# Patient Record
Sex: FEMALE | Race: WHITE | Employment: OTHER | URBAN - METROPOLITAN AREA
[De-identification: names, ages, dates, MRNs, and addresses within clinical notes are randomized per-mention and may not be internally consistent; named-entity substitution may affect disease eponyms.]

---

## 2017-01-16 ENCOUNTER — DOCUMENTATION ONLY (OUTPATIENT)
Dept: INTERNAL MEDICINE CLINIC | Age: 67
End: 2017-01-16

## 2017-01-17 ENCOUNTER — OFFICE VISIT (OUTPATIENT)
Dept: INTERNAL MEDICINE CLINIC | Age: 67
End: 2017-01-17

## 2017-01-17 VITALS
HEART RATE: 58 BPM | SYSTOLIC BLOOD PRESSURE: 130 MMHG | WEIGHT: 152.7 LBS | BODY MASS INDEX: 26.07 KG/M2 | OXYGEN SATURATION: 98 % | TEMPERATURE: 97.9 F | HEIGHT: 64 IN | DIASTOLIC BLOOD PRESSURE: 60 MMHG | RESPIRATION RATE: 14 BRPM

## 2017-01-17 DIAGNOSIS — Z23 ENCOUNTER FOR IMMUNIZATION: ICD-10-CM

## 2017-01-17 DIAGNOSIS — Z00.00 MEDICARE ANNUAL WELLNESS VISIT, INITIAL: Primary | ICD-10-CM

## 2017-01-17 NOTE — PROGRESS NOTES
Judson Guan is a 74 y.o. female and presents for annual Medicare Wellness Visit. Problem List: Reviewed with patient and discussed risk factors. 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     Hx breast lumpectomy       right - benign    Hx orthopaedic       surgery for tibial plateau fx - took bone from hip/has hardware knee to ankle    Hx gi       hemorrhoidectomy        SH: Reviewed with patient  Social History   Substance Use Topics    Smoking status: Never Smoker    Smokeless tobacco: Never Used    Alcohol use 4.2 oz/week     7 Glasses of wine per week       FH: Reviewed with patient  Family History   Problem Relation Age of Onset    Alcohol abuse Mother      cirrhosis    Alcohol abuse Father     Other Father      cerebral hemorrhage    Cancer Brother      prostate    Heart Disease Brother 46     MI(age 46), bypass(66)    Diabetes Maternal Aunt     Hypertension Maternal Aunt     Cancer Maternal Uncle      COLON    Heart Disease Maternal Grandmother      MI    Cancer Cousin      BREAST       Medications/Allergies: Reviewed with patient  Current Outpatient Prescriptions on File Prior to Visit   Medication Sig Dispense Refill    SYNTHROID 100 mcg tablet TAKE 1 TABLET DAILY BEFORE BREAKFAST 90 Tab 1    simvastatin (ZOCOR) 40 mg tablet Take 1 Tab by mouth nightly. 90 Tab 1    FLUoxetine (PROZAC) 20 mg capsule TAKE 1 CAPSULE DAILY 90 Cap 1    coenzyme q10 300 mg cap Take 300 mg by mouth daily.  ranitidine (ZANTAC) 300 mg tablet Take 1 Tab by mouth daily. 60 Tab 0    LACTOBACILLUS ACIDOPHILUS (PROBIOTIC PO) Take 1 Cap by mouth daily.  BOSWELLIA SURENDRA EXTRACT (BOSWELLIA SURENDRA XT, BULK,) Take  by mouth daily. Anti-inflammatory supplement. Takes one cap po daily.  MULTIVITAMIN PO Take  by mouth daily. Takes one po daily.  CETIRIZINE HCL (ZYRTEC PO) Take 10 mg by mouth as needed. No current facility-administered medications on file prior to visit. Allergies   Allergen Reactions    Adhesive Hives    E-Mycin [Erythromycin] Other (comments)     GI upset    Sudafed [Pseudoephedrine Hcl] Palpitations       Objective:  Visit Vitals    /60 (BP 1 Location: Left arm, BP Patient Position: Sitting)    Pulse (!) 58    Temp 97.9 °F (36.6 °C) (Oral)    Resp 14    Ht 5' 4\" (1.626 m)    Wt 152 lb 11.2 oz (69.3 kg)    SpO2 98%    BMI 26.21 kg/m2    Body mass index is 26.21 kg/(m^2). Assessment of cognitive impairment: Alert and oriented x 3    Depression Screen:   PHQ 2 / 9, over the last two weeks 1/17/2017   Little interest or pleasure in doing things Not at all   Feeling down, depressed or hopeless Not at all   Total Score PHQ 2 0       Fall Risk Assessment:    Fall Risk Assessment, last 12 mths 1/17/2017   Able to walk? Yes   Fall in past 12 months?  No       Functional Ability:   Does the patient exhibit a steady gait? yes   How long did it take the patient to get up and walk from a sitting position? 3 seconds   Is the patient self reliant?  (ie can do own laundry, meals, household chores)  yes     Does the patient handle his/her own medications? yes     Does the patient handle his/her own money? yes     Is the patients home safe (ie good lighting, handrails on stairs and bath, etc.)? yes     Did you notice or did patient express any hearing difficulties? no     Did you notice or did patient express any vision difficulties?   no     Were distance and reading eye charts used? no       Advance Care Planning:   Patient was offered the opportunity to discuss advance care planning:  yes     Does patient have an Advance Directive:  yes   If no, did you provide information on Caring Connections?  no       Plan:      Orders Placed This Encounter    pneumococcal 13 marija conj dip (PREVNAR-13) 0.5 mL syrg injection       Health Maintenance   Topic Date Due    GLAUCOMA SCREENING Q2Y  01/01/2018    MEDICARE YEARLY EXAM  01/18/2018    BREAST CANCER SCRN MAMMOGRAM  06/14/2018    COLONOSCOPY  11/09/2020    DTaP/Tdap/Td series (2 - Td) 10/16/2026    Hepatitis C Screening  Completed    OSTEOPOROSIS SCREENING (DEXA)  Completed    ZOSTER VACCINE AGE 60>  Completed    Pneumococcal 65+ Low/Medium Risk  Completed    INFLUENZA AGE 9 TO ADULT  Completed       *Patient verbalized understanding and agreement with the plan. A copy of the After Visit Summary with personalized health plan was given to the patient today. Patient up to date on immunizations except Prevnar. Prevnar ordered today. Patient has eye exam scheduled for April 2017 with Dr. Ramin Pack. Patient receives mammograms and DEXA through Dr. Fifi Mota office(GYN). Last mammogram was June 2016. Dexa was 2015. Patient had colonoscopy in 12/2016 and is to follow-up in 3 years. Patient complains of lack of sexual intercourse in her marriage.  She said \" is not interested\". She has tried to get him to attend counseling, but he refuses. Suggested patient to express her needs and concerns, and consider counseling alone. Patients does not exercise, but reports she has a well-balanced diet with portion control. Patient will bring advance medical directive to next visit.

## 2017-01-17 NOTE — MR AVS SNAPSHOT
Visit Information Date & Time Provider Department Dept. Phone Encounter #  
 1/17/2017  2:30 PM MANASA Verdugo [facility] [phone] [id number] Your Appointments 3/10/2017 10:40 AM  
ROUTINE CARE with MD Julieta Arenas [facility] ([facility]) Appt Note: 6m fu  
 [address] , [address]  
[address], [address] [address]  
  
    
 6/9/2017  1:20 PM  
ESTABLISHED PATIENT with Senthil Devlin MD  
[facility] ([facility]) Appt Note: one year follow up  
 [address]  
[address] [address] Upcoming Health Maintenance Date Due  
 GLAUCOMA SCREENING Q2Y 1/1/2018 MEDICARE YEARLY EXAM 1/18/2018 BREAST CANCER SCRN MAMMOGRAM 6/14/2018 COLONOSCOPY 11/9/2020 DTaP/Tdap/Td series (2 - Td) 10/16/2026 Allergies as of 1/17/2017  Review Complete On: 1/17/2017 By: Servando Carcamo LPN Severity Noted Reaction Type Reactions Adhesive Medium 03/09/2016    Hives E-mycin [Erythromycin]  12/07/2011   Side Effect Other (comments) GI upset Sudafed [Pseudoephedrine Hcl]  12/07/2011   Side Effect Palpitations Current Immunizations  Reviewed on 9/9/2016 Name Date Influenza High Dose Vaccine PF 10/31/2015 Influenza Vaccine 10/11/2016, 10/1/2014, 9/18/2013 11:12 AM  
 Pneumococcal Polysaccharide (PPSV-23) 3/18/2014 Pneumococcal Vaccine (Unspecified Type) 10/1/2014 Tdap 10/16/2016 Zoster 7/9/2011 Not reviewed this visit You Were Diagnosed With   
  
 Codes Comments Medicare annual wellness visit, initial    -  Primary ICD-10-CM: Z00.00 ICD-9-CM: V70.0 Encounter for immunization     ICD-10-CM: K63 ICD-9-CM: V03.89 Vitals BP Pulse Temp Resp Height(growth percentile) Weight(growth percentile) 130/60 (BP 1 Location: Left arm, BP Patient Position: Sitting) (!) 58 97.9 °F (36.6 °C) (Oral) 14 5' 4\" (1.626 m) 152 lb 11.2 oz (69.3 kg) SpO2 BMI OB Status Smoking Status 98% 26.21 kg/m2 Postmenopausal Never Smoker Vitals History BMI and BSA Data Body Mass Index Body Surface Area  
 26.21 kg/m 2 1.77 m 2 Preferred Pharmacy Pharmacy Name Phone [facility] [address] [place] [address], [address] [place] [address] [phone] Your Updated Medication List  
  
   
This list is accurate as of: 17  3:14 PM.  Always use your most recent med list.  
  
  
  
  
 BOSWELLIA SURENDRA XT (BULK) Take  by mouth daily. Anti-inflammatory supplement. Takes one cap po daily. coenzyme q10 300 mg Cap Take 300 mg by mouth daily. FLUoxetine 20 mg capsule Commonly known as:  PROzac TAKE 1 CAPSULE DAILY MULTIVITAMIN PO Take  by mouth daily. Takes one po daily. pneumococcal 13 marija conj dip 0.5 mL Syrg injection Commonly known as:  PREVNAR-13  
0.5 mL by IntraMUSCular route once for 1 dose. PROBIOTIC PO Take 1 Cap by mouth daily. raNITIdine 300 mg tablet Commonly known as:  ZANTAC Take 1 Tab by mouth daily. simvastatin 40 mg tablet Commonly known as:  ZOCOR Take 1 Tab by mouth nightly. SYNTHROID 100 mcg tablet Generic drug:  levothyroxine TAKE 1 TABLET DAILY BEFORE BREAKFAST  
  
 ZYRTEC PO Take 10 mg by mouth as needed. Prescriptions Printed Refills  
 pneumococcal 13 marija conj dip (PREVNAR-13) 0.5 mL syrg injection 0 Si.5 mL by IntraMUSCular route once for 1 dose. Class: Print Route: IntraMUSCular Patient Instructions Schedule of Personalized Health Plan (Provide Copy to Patient) The best way to stay healthy is to live a healthy lifestyle.  A healthy lifestyle includes regular exercise, eating a well-balanced diet, keeping a healthy weight and not smoking. Regular physical exams and screening tests are another important way to take care of yourself. Preventive exams provided by health care providers can find health problems early when treatment works best and can keep you from getting certain diseases or illnesses. Preventive services include exams, lab tests, screenings, shots, monitoring and information to help you take care of your own health. All people over 65 should have a pneumonia shot. Pneumonia shots are usually only needed once in a lifetime unless your doctor decides differently. (pneumovax done 2014; prevnar ordered today) All people over 65 should have a yearly flu shot.(done 10/2016) People over 65 are at medium to high risk for Hepatitis B. Three shots are needed for complete protection. In addition to your physical exam, some screening tests are recommended: 
 
Bone mass measurement (dexa scan) is recommended every two years(done 2015- will call GYN office to schedule) Diabetes Mellitus screening is recommended every year. Glaucoma is an eye disease caused by high pressure in the eye. An eye exam is recommended every year. Cardiovascular screening tests that check your cholesterol and other blood fat (lipid) levels are recommended every five years. Colorectal Cancer screening tests help to find pre-cancerous polyps (growths in the colon) so they can be removed before they turn into cancer. Tests ordered for screening depend on your personal and family history risk factors. (done 12/2016-repeat 3 years) Screening for Breast Cancer is recommended yearly with a mammogram.(due 6/2017) Screening for Cervical Cancer is recommended every two years (annually for certain risk factors, such as previous history of STD or abnormal PAP in past 7 years), with a Pelvic Exam with PAP Here is a list of your current Health Maintenance items with a due date: 
Health Maintenance Topic Date Due  
  GLAUCOMA SCREENING Q2Y  01/01/2018  MEDICARE YEARLY EXAM  01/18/2018  BREAST CANCER SCRN MAMMOGRAM  06/14/2018  COLONOSCOPY  11/09/2020  
 DTaP/Tdap/Td series (2 - Td) 10/16/2026  Hepatitis C Screening  Completed  OSTEOPOROSIS SCREENING (DEXA)  Completed  ZOSTER VACCINE AGE 60>  Completed  Pneumococcal 65+ Low/Medium Risk  Completed  INFLUENZA AGE 9 TO ADULT  Completed Introducing [facility] & HEALTH SERVICES! Dear Sujata Cameron: Thank you for requesting a GameFly account. Our records indicate that you already have an active GameFly account. You can access your account anytime at [email or web address] Did you know that you can access your hospital and ER discharge instructions at any time in GameFly? You can also review all of your test results from your hospital stay or ER visit. Additional Information If you have questions, please visit the Frequently Asked Questions section of the GameFly website at [email or web address] ([email or web address] Remember, GameFly is NOT to be used for urgent needs. For medical emergencies, dial 911. Now available from your iPhone and Android! Please provide this summary of care documentation to your next provider. Your primary care clinician is listed as Chastity Milton. If you have any questions after today's visit, please call [phone].

## 2017-01-17 NOTE — PATIENT INSTRUCTIONS
Schedule of Personalized Health Plan  (Provide Copy to Patient)  The best way to stay healthy is to live a healthy lifestyle. A healthy lifestyle includes regular exercise, eating a well-balanced diet, keeping a healthy weight and not smoking. Regular physical exams and screening tests are another important way to take care of yourself. Preventive exams provided by health care providers can find health problems early when treatment works best and can keep you from getting certain diseases or illnesses. Preventive services include exams, lab tests, screenings, shots, monitoring and information to help you take care of your own health. All people over 65 should have a pneumonia shot. Pneumonia shots are usually only needed once in a lifetime unless your doctor decides differently. (pneumovax done 2014; prevnar ordered today)    All people over 65 should have a yearly flu shot.(done 10/2016)    People over 65 are at medium to high risk for Hepatitis B. Three shots are needed for complete protection. In addition to your physical exam, some screening tests are recommended:    Bone mass measurement (dexa scan) is recommended every two years(done 2015- will call GYN office to schedule)  Diabetes Mellitus screening is recommended every year. Glaucoma is an eye disease caused by high pressure in the eye. An eye exam is recommended every year. Cardiovascular screening tests that check your cholesterol and other blood fat (lipid) levels are recommended every five years. Colorectal Cancer screening tests help to find pre-cancerous polyps (growths in the colon) so they can be removed before they turn into cancer. Tests ordered for screening depend on your personal and family history risk factors. (done 12/2016-repeat 3 years)    Screening for Breast Cancer is recommended yearly with a mammogram.(due 6/2017)    Screening for Cervical Cancer is recommended every two years (annually for certain risk factors, such as previous history of STD or abnormal PAP in past 7 years), with a Pelvic Exam with PAP    Here is a list of your current Health Maintenance items with a due date:  Health Maintenance   Topic Date Due    GLAUCOMA SCREENING Q2Y  01/01/2018    MEDICARE YEARLY EXAM  01/18/2018    BREAST CANCER SCRN MAMMOGRAM  06/14/2018    COLONOSCOPY  11/09/2020    DTaP/Tdap/Td series (2 - Td) 10/16/2026    Hepatitis C Screening  Completed    OSTEOPOROSIS SCREENING (DEXA)  Completed    ZOSTER VACCINE AGE 60>  Completed    Pneumococcal 65+ Low/Medium Risk  Completed    INFLUENZA AGE 9 TO ADULT  Completed

## 2017-01-17 NOTE — PROGRESS NOTES
Chief Complaint   Patient presents with   [facility] Wellness Visit     Reviewed record in preparation for visit and have obtained necessary documentation. Identified pt with two pt identifiers(name and ). There are no preventive care reminders to display for this patient. Chief Complaint   Patient presents with   [facility] Annual Wellness Visit        Wt Readings from Last 3 Encounters:   17 152 lb 11.2 oz (69.3 kg)   10/17/16 150 lb (68 kg)   16 150 lb (68 kg)     Temp Readings from Last 3 Encounters:   17 97.9 °F (36.6 °C) (Oral)   10/17/16 97.6 °F (36.4 °C) (Oral)   16 98 °F (36.7 °C) (Oral)     BP Readings from Last 3 Encounters:   17 130/60   10/17/16 122/62   16 120/70     Pulse Readings from Last 3 Encounters:   17 (!) 58   10/17/16 70   16 63           Learning Assessment:  :     Learning Assessment 2015 2014 2014 1/10/2013   PRIMARY LEARNER Patient Patient Patient Patient   HIGHEST LEVEL OF EDUCATION - PRIMARY LEARNER  GRADUATED HIGH SCHOOL OR GED GRADUATED HIGH SCHOOL OR GED GRADUATED HIGH SCHOOL OR GED -   BARRIERS PRIMARY LEARNER NONE NONE NONE -   CO-LEARNER CAREGIVER No No No -   PRIMARY LANGUAGE ENGLISH ENGLISH ENGLISH ENGLISH    NEED No - - -   LEARNER PREFERENCE PRIMARY LISTENING LISTENING DEMONSTRATION DEMONSTRATION     DEMONSTRATION DEMONSTRATION - -   LEARNING SPECIAL TOPICS no - - -   ANSWERED BY patient Gonzella Hodgkin patient patient   RELATIONSHIP SELF SELF SELF SELF       Depression Screening:  :     PHQ 2 / 9, over the last two weeks 3/18/2014   Little interest or pleasure in doing things Not at all   Feeling down, depressed or hopeless Not at all   Total Score PHQ 2 0       Fall Risk Assessment:  :     Fall Risk Assessment, last 12 mths 2016   Able to walk? Yes   Fall in past 12 months? No       Abuse Screening:  :     Abuse Screening Questionnaire 3/16/2015 3/18/2014   Do you ever feel afraid of your partner? N N   Are you in a relationship with someone who physically or mentally threatens you? N N   Is it safe for you to go home? Y Y       Coordination of Care Questionnaire:  :     1) Have you been to an emergency room, urgent care clinic since your last visit? no   Hospitalized since your last visit? no             2) Have you seen or consulted any other health care providers outside of [address] [place] [address] since your last visit? no  (Include any pap smears or colon screenings in this section.)    3) Do you have an Advance Directive on file? no    4) Are you interested in receiving information on Advance Directives? NO      Patient is accompanied by self I have received verbal consent from Rosa Ortega to discuss any/all medical information while they are present in the room. Reviewed record  In preparation for visit and have obtained necessary documentation.

## 2017-02-23 RX ORDER — FLUOXETINE HYDROCHLORIDE 20 MG/1
CAPSULE ORAL
Qty: 90 CAP | Refills: 1 | Status: SHIPPED | OUTPATIENT
Start: 2017-02-23 | End: 2017-03-08 | Stop reason: SDUPTHER

## 2017-03-08 NOTE — TELEPHONE ENCOUNTER
Pt states that her recent refill on Prozac was sent to her local pharmacy instead of the mail order pharmacy. Pt request new script be sent to mail order. Ms. Lucretia Bardales notes that she has enough medication to last until she receives medication from mail order. Will call to cancel script at local pharmacy once mail order script is approved. Thanks!     Requested Prescriptions     Pending Prescriptions Disp Refills    FLUoxetine (PROZAC) 20 mg capsule 90 Cap 1     Sig: TAKE 1 CAPSULE DAILY

## 2017-03-09 RX ORDER — FLUOXETINE HYDROCHLORIDE 20 MG/1
CAPSULE ORAL
Qty: 90 CAP | Refills: 1 | Status: SHIPPED | OUTPATIENT
Start: 2017-03-09 | End: 2017-09-06 | Stop reason: SDUPTHER

## 2017-03-10 ENCOUNTER — OFFICE VISIT (OUTPATIENT)
Dept: INTERNAL MEDICINE CLINIC | Age: 67
End: 2017-03-10

## 2017-03-10 VITALS
SYSTOLIC BLOOD PRESSURE: 132 MMHG | WEIGHT: 152 LBS | TEMPERATURE: 96.4 F | RESPIRATION RATE: 16 BRPM | OXYGEN SATURATION: 98 % | BODY MASS INDEX: 25.95 KG/M2 | HEIGHT: 64 IN | DIASTOLIC BLOOD PRESSURE: 70 MMHG | HEART RATE: 61 BPM

## 2017-03-10 DIAGNOSIS — F32.A DEPRESSION, UNSPECIFIED DEPRESSION TYPE: ICD-10-CM

## 2017-03-10 DIAGNOSIS — E78.2 MIXED HYPERLIPIDEMIA: ICD-10-CM

## 2017-03-10 DIAGNOSIS — I10 BENIGN HYPERTENSION: Primary | ICD-10-CM

## 2017-03-10 DIAGNOSIS — E03.9 HYPOTHYROIDISM, ADULT: ICD-10-CM

## 2017-03-10 DIAGNOSIS — Z79.899 ENCOUNTER FOR LONG-TERM (CURRENT) DRUG USE: ICD-10-CM

## 2017-03-10 NOTE — MR AVS SNAPSHOT
Visit Information Date & Time Provider Department Dept. Phone Encounter #  
 3/10/2017 10:40 AM Jordin Woods MD [facility] [phone] [id number] Follow-up Instructions Return in about 6 months (around 9/10/2017) for hypertension, hypothyroid. Your Appointments 6/9/2017  1:20 PM  
ESTABLISHED PATIENT with Beny Valdivia MD  
[facility] ([facility]) Appt Note: one year follow up  
 [address] [facility] [address]  
[address] [place] [address] Upcoming Health Maintenance Date Due  
 GLAUCOMA SCREENING Q2Y 1/1/2018 MEDICARE YEARLY EXAM 1/18/2018 BREAST CANCER SCRN MAMMOGRAM 6/14/2018 COLONOSCOPY 11/9/2020 DTaP/Tdap/Td series (2 - Td) 10/16/2026 Allergies as of 3/10/2017  Review Complete On: 3/10/2017 By: Jordin Woods MD  
  
 Severity Noted Reaction Type Reactions Adhesive Medium 03/09/2016    Hives Cephalexin  03/10/2017    Diarrhea  
 E-mycin [Erythromycin]  12/07/2011   Side Effect Other (comments) GI upset Sudafed [Pseudoephedrine Hcl]  12/07/2011   Side Effect Palpitations Current Immunizations  Reviewed on 3/10/2017 Name Date Influenza High Dose Vaccine PF 10/31/2015 Influenza Vaccine 10/11/2016, 10/1/2014, 9/18/2013 11:12 AM  
 Pneumococcal Conjugate (PCV-13) 2/14/2017 Pneumococcal Polysaccharide (PPSV-23) 3/18/2014 Pneumococcal Vaccine (Unspecified Type) 10/1/2014 Tdap 10/16/2016 Zoster 7/9/2011 Reviewed by Jordin Woods MD on 3/10/2017 at 10:56 AM  
You Were Diagnosed With   
  
 Codes Comments Benign hypertension    -  Primary ICD-10-CM: I10 
ICD-9-CM: 401.1 Mixed hyperlipidemia     ICD-10-CM: E78.2 ICD-9-CM: 272.2 Hypothyroidism, adult     ICD-10-CM: E03.9 ICD-9-CM: 244.9 Depression, unspecified depression type     ICD-10-CM: F32.9 ICD-9-CM: 495 Encounter for long-term (current) drug use     ICD-10-CM: Z79.899 ICD-9-CM: V58.69 Vitals BP Pulse Temp Resp Height(growth percentile) Weight(growth percentile) 132/70 (BP 1 Location: Left arm, BP Patient Position: Sitting) 61 96.4 °F (35.8 °C) (Oral) 16 5' 4\" (1.626 m) 152 lb (68.9 kg) SpO2 BMI OB Status Smoking Status 98% 26.09 kg/m2 Postmenopausal Never Smoker Vitals History BMI and BSA Data Body Mass Index Body Surface Area 26.09 kg/m 2 1.76 m 2 Preferred Pharmacy Pharmacy Name Phone [address], [facility] [phone] Your Updated Medication List  
  
   
This list is accurate as of: 3/10/17 11:06 AM.  Always use your most recent med list.  
  
  
  
  
 BOSWELLIA SURENDRA XT (BULK) Take  by mouth daily. Anti-inflammatory supplement. Takes one cap po daily. coenzyme q10 300 mg Cap Take 300 mg by mouth daily. FLUoxetine 20 mg capsule Commonly known as:  PROzac TAKE 1 CAPSULE DAILY MULTIVITAMIN PO Take  by mouth daily. Takes one po daily. PROBIOTIC PO Take 1 Cap by mouth daily. raNITIdine 300 mg tablet Commonly known as:  ZANTAC Take 1 Tab by mouth daily. simvastatin 40 mg tablet Commonly known as:  ZOCOR Take 1 Tab by mouth nightly. SYNTHROID 100 mcg tablet Generic drug:  levothyroxine TAKE 1 TABLET DAILY BEFORE BREAKFAST  
  
 ZYRTEC PO Take 10 mg by mouth as needed. We Performed the Following LIPID PANEL [35594 CPT(R)] METABOLIC PANEL, COMPREHENSIVE [65555 CPT(R)] T4, FREE Z2612341 CPT(R)] TSH 3RD GENERATION [08103 CPT(R)] Follow-up Instructions Return in about 6 months (around 9/10/2017) for hypertension, hypothyroid. Introducing [facility] & HEALTH SERVICES! Dear Silvana Roque: Thank you for requesting a Fast Societyt account.   Our records indicate that you already have an active ProRadis account. You can access your account anytime at [email or web address] Did you know that you can access your hospital and ER discharge instructions at any time in ProRadis? You can also review all of your test results from your hospital stay or ER visit. Additional Information If you have questions, please visit the Frequently Asked Questions section of the ProRadis website at [email or web address] Remember, ProRadis is NOT to be used for urgent needs. For medical emergencies, dial 911. Now available from your iPhone and Android! Please provide this summary of care documentation to your next provider. Your primary care clinician is listed as Chastity Milton. If you have any questions after today's visit, please call [phone].

## 2017-03-10 NOTE — PROGRESS NOTES
Chief Complaint   Patient presents with    Cholesterol Problem     6 month f/u    Thyroid Problem    Depression

## 2017-03-10 NOTE — PROGRESS NOTES
Subjective: Castro Norton is a 74 y.o. female who presents today for follow up of her hypertension, hyperlipidemia, hypothyroid and depression. She is without any new concerns today. She denies any chest pain, shortness of breath, syncope, headaches, nausea/vomiting, or any bowel changes. 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 90 Tab 1    coenzyme q10 300 mg cap Take 300 mg by mouth daily.  ranitidine (ZANTAC) 300 mg tablet Take 1 Tab by mouth daily. 60 Tab 0    LACTOBACILLUS ACIDOPHILUS (PROBIOTIC PO) Take 1 Cap by mouth daily.  CETIRIZINE HCL (ZYRTEC PO) Take 10 mg by mouth as needed.  BOSWELLIA SURENDRA EXTRACT (BOSWELLIA SURENDRA XT, BULK,) Take  by mouth daily. Anti-inflammatory supplement. Takes one cap po daily.  MULTIVITAMIN PO Take  by mouth daily. Takes one po daily. Review of Systems    Pertinent items are noted in HPI.      Objective:     Visit Vitals    /70 (BP 1 Location: Left arm, BP Patient Position: Sitting)    Pulse 61    Temp 96.4 °F (35.8 °C) (Oral)    Resp 16    Ht 5' 4\" (1.626 m)    Wt 152 lb (68.9 kg)  SpO2 98%    BMI 26.09 kg/m2     General appearance: alert, cooperative, no distress, appears stated age  Head: Normocephalic, without obvious abnormality, atraumatic  Neck: supple, symmetrical, trachea midline, no adenopathy, thyroid: not enlarged, symmetric, no tenderness/mass/nodules, no carotid bruit and no JVD  Lungs: clear to auscultation bilaterally  Heart: regular rate and rhythm, S1, S2 normal, no murmur, click, rub or gallop  Extremities: extremities normal, atraumatic, no cyanosis or edema  Pulses: 2+ and symmetric    Assessment/Plan:     1. Benign hypertension  -I evaluated and recommended to continue current doses of medications.     - METABOLIC PANEL, COMPREHENSIVE    2. Mixed hyperlipidemia  -compliant with use of simvastatin    - METABOLIC PANEL, COMPREHENSIVE  - LIPID PANEL    3. Hypothyroidism, adult  -clinically euthyroid    - TSH 3RD GENERATION  - T4, FREE    4. Depression, unspecified depression type  -I evaluated and recommended to continue current doses of medications.     - METABOLIC PANEL, COMPREHENSIVE    5. Encounter for long-term (current) drug use    - METABOLIC PANEL, COMPREHENSIVE  - LIPID PANEL     Orders Placed This Encounter    METABOLIC PANEL, COMPREHENSIVE    TSH 3RD GENERATION    T4, FREE    LIPID PANEL      Follow-up Disposition:     Follow up in 6 months     Return if symptoms worsen or fail to improve. Advised patient to call back or return to office if symptoms worsen/change/persist.     Discussed expected course/resolution/complications of diagnosis in detail with patient. Medication risks/benefits/costs/interactions/alternatives discussed with patient. Patient was given an after visit summary which includes diagnoses, current medications, & vitals. Patient expressed understanding with the diagnosis and plan.

## 2017-03-11 LAB
ALBUMIN SERPL-MCNC: 4.6 G/DL (ref 3.6–4.8)
ALBUMIN/GLOB SERPL: 2.2 {RATIO} (ref 1.1–2.5)
ALP SERPL-CCNC: 94 IU/L (ref 39–117)
ALT SERPL-CCNC: 17 IU/L (ref 0–32)
AST SERPL-CCNC: 20 IU/L (ref 0–40)
BILIRUB SERPL-MCNC: 0.5 MG/DL (ref 0–1.2)
BUN SERPL-MCNC: 18 MG/DL (ref 8–27)
BUN/CREAT SERPL: 31 (ref 11–26)
CALCIUM SERPL-MCNC: 9.3 MG/DL (ref 8.7–10.3)
CHLORIDE SERPL-SCNC: 101 MMOL/L (ref 96–106)
CHOLEST SERPL-MCNC: 164 MG/DL (ref 100–199)
CO2 SERPL-SCNC: 25 MMOL/L (ref 18–29)
CREAT SERPL-MCNC: 0.59 MG/DL (ref 0.57–1)
GLOBULIN SER CALC-MCNC: 2.1 G/DL (ref 1.5–4.5)
GLUCOSE SERPL-MCNC: 88 MG/DL (ref 65–99)
HDLC SERPL-MCNC: 73 MG/DL
INTERPRETATION, 910389: NORMAL
LDLC SERPL CALC-MCNC: 70 MG/DL (ref 0–99)
POTASSIUM SERPL-SCNC: 4.3 MMOL/L (ref 3.5–5.2)
PROT SERPL-MCNC: 6.7 G/DL (ref 6–8.5)
SODIUM SERPL-SCNC: 141 MMOL/L (ref 134–144)
T4 FREE SERPL-MCNC: 1.27 NG/DL (ref 0.82–1.77)
TRIGL SERPL-MCNC: 104 MG/DL (ref 0–149)
TSH SERPL DL<=0.005 MIU/L-ACNC: 1.84 UIU/ML (ref 0.45–4.5)
VLDLC SERPL CALC-MCNC: 21 MG/DL (ref 5–40)

## 2017-06-09 ENCOUNTER — OFFICE VISIT (OUTPATIENT)
Dept: CARDIOLOGY CLINIC | Age: 67
End: 2017-06-09

## 2017-06-09 VITALS
WEIGHT: 153 LBS | RESPIRATION RATE: 16 BRPM | SYSTOLIC BLOOD PRESSURE: 144 MMHG | DIASTOLIC BLOOD PRESSURE: 82 MMHG | BODY MASS INDEX: 26.12 KG/M2 | HEART RATE: 53 BPM | HEIGHT: 64 IN | OXYGEN SATURATION: 98 %

## 2017-06-09 DIAGNOSIS — E78.2 MIXED HYPERLIPIDEMIA: Primary | ICD-10-CM

## 2017-06-09 DIAGNOSIS — I10 ESSENTIAL HYPERTENSION: ICD-10-CM

## 2017-06-09 DIAGNOSIS — R93.1 AGATSTON CAC SCORE 100-199: ICD-10-CM

## 2017-06-09 NOTE — PROGRESS NOTES
HISTORY OF PRESENT ILLNESS  Sujata Noel is a 74 y.o. female     SUMMARY:   Problem List  Date Reviewed: 6/9/2017          Codes Class Noted    H/O bone density study (Chronic) ICD-10-CM: Z92.89  ICD-9-CM: V15.89  9/9/2016    Overview Signed 9/9/2016 11:19 AM by Cherelle Maloney MD     [facility] CAC score 100-199 ICD-10-CM: R93.1  ICD-9-CM: 793.2  6/10/2016    Overview Signed 6/10/2016  8:24 AM by Dian Ortiz MD     5/16 score 188, 80th percentile             Nuclear cataract ICD-10-CM: H25.10  ICD-9-CM: 366.16  2/1/2016        Benign neoplasm of choroid ICD-10-CM: D31.30  ICD-9-CM: 224.6  2/1/2016        Hyperlipidemia ICD-10-CM: E78.5  ICD-9-CM: 272.4  1/10/2013        Colon cancer screening (Chronic) ICD-10-CM: Z12.11  ICD-9-CM: V76.51  7/10/2012    Overview Addendum 1/17/2017  4:26 PM by MANASA [address] Dr., 11/18/2015., poor prep, follow up in 1 year. Polyps removed. Repeat done 12/2016, polyps removed; follow-up in 3 years.              History of screening mammography (Chronic) ICD-10-CM: Q48.50  ICD-9-CM: V15.89  7/10/2012    Overview Addendum 6/23/2016  7:06 PM by MD Dr. Antonio Combs, 6/14/16             Pap smear for cervical cancer screening (Chronic) ICD-10-CM: Z12.4  ICD-9-CM: V76.2  7/10/2012    Overview Addendum 3/10/2017 11:02 AM by Cherelle Maloney MD     6/16, [facility]             Hypothyroid ICD-10-CM: E03.9  ICD-9-CM: 244.9  12/7/2011        Depression ICD-10-CM: F32.9  ICD-9-CM: 283  12/7/2011        HTN (hypertension) ICD-10-CM: I10  ICD-9-CM: 401.9  12/7/2011        Fibrocystic breast ICD-10-CM: N60.19  ICD-9-CM: 610.1  12/7/2011        S/p tibial fracture ICD-10-CM: Z87.81  ICD-9-CM: V54.16  12/7/2011    Overview Signed 12/7/2011  2:17 PM by Cherelle Maloney MD     Right with venkatesh placement, 1995             S/P hemorrhoidectomy ICD-10-CM: A26.867, Z87.19  ICD-9-CM: V45.89  12/7/2011    Overview Signed 12/7/2011  2:18 PM by Cherelle Maloney MD Dr. Adelita Colunga             Environmental allergies ICD-10-CM: Z91.09  ICD-9-CM: V15.09  12/7/2011        S/P laparoscopic cholecystectomy ICD-10-CM: Z90.49  ICD-9-CM: V45.89  12/7/2011        Hiatal hernia ICD-10-CM: K44.9  ICD-9-CM: 553.3  12/7/2011    Overview Signed 12/7/2011  2:18 PM by Aston Thomson MD     egd 11/9/10, Dr. Rosio Anthony Prescriptions on File Prior to Visit   Medication Sig    SYNTHROID 100 mcg tablet TAKE 1 TABLET DAILY BEFORE BREAKFAST    raNITIdine (ZANTAC) 300 mg tablet Take 1 Tab by mouth daily.  simvastatin (ZOCOR) 40 mg tablet Take 1 Tab by mouth nightly.  FLUoxetine (PROZAC) 20 mg capsule TAKE 1 CAPSULE DAILY    coenzyme q10 300 mg cap Take 300 mg by mouth daily.  LACTOBACILLUS ACIDOPHILUS (PROBIOTIC PO) Take 1 Cap by mouth daily.  CETIRIZINE HCL (ZYRTEC PO) Take 10 mg by mouth as needed.  BOSWELLIA SURENDRA EXTRACT (BOSWELLIA SURENDRA XT, BULK,) Take  by mouth daily. Anti-inflammatory supplement. Takes one cap po daily.  MULTIVITAMIN PO Take  by mouth daily. Takes one po daily. No current facility-administered medications on file prior to visit. CARDIOLOGY STUDIES TO DATE:  12/11 normal stress echo at [facility] (notes reviewed and summarized under media tab)    5/16 score 188, 80th percentile         Chief Complaint   Patient presents with    Abnormal Cardiac Test     HPI :  Ms. Thea uDbose is doing well. She is active but not exercising, weight is stable, and she is having no problems with her medications. Lipid profile a couple of months ago looked great with her HDL actually being higher than her LDL.      CARDIAC ROS:   negative for chest pain, dyspnea, palpitations, syncope, orthopnea, paroxysmal nocturnal dyspnea, exertional chest pressure/discomfort, claudication, lower extremity edema    Family History   Problem Relation Age of Onset    Alcohol abuse Mother      cirrhosis    Alcohol abuse Father    Snehal Mosquera Other Father cerebral hemorrhage    Cancer Brother      prostate    Heart Disease Brother 46     MI(age 46), bypass(66)    Diabetes Maternal Aunt     Hypertension Maternal Aunt     Cancer Maternal Uncle      COLON    Heart Disease Maternal Grandmother      MI    Cancer Cousin      BREAST       Past Medical History:   Diagnosis Date    Arthritis     lower back    Cancer ([address]     BCCAs removed - 6 on legs    Depression     Environmental allergies 12/7/2011    Fibrocystic breast 12/7/2011    GERD (gastroesophageal reflux disease)     Hiatal hernia 12/7/2011    HIGH BLOOD PRESSURE     not on meds    HTN (hypertension) 12/7/2011    Hypothyroid 12/7/2011    Ill-defined condition     prediabetic    Nausea & vomiting     S/P hemorrhoidectomy 12/7/2011    S/P laparoscopic cholecystectomy 12/7/2011    S/p tibial fracture 12/7/2011    Sleep apnea     CPAP not used/was mild and pt states not a problem now with wt loss    Thyroid disorder        GENERAL ROS:  A comprehensive review of systems was negative except for that written in the HPI.     Visit Vitals    /82 (BP 1 Location: Left arm, BP Patient Position: Sitting)    Pulse (!) 53    Resp 16    Ht 5' 4\" (1.626 m)    Wt 153 lb (69.4 kg)    SpO2 98%    BMI 26.26 kg/m2       Wt Readings from Last 3 Encounters:   06/09/17 153 lb (69.4 kg)   03/10/17 152 lb (68.9 kg)   01/17/17 152 lb 11.2 oz (69.3 kg)            BP Readings from Last 3 Encounters:   06/09/17 144/82   03/10/17 132/70   01/17/17 130/60       PHYSICAL EXAM  General appearance: alert, cooperative, no distress, appears stated age  Neck: supple, symmetrical, trachea midline, no adenopathy, no carotid bruit and no JVD  Lungs: clear to auscultation bilaterally  Heart: regular rate and rhythm, S1, S2 normal, no murmur, click, rub or gallop  Extremities: extremities normal, atraumatic, no cyanosis or edema    Lab Results   Component Value Date/Time    Cholesterol, total 164 03/10/2017 11:08 AM Cholesterol, total 167 09/09/2016 11:29 AM    Cholesterol, total 191 09/10/2015 01:14 PM    Cholesterol, total 216 03/16/2015 11:13 AM    Cholesterol, total 240 09/16/2014 09:26 AM    HDL Cholesterol 73 03/10/2017 11:08 AM    HDL Cholesterol 71 09/09/2016 11:29 AM    HDL Cholesterol 65 09/10/2015 01:14 PM    HDL Cholesterol 73 03/16/2015 11:13 AM    HDL Cholesterol 69 09/16/2014 09:26 AM    LDL, calculated 70 03/10/2017 11:08 AM    LDL, calculated 68 09/09/2016 11:29 AM    LDL, calculated 95 09/10/2015 01:14 PM    LDL, calculated 115 03/16/2015 11:13 AM    LDL, calculated 118 09/16/2014 09:26 AM    Triglyceride 104 03/10/2017 11:08 AM    Triglyceride 139 09/09/2016 11:29 AM    Triglyceride 154 09/10/2015 01:14 PM    Triglyceride 141 03/16/2015 11:13 AM    Triglyceride 263 09/16/2014 09:26 AM    CHOL/HDL Ratio 2.4 07/15/2010 11:10 AM     ASSESSMENT  Ms. Mihir Jiménez is stable at this point and well-compensated on a good medical regimen. She has not had a stress test since 2011, and given her risk factors we are going to set her up for a stress echo in the near future. current treatment plan is effective, no change in therapy  lab results and schedule of future lab studies reviewed with patient  reviewed diet, exercise and weight control    Encounter Diagnoses   Name Primary?  Mixed hyperlipidemia Yes    Essential hypertension     Agatston CAC score 100-199      No orders of the defined types were placed in this encounter. Follow-up Disposition:  Return in about 1 year (around 6/9/2018).     Michael Hernandez MD  6/9/2017

## 2017-06-09 NOTE — MR AVS SNAPSHOT
Visit Information Date & Time Provider Department Dept. Phone Encounter #  
 6/9/2017  1:20 PM Astrid Pham MD [facility] [phone] [id number] Follow-up Instructions Return in about 1 year (around 6/9/2018). Your Appointments 9/11/2017 10:40 AM  
ROUTINE CARE with David Cardoso MD  
[address] [facility] ([facility]) Appt Note: 6m fu for hypertension, hypothyroid [address], [address] [place]  
[address], [facility] [address] [phone] Upcoming Health Maintenance Date Due INFLUENZA AGE 9 TO ADULT 8/1/2017 GLAUCOMA SCREENING Q2Y 1/1/2018 MEDICARE YEARLY EXAM 1/18/2018 BREAST CANCER SCRN MAMMOGRAM 6/14/2018 COLONOSCOPY 11/9/2020 DTaP/Tdap/Td series (2 - Td) 10/16/2026 Allergies as of 6/9/2017  Review Complete On: 6/9/2017 By: Astrid Pham MD  
  
 Severity Noted Reaction Type Reactions Adhesive Medium 03/09/2016    Hives Cephalexin  03/10/2017    Diarrhea  
 E-mycin [Erythromycin]  12/07/2011   Side Effect Other (comments) GI upset Sudafed [Pseudoephedrine Hcl]  12/07/2011   Side Effect Palpitations Current Immunizations  Reviewed on 3/10/2017 Name Date Influenza High Dose Vaccine PF 10/31/2015 Influenza Vaccine 10/11/2016, 10/1/2014, 9/18/2013 11:12 AM  
 Pneumococcal Conjugate (PCV-13) 2/14/2017 Pneumococcal Polysaccharide (PPSV-23) 3/18/2014 Pneumococcal Vaccine (Unspecified Type) 10/1/2014 Tdap 10/16/2016 Zoster 7/9/2011 Not reviewed this visit You Were Diagnosed With   
  
 Codes Comments Mixed hyperlipidemia    -  Primary ICD-10-CM: O57.0 ICD-9-CM: 272.2 Essential hypertension     ICD-10-CM: I10 
ICD-9-CM: 401.9 Agatston CAC score 100-199     ICD-10-CM: R93.1 ICD-9-CM: 793.2 Vitals BP Pulse Resp Height(growth percentile) Weight(growth percentile) SpO2 144/82 (BP 1 Location: Left arm, BP Patient Position: Sitting) (!) 53 16 5' 4\" (1.626 m) 153 lb (69.4 kg) 98% BMI OB Status Smoking Status 26.26 kg/m2 Postmenopausal Never Smoker Vitals History BMI and BSA Data Body Mass Index Body Surface Area  
 26.26 kg/m 2 1.77 m 2 Preferred Pharmacy Pharmacy Name Phone [facility] [place] [facility]", [address] [place] [address] [phone] Your Updated Medication List  
  
   
This list is accurate as of: 6/9/17  2:02 PM.  Always use your most recent med list.  
  
  
  
  
 BOSWELLIA SURENDRA XT (BULK) Take  by mouth daily. Anti-inflammatory supplement. Takes one cap po daily. coenzyme q10 300 mg Cap Take 300 mg by mouth daily. FLUoxetine 20 mg capsule Commonly known as:  PROzac TAKE 1 CAPSULE DAILY MULTIVITAMIN PO Take  by mouth daily. Takes one po daily. PROBIOTIC PO Take 1 Cap by mouth daily. raNITIdine 300 mg tablet Commonly known as:  ZANTAC Take 1 Tab by mouth daily. simvastatin 40 mg tablet Commonly known as:  ZOCOR Take 1 Tab by mouth nightly. SYNTHROID 100 mcg tablet Generic drug:  levothyroxine TAKE 1 TABLET DAILY BEFORE BREAKFAST  
  
 ZYRTEC PO Take 10 mg by mouth as needed. Follow-up Instructions Return in about 1 year (around 6/9/2018). [facility] & HEALTH SERVICES! Dear Julio Chatman: Thank you for requesting a [employer] account. Our records indicate that you already have an active [employer] account. You can access your account anytime at [email or web address] Did you know that you can access your hospital and ER discharge instructions at any time in [employer]? You can also review all of your test results from your hospital stay or ER visit. Additional Information If you have questions, please visit the Frequently Asked Questions section of the [employer] website at [email or web address] Remember, [employer] is NOT to be used for urgent needs. For medical emergencies, dial 911. Now available from your iPhone and Android! Please provide this summary of care documentation to your next provider. Your primary care clinician is listed as Chastity Milton. If you have any questions after today's visit, please call [phone].

## 2017-06-27 ENCOUNTER — CLINICAL SUPPORT (OUTPATIENT)
Dept: CARDIOLOGY CLINIC | Age: 67
End: 2017-06-27

## 2017-06-27 DIAGNOSIS — R93.1 AGATSTON CAC SCORE 100-199: Primary | ICD-10-CM

## 2017-09-06 RX ORDER — FLUOXETINE HYDROCHLORIDE 20 MG/1
CAPSULE ORAL
Qty: 90 CAP | Refills: 1 | Status: SHIPPED | OUTPATIENT
Start: 2017-09-06 | End: 2018-03-05 | Stop reason: SDUPTHER

## 2017-09-11 ENCOUNTER — OFFICE VISIT (OUTPATIENT)
Dept: INTERNAL MEDICINE CLINIC | Age: 67
End: 2017-09-11

## 2017-09-11 ENCOUNTER — HOSPITAL ENCOUNTER (OUTPATIENT)
Dept: LAB | Age: 67
Discharge: HOME | End: 2017-09-11
Payer: MEDICARE

## 2017-09-11 VITALS
BODY MASS INDEX: 26.12 KG/M2 | SYSTOLIC BLOOD PRESSURE: 116 MMHG | WEIGHT: 153 LBS | TEMPERATURE: 96.9 F | OXYGEN SATURATION: 98 % | HEIGHT: 64 IN | HEART RATE: 48 BPM | RESPIRATION RATE: 14 BRPM | DIASTOLIC BLOOD PRESSURE: 80 MMHG

## 2017-09-11 DIAGNOSIS — E03.9 HYPOTHYROIDISM, ADULT: ICD-10-CM

## 2017-09-11 DIAGNOSIS — E78.2 MIXED HYPERLIPIDEMIA: ICD-10-CM

## 2017-09-11 DIAGNOSIS — Z79.899 ENCOUNTER FOR LONG-TERM (CURRENT) USE OF MEDICATIONS: ICD-10-CM

## 2017-09-11 DIAGNOSIS — I10 BENIGN HYPERTENSION: Primary | ICD-10-CM

## 2017-09-11 DIAGNOSIS — J30.2 SEASONAL ALLERGIC RHINITIS, UNSPECIFIED ALLERGIC RHINITIS TRIGGER: ICD-10-CM

## 2017-09-11 PROCEDURE — 84443 ASSAY THYROID STIM HORMONE: CPT

## 2017-09-11 PROCEDURE — 36415 COLL VENOUS BLD VENIPUNCTURE: CPT

## 2017-09-11 PROCEDURE — 84439 ASSAY OF FREE THYROXINE: CPT

## 2017-09-11 PROCEDURE — 80053 COMPREHEN METABOLIC PANEL: CPT

## 2017-09-11 PROCEDURE — 80061 LIPID PANEL: CPT

## 2017-09-11 NOTE — PROGRESS NOTES
Chief Complaint   Patient presents with    Hypertension     6 month follow up    Hypothyroidism     6 month follow up     1. Have you been to the ER, urgent care clinic since your last visit? Hospitalized since your last visit? No    2. Have you seen or consulted any other health care providers outside of the [facility] since your last visit? Include any pap smears or colon screening.  No

## 2017-09-11 NOTE — MR AVS SNAPSHOT
Visit Information Date & Time Provider Department Dept. Phone Encounter #  
 9/11/2017 10:40 AM Indu Martinez MD [place] [id number] [facility] [phone] [id number] Follow-up Instructions Return in about 6 months (around 3/11/2018) for hypertension, hyperlipidemia, hypothyroid. Your Appointments 6/8/2018  1:20 PM  
ESTABLISHED PATIENT with Michelle Branch MD  
[facility] ([facility]) Appt Note: 1 yr f/u  
 [address] [place] [address]  
[address] [facility]  
  
    
 6/27/2018  1:00 PM  
ESTABLISHED PATIENT with Michelle Branch MD  
[facility] ([facility] Appt Note: Annual  
 [address] [place]  
[phone] Upcoming Health Maintenance Date Due INFLUENZA AGE 9 TO ADULT 11/3/2017* GLAUCOMA SCREENING Q2Y 1/1/2018 MEDICARE YEARLY EXAM 1/18/2018 BREAST CANCER SCRN MAMMOGRAM 6/14/2018 COLONOSCOPY 11/9/2020 DTaP/Tdap/Td series (2 - Td) 10/16/2026 *Topic was postponed. The date shown is not the original due date. Allergies as of 9/11/2017  Review Complete On: 9/11/2017 By: Indu Martinez MD  
  
 Severity Noted Reaction Type Reactions Adhesive Medium 03/09/2016    Hives Cephalexin  03/10/2017    Diarrhea  
 E-mycin [Erythromycin]  12/07/2011   Side Effect Other (comments) GI upset Sudafed [Pseudoephedrine Hcl]  12/07/2011   Side Effect Palpitations Current Immunizations  Reviewed on 3/10/2017 Name Date Influenza High Dose Vaccine PF 10/31/2015 Influenza Vaccine 10/11/2016, 10/1/2014, 9/18/2013 11:12 AM  
 Pneumococcal Conjugate (PCV-13) 2/14/2017 Pneumococcal Polysaccharide (PPSV-23) 3/18/2014 Pneumococcal Vaccine (Unspecified Type) 10/1/2014 Tdap 10/16/2016 Zoster 7/9/2011 Not reviewed this visit You Were Diagnosed With   
  
 Codes Comments Benign hypertension    -  Primary ICD-10-CM: I10 
ICD-9-CM: 401.1 Hypothyroidism, adult     ICD-10-CM: E03.9 ICD-9-CM: 244.9 Mixed hyperlipidemia     ICD-10-CM: E78.2 ICD-9-CM: 272.2 Encounter for long-term (current) use of medications     ICD-10-CM: Z79.899 ICD-9-CM: V58.69 Seasonal allergic rhinitis, unspecified allergic rhinitis trigger     ICD-10-CM: J30.2 ICD-9-CM: 477.9 Vitals BP Pulse Temp Resp Height(growth percentile) Weight(growth percentile) 116/80 (BP 1 Location: Left arm, BP Patient Position: Sitting) (!) 48 96.9 °F (36.1 °C) (Oral) 14 5' 4\" (1.626 m) 153 lb (69.4 kg) SpO2 BMI OB Status Smoking Status 98% 26.26 kg/m2 Postmenopausal Never Smoker Vitals History BMI and BSA Data Body Mass Index Body Surface Area  
 26.26 kg/m 2 1.77 m 2 Preferred Pharmacy Pharmacy Name Phone [address], [facility] [phone] Your Updated Medication List  
  
   
This list is accurate as of: 9/11/17 11:15 AM.  Always use your most recent med list.  
  
  
  
  
 BOSWELLIA SURENDRA XT (BULK) Take  by mouth daily. Anti-inflammatory supplement. Takes one cap po daily. coenzyme q10 300 mg Cap Take 300 mg by mouth daily. FLUoxetine 20 mg capsule Commonly known as:  PROzac TAKE 1 CAPSULE DAILY MULTIVITAMIN PO Take  by mouth daily. Takes one po daily. PROBIOTIC PO Take 1 Cap by mouth daily. raNITIdine 300 mg tablet Commonly known as:  ZANTAC Take 1 Tab by mouth daily. simvastatin 40 mg tablet Commonly known as:  ZOCOR Take 1 Tab by mouth nightly. SYNTHROID 100 mcg tablet Generic drug:  levothyroxine TAKE 1 TABLET DAILY BEFORE BREAKFAST  
  
 ZYRTEC PO Take 10 mg by mouth as needed. We Performed the Following LIPID PANEL [07479 CPT(R)] METABOLIC PANEL, COMPREHENSIVE [69119 CPT(R)] T4, FREE [id number] CPT(R)] TSH 3RD GENERATION [99012 CPT(R)] Follow-up Instructions Return in about 6 months (around 3/11/2018) for hypertension, hyperlipidemia, hypothyroid. Introducing [facility] & HEALTH SERVICES! Dear Emerson San: Thank you for requesting a [employer] account. Our records indicate that you already have an active [employer] account. You can access your account anytime at [email or web address] Did you know that you can access your hospital and ER discharge instructions at any time in [employer]? You can also review all of your test results from your hospital stay or ER visit. Additional Information If you have questions, please visit the Frequently Asked Questions section of the [employer] website at [email or web address] Remember, [employer] is NOT to be used for urgent needs. For medical emergencies, dial 911. Now available from your iPhone and Android! Please provide this summary of care documentation to your next provider. Your primary care clinician is listed as Chastity Milton. If you have any questions after today's visit, please call [phone].

## 2017-09-11 NOTE — PROGRESS NOTES
Subjective: Mirlande Carvajal is a 74 y.o. female who presents today for follow up of her hypothyroid, hypertension and hyperlipidemia. Fullness in her left ear. Improved with use of afrin periodically. She is taking her zyrtec daily. She had follow up with her cardiologist, Dr. Cecilio Mercado, for hx of an abnormal stress test and elevated calcium score. She had a follow up stress done in 6/2017 which was normal.  She will follow with Dr. Cecilio Mercado yearly. 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 (Patient taking differently: Take 150 mg by mouth daily.) 90 Tab 1    simvastatin (ZOCOR) 40 mg tablet Take 1 Tab by mouth nightly. 90 Tab 1    coenzyme q10 300 mg cap Take 300 mg by mouth daily.  LACTOBACILLUS ACIDOPHILUS (PROBIOTIC PO) Take 1 Cap by mouth daily.  CETIRIZINE HCL (ZYRTEC PO) Take 10 mg by mouth as needed.  BOSWELLIA SURENDRA EXTRACT (BOSWELLIA SURENDRA XT, BULK,) Take  by mouth daily. Anti-inflammatory supplement. Takes one cap po daily.  MULTIVITAMIN PO Take  by mouth daily. Takes one po daily.           Review of Systems    Pertinent items are noted in HPI. Objective:     Visit Vitals    /80 (BP 1 Location: Left arm, BP Patient Position: Sitting)    Pulse (!) 48    Temp 96.9 °F (36.1 °C) (Oral)    Resp 14    Ht 5' 4\" (1.626 m)    Wt 153 lb (69.4 kg)    SpO2 98%    BMI 26.26 kg/m2     General appearance: alert, cooperative, no distress, appears stated age  Head: Normocephalic, without obvious abnormality, atraumatic  Ears: normal TM's and external ear canals AU  Neck: supple, symmetrical, trachea midline, no adenopathy, no carotid bruit and no JVD  Lungs: clear to auscultation bilaterally  Heart: regular rate and rhythm, S1, S2 normal, no murmur, click, rub or gallop  Extremities: extremities normal, atraumatic, no cyanosis or edema  Pulses: 2+ and symmetric    Assessment/Plan:     1. Hypothyroidism, adult  -clinically euthyroid  -compliant with use of her levothyroxine.     - METABOLIC PANEL, COMPREHENSIVE  - TSH 3RD GENERATION  - T4, FREE    2. Mixed hyperlipidemia  -check fasting lipid panel today    - METABOLIC PANEL, COMPREHENSIVE  - LIPID PANEL    3. Benign hypertension  I evaluated and recommended to continue current doses of medications.   - METABOLIC PANEL, COMPREHENSIVE    4. Encounter for long-term (current) use of medications      5. Seasonal allergic rhinitis, unspecified allergic rhinitis trigger  -patient taking her zyrtec 10mg daily. Recommended she add a steroid nasal spray daily for the next couple of months to cover her symptoms. Orders Placed This Encounter    METABOLIC PANEL, COMPREHENSIVE    LIPID PANEL    TSH 3RD GENERATION    T4, FREE         Follow-up Disposition:     Follow up in 6 months     Return if symptoms worsen or fail to improve. Advised patient to call back or return to office if symptoms worsen/change/persist.     Discussed expected course/resolution/complications of diagnosis in detail with patient. Medication risks/benefits/costs/interactions/alternatives discussed with patient.    Patient was given an after visit summary which includes diagnoses, current medications, & vitals. Patient expressed understanding with the diagnosis and plan.

## 2017-09-12 LAB
ALBUMIN SERPL-MCNC: 4.7 G/DL (ref 3.6–4.8)
ALBUMIN/GLOB SERPL: 2.2 {RATIO} (ref 1.2–2.2)
ALP SERPL-CCNC: 97 IU/L (ref 39–117)
ALT SERPL-CCNC: 20 IU/L (ref 0–32)
AST SERPL-CCNC: 21 IU/L (ref 0–40)
BILIRUB SERPL-MCNC: 0.5 MG/DL (ref 0–1.2)
BUN SERPL-MCNC: 15 MG/DL (ref 8–27)
BUN/CREAT SERPL: 21 (ref 12–28)
CALCIUM SERPL-MCNC: 9.5 MG/DL (ref 8.7–10.3)
CHLORIDE SERPL-SCNC: 102 MMOL/L (ref 96–106)
CHOLEST SERPL-MCNC: 184 MG/DL (ref 100–199)
CO2 SERPL-SCNC: 25 MMOL/L (ref 18–29)
CREAT SERPL-MCNC: 0.72 MG/DL (ref 0.57–1)
GLOBULIN SER CALC-MCNC: 2.1 G/DL (ref 1.5–4.5)
GLUCOSE SERPL-MCNC: 94 MG/DL (ref 65–99)
HDLC SERPL-MCNC: 72 MG/DL
INTERPRETATION, 910389: NORMAL
LDLC SERPL CALC-MCNC: 87 MG/DL (ref 0–99)
POTASSIUM SERPL-SCNC: 4.8 MMOL/L (ref 3.5–5.2)
PROT SERPL-MCNC: 6.8 G/DL (ref 6–8.5)
SODIUM SERPL-SCNC: 143 MMOL/L (ref 134–144)
T4 FREE SERPL-MCNC: 1.54 NG/DL (ref 0.82–1.77)
TRIGL SERPL-MCNC: 123 MG/DL (ref 0–149)
TSH SERPL DL<=0.005 MIU/L-ACNC: 2 UIU/ML (ref 0.45–4.5)
VLDLC SERPL CALC-MCNC: 25 MG/DL (ref 5–40)

## 2017-09-12 RX ORDER — RANITIDINE 300 MG/1
TABLET ORAL
Qty: 90 TAB | Refills: 1 | Status: SHIPPED | OUTPATIENT
Start: 2017-09-12 | End: 2018-03-11 | Stop reason: SDUPTHER

## 2017-09-12 RX ORDER — SIMVASTATIN 40 MG/1
TABLET, FILM COATED ORAL
Qty: 90 TAB | Refills: 1 | Status: SHIPPED | OUTPATIENT
Start: 2017-09-12 | End: 2018-03-11 | Stop reason: SDUPTHER

## 2017-09-25 ENCOUNTER — PATIENT MESSAGE (OUTPATIENT)
Dept: INTERNAL MEDICINE CLINIC | Age: 67
End: 2017-09-25

## 2017-09-26 NOTE — TELEPHONE ENCOUNTER
From: Sunitha Mera  To: Farheen Stout MD  Sent: 9/25/2017 2:32 PM EDT  Subject: Prescription Question    Hi Dr. Nathanael Linares. I received my Synthroid refill just before we left for 2 weeks vacation. They sent me the generic and I'm starting to feel lousy. Could you please call them and have them mail me the Synthroid. It should be there when we get home. If you need to call me my cell is [phone].   Ana Serrato

## 2017-09-26 NOTE — TELEPHONE ENCOUNTER
Spoke to [place] iron with [address] regarding the pt's concerns. He advised that per the pt's request, he has limited access to her record and would need to transfer me to another representative who is trained in her account. Spoke with Jyoti Ahn, she states that brand name Synthroid was dispensed to the pt on 08/21/17 and she was also charged a $20 co-pay for brand name medication. Jyoti Ahn request that the pt contact [facility] to further clarify any information she is uncertain of.

## 2017-11-20 RX ORDER — LEVOTHYROXINE SODIUM 100 UG/1
TABLET ORAL
Qty: 90 TAB | Refills: 1 | Status: SHIPPED | OUTPATIENT
Start: 2017-11-20 | End: 2018-05-19 | Stop reason: SDUPTHER

## 2017-12-07 ENCOUNTER — OFFICE VISIT (OUTPATIENT)
Dept: INTERNAL MEDICINE CLINIC | Age: 67
End: 2017-12-07

## 2017-12-07 VITALS
SYSTOLIC BLOOD PRESSURE: 144 MMHG | RESPIRATION RATE: 14 BRPM | BODY MASS INDEX: 26.6 KG/M2 | WEIGHT: 155.8 LBS | DIASTOLIC BLOOD PRESSURE: 72 MMHG | HEART RATE: 55 BPM | TEMPERATURE: 98.1 F | OXYGEN SATURATION: 98 % | HEIGHT: 64 IN

## 2017-12-07 DIAGNOSIS — M77.8 TENDINITIS OF RADIAL STYLOID: Primary | ICD-10-CM

## 2017-12-07 RX ORDER — METHYLPREDNISOLONE 4 MG/1
TABLET ORAL
Qty: 1 DOSE PACK | Refills: 0 | Status: SHIPPED | OUTPATIENT
Start: 2017-12-07 | End: 2018-03-13 | Stop reason: ALTCHOICE

## 2017-12-07 NOTE — PROGRESS NOTES
Chief Complaint   Patient presents with    Wrist Pain     Pt c/o L wrist pain that started 3 weeks ago after cutting bushes in her yard. Ice & aleve for the first week; aspircreme 2nd & 3rd week.

## 2017-12-07 NOTE — MR AVS SNAPSHOT
Visit Information Date & Time Provider Department Dept. Phone Encounter #  
 12/7/2017  3:00 PM [id number] Your Appointments 3/13/2018 10:40 AM  
ROUTINE CARE with MD Juli[facility] ([address]) Appt Note: 6m hypertension, hyperlipidemia, hypothyroidfu  
 [address], [address] [facility] [address] [facility] [address]  
[address], [address] [facility] [address]  
  
    
 6/8/2018  1:20 PM  
ESTABLISHED PATIENT with Nuha Crain MD  
[facility] ([address]) Appt Note: 1 yr f/u  
 [address] [place]  
[address] [facility]  
  
    
 6/27/2018  1:00 PM  
ESTABLISHED PATIENT with Nuha Crain MD  
[facility] ([address]) Appt Note: Annual  
 [address] [place]  
[phone] Upcoming Health Maintenance Date Due  
 GLAUCOMA SCREENING Q2Y 1/1/2018 MEDICARE YEARLY EXAM 1/18/2018 BREAST CANCER SCRN MAMMOGRAM 6/14/2018 COLONOSCOPY 11/9/2020 DTaP/Tdap/Td series (2 - Td) 10/16/2026 Allergies as of 12/7/2017  Review Complete On: 12/7/2017 By: Benito Kim NP Severity Noted Reaction Type Reactions Adhesive Medium 03/09/2016    Hives Cephalexin  03/10/2017    Diarrhea  
 E-mycin [Erythromycin]  12/07/2011   Side Effect Other (comments) GI upset Sudafed [Pseudoephedrine Hcl]  12/07/2011   Side Effect Palpitations Current Immunizations  Reviewed on 3/10/2017 Name Date Influenza High Dose Vaccine PF 10/11/2017, 10/31/2015 Influenza Vaccine 10/11/2016, 10/1/2014, 9/18/2013 11:12 AM  
 Pneumococcal Conjugate (PCV-13) 2/14/2017 Pneumococcal Polysaccharide (PPSV-23) 3/18/2014 Pneumococcal Vaccine (Unspecified Type) 10/1/2014 Tdap 10/16/2016 Zoster 7/9/2011 Not reviewed this visit You Were Diagnosed With   
  
 Codes Comments Tendinitis of radial styloid    -  Primary ICD-10-CM: M77.8 ICD-9-CM: 727.04 Vitals BP Pulse Temp Resp Height(growth percentile) Weight(growth percentile) 144/72 (BP 1 Location: Left arm, BP Patient Position: Sitting) (!) 55 98.1 °F (36.7 °C) (Oral) 14 5' 4\" (1.626 m) 155 lb 12.8 oz (70.7 kg) SpO2 BMI OB Status Smoking Status 98% 26.74 kg/m2 Postmenopausal Never Smoker Vitals History BMI and BSA Data Body Mass Index Body Surface Area  
 26.74 kg/m 2 1.79 m 2 Preferred Pharmacy Pharmacy Name Phone [facility] [address], [address] [place] [address] [phone] Your Updated Medication List  
  
   
This list is accurate as of: 12/7/17  3:33 PM.  Always use your most recent med list.  
  
  
  
  
 BOSWELLIA SURENDRA XT (BULK) Take  by mouth daily. Anti-inflammatory supplement. Takes one cap po daily. coenzyme q10 300 mg Cap Take 300 mg by mouth daily. FLUoxetine 20 mg capsule Commonly known as:  PROzac TAKE 1 CAPSULE DAILY  
  
 methylPREDNISolone 4 mg tablet Commonly known as:  MEDROL (ADRIAN) As directed MULTIVITAMIN PO Take  by mouth daily. Takes one po daily. PROBIOTIC PO Take 1 Cap by mouth daily. raNITIdine 300 mg tablet Commonly known as:  ZANTAC TAKE 1 TABLET DAILY  
  
 simvastatin 40 mg tablet Commonly known as:  ZOCOR  
TAKE 1 TABLET NIGHTLY  
  
 SYNTHROID 100 mcg tablet Generic drug:  levothyroxine TAKE 1 TABLET DAILY BEFORE BREAKFAST  
  
 ZYRTEC PO Take 10 mg by mouth as needed. Prescriptions Sent to Pharmacy Refills  
 methylPREDNISolone (MEDROL, ADRIAN,) 4 mg tablet 0 Sig: As directed  Class: Normal  
 Pharmacy: [facility] [address], [address] AT [facility] [address] [place] [address] #: [phone] Patient Instructions Tenosynovitis of the Wrist: Care Instructions Your Care Instructions Tenosynovitis (say \"ten-oh-sin-uh-VY-tus\") means the lining of a tendon is inflamed. This problem usually affects tendons in your thumb and wrist. A tendon is a cord that joins muscle to bone. Tenosynovitis can be caused by an injury. Or it may be caused by repeating a movement over and over, such as when you knit, lift things, or play video games. In rare cases, an infected wound causes it. In most cases, you can recover fully. But if the problem is caused by doing something over and over and you don't stop or change doing that, it may come back. Follow-up care is a key part of your treatment and safety. Be sure to make and go to all appointments, and call your doctor if you are having problems. It's also a good idea to know your test results and keep a list of the medicines you take. How can you care for yourself at home? · Prop up the sore wrist on a pillow when you ice it or anytime you sit or lie down during the next 3 days. Try to keep it above the level of your heart. This will help reduce swelling. · Put ice or cold packs on your wrist for 10 to 20 minutes at a time. Try to do this every 1 to 2 hours for the next 3 days (when you are awake) or until the swelling goes down. Put a thin cloth between the ice pack and your skin. · If your swelling is gone after 2 or 3 days, put a heating pad set on low or a warm cloth on your wrist for 15 to 20 minutes. This can reduce pain. · If your doctor gave you an elastic bandage, keep it on for the next 24 to 36 hours or for as long as your doctor told you. The bandage should be snug. But it should not be tight enough to cause numbness, tingling, or swelling. · If your doctor gave you a splint or brace, wear it as directed. It will protect your wrist until it is better. · Take pain medicines exactly as directed. ¨ If the doctor gave you a prescription medicine for pain, take it as prescribed. ¨ If you are not taking a prescription pain medicine, ask your doctor if you can take an over-the-counter medicine. · If your doctor prescribes antibiotics, take them as directed. Do not stop taking them just because you feel better. You need to take the full course of antibiotics. · Try not to use your injured wrist and hand. · After you are better, do exercises to make the muscles around your tendon stronger. This can prevent the problem from coming back. Follow instructions from your doctor. When should you call for help? Call your doctor now or seek immediate medical care if: 
? · Your hand or fingers are cool or pale or change colors. ? · You have tingling, weakness, or numbness in your hand and fingers. ? · Your pain gets worse. ? · The tendon may be infected. Signs of infection include: 
¨ Increased pain and tenderness around the wrist or thumb. ¨ Swelling or redness of the wrist or thumb. ¨ A fever. ? Watch closely for changes in your health, and be sure to contact your doctor if: 
? · You do not get better as expected. Where can you learn more? Go to http://radha-nhi.info/. Enter U262 in the search box to learn more about \"Tenosynovitis of the Wrist: Care Instructions. \" Current as of: March 21, 2017 Content Version: 11.4 © 7956-7469 [employer]. Care instructions adapted under license by [employer] (which disclaims liability or warranty for this information). If you have questions about a medical condition or this instruction, always ask your healthcare professional. [address] any warranty or liability for your use of this information. Introducing [facility]! Dear [facility]: Thank you for requesting a [employer] account.   Our records indicate that you already have an active [employer] account. You can access your account anytime at [email or web address] Did you know that you can access your hospital and ER discharge instructions at any time in [employer]? You can also review all of your test results from your hospital stay or ER visit. Additional Information If you have questions, please visit the Frequently Asked Questions section of the [employer] website at [email or web address] Remember, [employer] is NOT to be used for urgent needs. For medical emergencies, dial 911. Now available from your iPhone and Android! Please provide this summary of care documentation to your next provider. Your primary care clinician is listed as Chastity Milton. If you have any questions after today's visit, please call [phone].

## 2017-12-07 NOTE — PROGRESS NOTES
70 yo female reports onset of L wrist pain after trimming bushes in her yard about 3 weeks ago. She has used ice and took one Aleve BID for about 10 days, then used topical Aspercreme w/o permanent relief. PE: WNWD WF   BP - 144/72   L wrist - no redness or swelling; pain over radial surface with active twisting of wrist or active thumb ROM    Imp: L radial tendinitis    Plan: Medrol Dospak   Ice  ___________________  Expected course of current diagnosed problem(s) as well as expected progression and possible complications, and desired follow up with provider are discussed with patient. Patient is encouraged to be back in touch with any questions or concerns. Patient expresses understanding of plan of care. Patient is given AVS which includes diagnoses, current medications, vitals.

## 2017-12-07 NOTE — PATIENT INSTRUCTIONS
Tenosynovitis of the Wrist: Care Instructions  Your Care Instructions  Tenosynovitis (say \"ten-oh-sin-uh-VY-tus\") means the lining of a tendon is inflamed. This problem usually affects tendons in your thumb and wrist. A tendon is a cord that joins muscle to bone. Tenosynovitis can be caused by an injury. Or it may be caused by repeating a movement over and over, such as when you knit, lift things, or play video games. In rare cases, an infected wound causes it. In most cases, you can recover fully. But if the problem is caused by doing something over and over and you don't stop or change doing that, it may come back. Follow-up care is a key part of your treatment and safety. Be sure to make and go to all appointments, and call your doctor if you are having problems. It's also a good idea to know your test results and keep a list of the medicines you take. How can you care for yourself at home? · Prop up the sore wrist on a pillow when you ice it or anytime you sit or lie down during the next 3 days. Try to keep it above the level of your heart. This will help reduce swelling. · Put ice or cold packs on your wrist for 10 to 20 minutes at a time. Try to do this every 1 to 2 hours for the next 3 days (when you are awake) or until the swelling goes down. Put a thin cloth between the ice pack and your skin. · If your swelling is gone after 2 or 3 days, put a heating pad set on low or a warm cloth on your wrist for 15 to 20 minutes. This can reduce pain. · If your doctor gave you an elastic bandage, keep it on for the next 24 to 36 hours or for as long as your doctor told you. The bandage should be snug. But it should not be tight enough to cause numbness, tingling, or swelling. · If your doctor gave you a splint or brace, wear it as directed. It will protect your wrist until it is better. · Take pain medicines exactly as directed.   ¨ If the doctor gave you a prescription medicine for pain, take it as prescribed. ¨ If you are not taking a prescription pain medicine, ask your doctor if you can take an over-the-counter medicine. · If your doctor prescribes antibiotics, take them as directed. Do not stop taking them just because you feel better. You need to take the full course of antibiotics. · Try not to use your injured wrist and hand. · After you are better, do exercises to make the muscles around your tendon stronger. This can prevent the problem from coming back. Follow instructions from your doctor. When should you call for help? Call your doctor now or seek immediate medical care if:  ? · Your hand or fingers are cool or pale or change colors. ? · You have tingling, weakness, or numbness in your hand and fingers. ? · Your pain gets worse. ? · The tendon may be infected. Signs of infection include:  ¨ Increased pain and tenderness around the wrist or thumb. ¨ Swelling or redness of the wrist or thumb. ¨ A fever. ? Watch closely for changes in your health, and be sure to contact your doctor if:  ? · You do not get better as expected. Where can you learn more? Go to http://radha-nhi.info/. Enter L052 in the search box to learn more about \"Tenosynovitis of the Wrist: Care Instructions. \"  Current as of: March 21, 2017  Content Version: 11.4  © 3831-4194 [employer]wise[employer]. Care instructions adapted under license by [employer] (which disclaims liability or warranty for this information). If you have questions about a medical condition or this instruction, always ask your healthcare professional. [place] [address] any warranty or liability for your use of this information.

## 2018-03-05 RX ORDER — FLUOXETINE HYDROCHLORIDE 20 MG/1
CAPSULE ORAL
Qty: 90 CAP | Refills: 1 | Status: SHIPPED | OUTPATIENT
Start: 2018-03-05 | End: 2018-09-01 | Stop reason: SDUPTHER

## 2018-03-12 RX ORDER — RANITIDINE 300 MG/1
TABLET ORAL
Qty: 90 TAB | Refills: 1 | Status: SHIPPED | OUTPATIENT
Start: 2018-03-12 | End: 2018-09-07 | Stop reason: SDUPTHER

## 2018-03-12 RX ORDER — SIMVASTATIN 40 MG/1
TABLET, FILM COATED ORAL
Qty: 90 TAB | Refills: 1 | Status: SHIPPED | OUTPATIENT
Start: 2018-03-12 | End: 2018-09-07 | Stop reason: SDUPTHER

## 2018-03-13 ENCOUNTER — HOSPITAL ENCOUNTER (OUTPATIENT)
Dept: LAB | Age: 68
Discharge: HOME | End: 2018-03-13
Payer: MEDICARE

## 2018-03-13 ENCOUNTER — OFFICE VISIT (OUTPATIENT)
Dept: INTERNAL MEDICINE CLINIC | Age: 68
End: 2018-03-13

## 2018-03-13 VITALS
HEART RATE: 55 BPM | TEMPERATURE: 97.6 F | DIASTOLIC BLOOD PRESSURE: 72 MMHG | BODY MASS INDEX: 26.46 KG/M2 | HEIGHT: 64 IN | SYSTOLIC BLOOD PRESSURE: 118 MMHG | RESPIRATION RATE: 14 BRPM | WEIGHT: 155 LBS | OXYGEN SATURATION: 97 %

## 2018-03-13 DIAGNOSIS — E03.9 HYPOTHYROIDISM, ADULT: ICD-10-CM

## 2018-03-13 DIAGNOSIS — R03.0 ELEVATED BLOOD PRESSURE READING: ICD-10-CM

## 2018-03-13 DIAGNOSIS — E78.2 MIXED HYPERLIPIDEMIA: ICD-10-CM

## 2018-03-13 DIAGNOSIS — Z13.31 SCREENING FOR DEPRESSION: ICD-10-CM

## 2018-03-13 DIAGNOSIS — Z13.39 SCREENING FOR ALCOHOLISM: ICD-10-CM

## 2018-03-13 DIAGNOSIS — F32.A DEPRESSION, UNSPECIFIED DEPRESSION TYPE: ICD-10-CM

## 2018-03-13 DIAGNOSIS — Z79.899 ENCOUNTER FOR LONG-TERM (CURRENT) USE OF MEDICATIONS: ICD-10-CM

## 2018-03-13 DIAGNOSIS — Z00.00 MEDICARE ANNUAL WELLNESS VISIT, SUBSEQUENT: Primary | ICD-10-CM

## 2018-03-13 PROBLEM — Z71.89 ADVANCE DIRECTIVE DISCUSSED WITH PATIENT: Status: ACTIVE | Noted: 2018-03-13

## 2018-03-13 PROCEDURE — 84439 ASSAY OF FREE THYROXINE: CPT

## 2018-03-13 PROCEDURE — 36415 COLL VENOUS BLD VENIPUNCTURE: CPT

## 2018-03-13 PROCEDURE — 80053 COMPREHEN METABOLIC PANEL: CPT

## 2018-03-13 PROCEDURE — 84443 ASSAY THYROID STIM HORMONE: CPT

## 2018-03-13 PROCEDURE — 80061 LIPID PANEL: CPT

## 2018-03-13 RX ORDER — LACTOBACILLUS ACIDOPHILUS
1 POWDER (GRAM) MISCELLANEOUS
COMMUNITY
End: 2018-03-13 | Stop reason: ALTCHOICE

## 2018-03-13 RX ORDER — BOSWELLIA SERRATA EXTRACT 100 %
POWDER (GRAM) MISCELLANEOUS
COMMUNITY
End: 2018-03-13

## 2018-03-13 RX ORDER — FLUOROURACIL 50 MG/G
CREAM TOPICAL
Refills: 2 | COMMUNITY
Start: 2018-01-09 | End: 2018-03-13 | Stop reason: ALTCHOICE

## 2018-03-13 RX ORDER — RANITIDINE 300 MG/1
TABLET ORAL
COMMUNITY
Start: 2017-12-11 | End: 2018-03-13

## 2018-03-13 RX ORDER — LEVOTHYROXINE SODIUM 100 UG/1
TABLET ORAL
COMMUNITY
Start: 2017-11-20 | End: 2018-03-13 | Stop reason: SDUPTHER

## 2018-03-13 RX ORDER — ALUMINUM CHLORIDE 20 %
SOLUTION, NON-ORAL TOPICAL
Refills: 5 | COMMUNITY
Start: 2018-02-21 | End: 2018-03-13 | Stop reason: SDUPTHER

## 2018-03-13 RX ORDER — SIMVASTATIN 40 MG/1
TABLET, FILM COATED ORAL
COMMUNITY
Start: 2017-09-12 | End: 2018-03-13 | Stop reason: SDUPTHER

## 2018-03-13 RX ORDER — FLUOXETINE HYDROCHLORIDE 20 MG/1
CAPSULE ORAL
COMMUNITY
Start: 2017-09-06 | End: 2018-03-13 | Stop reason: SDUPTHER

## 2018-03-13 NOTE — PROGRESS NOTES
Subjective: Purnima Che is a 69 y.o. female who presents today for follow up of her depression, hyperlipidemia and hypothyroid. She is also here for her subsequent Medicare Wellness Exam.     She has noted that her blood pressures in the last few months has been going up - at home systolic in the hcanda of 726-593 and her diastolic is in the 57-48 range. No changes with medications. Does not watch salt in diet. No regular exercise. She denies any chest pain, shortness of breath, syncope, headaches, nausea/vomiting, or any bowel changes. 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  LACTOBACILLUS ACIDOPHILUS (PROBIOTIC PO) Take 1 Cap by mouth daily.  CETIRIZINE HCL (ZYRTEC PO) Take 10 mg by mouth as needed.  BOSWELLIA SURENDRA EXTRACT (BOSWELLIA SURENDRA XT, BULK,) Take  by mouth daily. Anti-inflammatory supplement. Takes one cap po daily.       MULTIVITAMIN PO Take  by mouth daily. Takes one po daily. Review of Systems    Pertinent items are noted in HPI. Objective:     Visit Vitals    /72 (BP 1 Location: Left arm, BP Patient Position: Sitting)    Pulse (!) 55    Temp 97.6 °F (36.4 °C) (Oral)    Resp 14    Ht 5' 4\" (1.626 m)    Wt 155 lb (70.3 kg)    SpO2 97%    BMI 26.61 kg/m2     General appearance: alert, cooperative, no distress, appears stated age  Head: Normocephalic, without obvious abnormality, atraumatic  Neck: supple, symmetrical, trachea midline, no adenopathy, thyroid: not enlarged, symmetric, no tenderness/mass/nodules, no carotid bruit and no JVD  Lungs: clear to auscultation bilaterally  Heart: regular rate and rhythm, S1, S2 normal, no murmur, click, rub or gallop  Extremities: extremities normal, atraumatic, no cyanosis or edema  Pulses: 2+ and symmetric    Assessment/Plan:     1. Medicare annual wellness visit, subsequent  -done today    - Annual  Alcohol Screen 15 min ()  - Depression Screen Annual    2. Screening for alcoholism    - Annual  Alcohol Screen 15 min ()    3. Screening for depression    - Depression Screen Annual    4. Hypothyroidism, adult  -clinically euthyroid    5. Mixed hyperlipidemia  -encouraged regular exercise and low fat diet. - METABOLIC PANEL, COMPREHENSIVE  - LIPID PANEL    6. Depression, unspecified depression type  -I evaluated and recommended to continue current doses of medications.     - TSH 3RD GENERATION  - T4, FREE    7. Encounter for long-term (current) use of medications    - METABOLIC PANEL, COMPREHENSIVE  - TSH 3RD GENERATION  - T4, FREE  - LIPID PANEL     8.  Elevated blood pressure  -in high normal range  -encouraged patient to watch the salt in her diet.   -encouraged regular exercise    Orders Placed This Encounter    Depression Screen Annual    METABOLIC PANEL, COMPREHENSIVE    TSH 3RD GENERATION    T4, FREE    LIPID PANEL    Annual  Alcohol Screen 15 min ()    DISCONTD: MULTIVITAMIN (MULTIPLE VITAMIN PO)     Sig: Take  by mouth.  DISCONTD: BOSWELLIA SURENDRA EXTRACT (BOSWELLIA SURENDRA XT, BULK,) 100 % powd     Sig: Take  by mouth.  DISCONTD: coenzyme q10 300 mg cap     Sig: Take 300 mg by mouth.  DISCONTD: FLUoxetine (PROZAC) 20 mg capsule     Sig: TAKE 1 CAPSULE DAILY       Follow-up Disposition:     Follow up in 6 months     Return if symptoms worsen or fail to improve. Advised patient to call back or return to office if symptoms worsen/change/persist.     Discussed expected course/resolution/complications of diagnosis in detail with patient. Medication risks/benefits/costs/interactions/alternatives discussed with patient. Patient was given an after visit summary which includes diagnoses, current medications, & vitals. Patient expressed understanding with the diagnosis and plan. This is the Subsequent Medicare Annual Wellness Exam, performed 12 months or more after the Initial AWV or the last Subsequent AWV    I have reviewed the patient's medical history in detail and updated the computerized patient record. History     Past Medical History:   Diagnosis Date    Arthritis     lower back    Cancer ([address]     BCCAs removed - 6 on legs    Depression     Environmental allergies 12/7/2011    Fibrocystic breast 12/7/2011    GERD (gastroesophageal reflux disease)     Hiatal hernia 12/7/2011    HIGH BLOOD PRESSURE     not on meds    HTN (hypertension) 12/7/2011    Hypothyroid 12/7/2011    Ill-defined condition     prediabetic    Nausea & vomiting     S/P hemorrhoidectomy 12/7/2011    S/P laparoscopic cholecystectomy 12/7/2011    S/p tibial fracture 12/7/2011    Sleep apnea     CPAP not used/was mild and pt states not a problem now with wt loss    Tendonitis of wrist, left 12/2017    cortisone shot    Thyroid disorder       Past Surgical History:   Procedure Laterality Date    ENDOSCOPY, COLON, DIAGNOSTIC  11/2010    ([facility])-f/u 5 yrs.     HX BREAST LUMPECTOMY      right - benign    HX CHOLECYSTECTOMY      HX GI      hemorrhoidectomy    HX ORTHOPAEDIC      surgery for tibial plateau fx - took bone from hip/has hardware knee to ankle     Current Outpatient Prescriptions   Medication Sig Dispense Refill    raNITIdine (ZANTAC) 300 mg tablet TAKE 1 TABLET DAILY (Patient taking differently: TAKE 1 TABLET  DAILY PRN) 90 Tab 1    simvastatin (ZOCOR) 40 mg tablet TAKE 1 TABLET NIGHTLY 90 Tab 1    FLUoxetine (PROZAC) 20 mg capsule TAKE 1 CAPSULE DAILY 90 Cap 1    SYNTHROID 100 mcg tablet TAKE 1 TABLET DAILY BEFORE BREAKFAST 90 Tab 1    coenzyme q10 300 mg cap Take 300 mg by mouth daily.  LACTOBACILLUS ACIDOPHILUS (PROBIOTIC PO) Take 1 Cap by mouth daily.  CETIRIZINE HCL (ZYRTEC PO) Take 10 mg by mouth as needed.  BOSWELLIA SURENDRA EXTRACT (BOSWELLIA SURENDRA XT, BULK,) Take  by mouth daily. Anti-inflammatory supplement. Takes one cap po daily.  MULTIVITAMIN PO Take  by mouth daily. Takes one po daily.        Allergies   Allergen Reactions    Adhesive Hives    Cephalexin Diarrhea    E-Mycin [Erythromycin] Other (comments)     GI upset    Sudafed [Pseudoephedrine Hcl] Palpitations     Family History   Problem Relation Age of Onset    Alcohol abuse Mother      cirrhosis    Alcohol abuse Father     Other Father      cerebral hemorrhage    Cancer Brother      prostate    Heart Disease Brother 46     MI(age 46), bypass(66)    Diabetes Maternal Aunt     Hypertension Maternal Aunt     Cancer Maternal Uncle      COLON    Heart Disease Maternal Grandmother      MI    Cancer Cousin      BREAST     Social History   Substance Use Topics    Smoking status: Never Smoker    Smokeless tobacco: Never Used    Alcohol use 4.2 oz/week     7 Glasses of wine per week     Patient Active Problem List   Diagnosis Code    Hypothyroid E03.9    Depression F32.9    HTN (hypertension) I10    Fibrocystic breast N60.19    S/p tibial fracture Z87.81  S/P hemorrhoidectomy Z98.890, Z87.19    Environmental allergies Z91.09    S/P laparoscopic cholecystectomy Z90.49    Hiatal hernia K44.9    Colon cancer screening Z12.11    History of screening mammography Z92.89    Pap smear for cervical cancer screening Z12.4    Hyperlipidemia E78.5    Nuclear cataract H25.10    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 Functional Ability and Level of Safety:   Hearing Loss  Hearing is good. Activities of Daily Living  The home contains: no safety equipment. Patient does total self care    Fall Risk  Fall Risk Assessment, last 12 mths 9/11/2017   Able to walk? Yes   Fall in past 12 months? No       Abuse Screen  Patient is not abused    Cognitive Screening   Evaluation of Cognitive Function:  Has your family/caregiver stated any concerns about your memory: no  Normal    Patient Care Team   Patient Care Team:  Leelee Davis MD as PCP - General (Internal Medicine)  Sharron Grove MD as Physician (Orthopedic Surgery)  Nate Ignacio MD as Physician (Pain Management)  Harpreet Stone MD as Physician (Ophthalmology)  Mary Parmar MD as Physician (Gynecology)  Christen García MD (Cardiology)    Assessment/Plan   Education and counseling provided:  Are appropriate based on today's review and evaluation    Diagnoses and all orders for this visit:    1. Medicare annual wellness visit, subsequent  -     Annual  Alcohol Screen 15 min ()  -     Depression Screen Annual    2. Screening for alcoholism  -     Annual  Alcohol Screen 15 min ()    3.  Screening for depression  -     Depression Screen Annual    Eye exam- 4/2017 - Dr. Reid Bansal.

## 2018-03-13 NOTE — PROGRESS NOTES
Patient's identity verified with two patient identifiers (name and date of birth). 1. Have you been to the ER, urgent care clinic since your last visit? Hospitalized since your last visit? No  2. Have you seen or consulted any other health care providers outside of the [address] [place] [address] since your last visit? Include any pap smears or colon screening. Yes, ortho w/ Dr. Tiny Bains for cortisone shot in left wrist.    Chief Complaint   Patient presents with    Hypertension     6 month follow up, complains of elevation recently. Has these written down with her today, ranging 283-786M systolic, 36-37V diastolic. Has felt stressed.  Cholesterol Problem     6 month follow up    Thyroid Problem     6 month follow up     Fasting. Health Maintenance Due   Topic Date Due    GLAUCOMA SCREENING Q2Y   Scheduled for 4/2018. 01/01/2018    MEDICARE YEARLY EXAM   Due. 01/18/2018    BREAST CANCER SCRN MAMMOGRAM   Due 6/2018 per patient. 06/14/2018     Reviewed record in preparation for visit and have obtained necessary documentation.     Wt Readings from Last 3 Encounters:   03/13/18 155 lb (70.3 kg)   12/07/17 155 lb 12.8 oz (70.7 kg)   09/11/17 153 lb (69.4 kg)     Temp Readings from Last 3 Encounters:   03/13/18 97.6 °F (36.4 °C) (Oral)   12/07/17 98.1 °F (36.7 °C) (Oral)   09/11/17 96.9 °F (36.1 °C) (Oral)     BP Readings from Last 3 Encounters:   03/13/18 118/72   12/07/17 144/72   09/11/17 116/80     Pulse Readings from Last 3 Encounters:   03/13/18 (!) 55   12/07/17 (!) 55   09/11/17 (!) 48       Learning Assessment:  :     Learning Assessment 3/13/2018 2/4/2015 1/28/2014 1/20/2014 1/10/2013   PRIMARY LEARNER Patient Patient Patient Patient Patient   HIGHEST LEVEL OF EDUCATION - PRIMARY LEARNER  GRADUATED HIGH SCHOOL OR GED GRADUATED HIGH SCHOOL OR GED GRADUATED HIGH SCHOOL OR GED GRADUATED HIGH SCHOOL OR GED -   BARRIERS PRIMARY LEARNER NONE NONE NONE NONE -   CO-LEARNER CAREGIVER No No No No -   PRIMARY LANGUAGE ENGLISH ENGLISH ENGLISH ENGLISH ENGLISH    NEED - No - - -   LEARNER PREFERENCE PRIMARY DEMONSTRATION LISTENING LISTENING DEMONSTRATION DEMONSTRATION     - DEMONSTRATION DEMONSTRATION - -   LEARNING SPECIAL TOPICS - no - - -   ANSWERED BY patient patient Lexus Georges patient patient   RELATIONSHIP SELF SELF SELF SELF SELF

## 2018-03-13 NOTE — PATIENT INSTRUCTIONS

## 2018-03-13 NOTE — MR AVS SNAPSHOT
[address] [facility] 
[phone] Patient: Vangie Eng MRN: [id number] Visit Information Date & Time Provider Department Dept. Phone Encounter #  
 3/13/2018 10:40 AM Ella Gallardo MD [place] [id number] [facility] [phone] [id number] Follow-up Instructions Return in about 6 months (around 2018) for hyperlipidemia, hypothyroid, depression. Your Appointments 2018  1:20 PM  
ESTABLISHED PATIENT with Amy Ramsey MD  
[facility] ([address]) Appt Note: 1 yr f/u  
 [address] [place] [address]  
[address] [facility]  
  
    
 2018  1:00 PM  
ESTABLISHED PATIENT with Amy Ramsey MD  
[facility] ([facility]) Appt Note: Annual  
 [address] [place] [address]  
[phone] Upcoming Health Maintenance Date Due  
 BREAST CANCER SCRN MAMMOGRAM 2018 GLAUCOMA SCREENING Q2Y 2018* MEDICARE YEARLY EXAM 3/14/2019 COLONOSCOPY 2020 DTaP/Tdap/Td series (2 - Td) 10/16/2026 *Topic was postponed. The date shown is not the original due date. Allergies as of 3/13/2018  Review Complete On: 3/13/2018 By: Ella Gallardo MD  
  
 Severity Noted Reaction Type Reactions Adhesive Medium 2016    Hives Cephalexin  03/10/2017    Diarrhea  
 E-mycin [Erythromycin]  2011   Side Effect Other (comments) GI upset Sudafed [Pseudoephedrine Hcl]  2011   Side Effect Palpitations Current Immunizations  Reviewed on 3/10/2017 Name Date Influenza High Dose Vaccine PF 10/11/2017, 10/31/2015 Influenza Vaccine 10/11/2016, 10/1/2014, 2013 11:12 AM  
 Pneumococcal Conjugate (PCV-13) 2017 Pneumococcal Polysaccharide (PPSV-23) 3/18/2014 Pneumococcal Vaccine (Unspecified Type) 10/1/2014 Tdap 10/16/2016 Zoster 7/9/2011 Not reviewed this visit You Were Diagnosed With   
  
 Codes Comments Medicare annual wellness visit, subsequent    -  Primary ICD-10-CM: Z00.00 ICD-9-CM: V70.0 Screening for alcoholism     ICD-10-CM: Z13.89 ICD-9-CM: V79.1 Screening for depression     ICD-10-CM: Z13.89 ICD-9-CM: V79.0 Hypothyroidism, adult     ICD-10-CM: E03.9 ICD-9-CM: 244.9 Mixed hyperlipidemia     ICD-10-CM: E78.2 ICD-9-CM: 272.2 Depression, unspecified depression type     ICD-10-CM: F32.9 ICD-9-CM: 943 Encounter for long-term (current) use of medications     ICD-10-CM: Z79.899 ICD-9-CM: V58.69 Vitals BP Pulse Temp Resp Height(growth percentile) Weight(growth percentile) 118/72 (BP 1 Location: Left arm, BP Patient Position: Sitting) (!) 55 97.6 °F (36.4 °C) (Oral) 14 5' 4\" (1.626 m) 155 lb (70.3 kg) SpO2 BMI OB Status Smoking Status 97% 26.61 kg/m2 Postmenopausal Never Smoker Vitals History BMI and BSA Data Body Mass Index Body Surface Area  
 26.61 kg/m 2 1.78 m 2 Preferred Pharmacy Pharmacy Name Phone [address], [facility] [phone] Your Updated Medication List  
  
   
This list is accurate as of 3/13/18 11:23 AM.  Always use your most recent med list.  
  
  
  
  
 BOSWELLIA SURENDRA XT (BULK) Take  by mouth daily. Anti-inflammatory supplement. Takes one cap po daily. coenzyme q10 300 mg Cap Take 300 mg by mouth daily. FLUoxetine 20 mg capsule Commonly known as:  PROzac TAKE 1 CAPSULE DAILY MULTIVITAMIN PO Take  by mouth daily. Takes one po daily. PROBIOTIC PO Take 1 Cap by mouth daily. raNITIdine 300 mg tablet Commonly known as:  ZANTAC TAKE 1 TABLET DAILY  
  
 simvastatin 40 mg tablet Commonly known as:  ZOCOR  
TAKE 1 TABLET NIGHTLY SYNTHROID 100 mcg tablet Generic drug:  levothyroxine TAKE 1 TABLET DAILY BEFORE BREAKFAST  
  
 ZYRTEC PO Take 10 mg by mouth as needed. We Performed the Following [address] [[id number] HCPCS] LIPID PANEL [19527 CPT(R)] METABOLIC PANEL, COMPREHENSIVE [24755 CPT(R)] MD ANNUAL ALCOHOL SCREEN 15 MIN [id number] HCPCS] T4, FREE [id number] CPT(R)] TSH 3RD GENERATION [46426 CPT(R)] Follow-up Instructions Return in about 6 months (around 9/13/2018) for hyperlipidemia, hypothyroid, depression. Patient Instructions Medicare Wellness Visit, Female The best way to live healthy is to have a healthy lifestyle by eating a well-balanced diet, exercising regularly, limiting alcohol and stopping smoking. Regular physical exams and screening tests are another way to keep healthy. Preventive exams provided by your health care provider can find health problems before they become diseases or illnesses. Preventive services including immunizations, screening tests, monitoring and exams can help you take care of your own health. All people over age 72 should have a pneumovax  and and a prevnar shot to prevent pneumonia. These are once in a lifetime unless you and your provider decide differently. All people over 65 should have a yearly flu shot and a tetanus vaccine every 10 years. A bone mass density to screen for osteoporosis or thinning of the bones should be done every 2 years after 65. Screening for diabetes mellitus with a blood sugar test should be done every year. Glaucoma is a disease of the eye due to increased ocular pressure that can lead to blindness and it should be done every year by an eye professional. 
 
Cardiovascular screening tests that check for elevated lipids (fatty part of blood) which can lead to heart disease and strokes should be done every 5 years. Colorectal screening that evaluates for blood or polyps in your colon should be done yearly as a stool test or every five years as a flexible sigmoidoscope or every 10 years as a colonoscopy up to age 76. Breast cancer screening with a mammogram is recommended biennially  for women age 54-69. Screening for cervical cancer with a pap smear and pelvic exam is recommended for women after age 72 years every 2 years up to age 79 or when the provider and patient decide to stop. If there is a history of cervical abnormalities or other increased risk for cancer then the test is recommended yearly. Hepatitis C screening is also recommended for anyone born between [birth year] through [address]. A shingles vaccine is also recommended once in a lifetime after age 61. Your Medicare Wellness Exam is recommended annually. Here is a list of your current Health Maintenance items with a due date: 
Health Maintenance Due Topic Date Due  
 Breast Cancer Screening  06/14/2018 Introducing [facility]! Dear Mirtha Ayers: Thank you for requesting a "1,2,3 [employer]" account. Our records indicate that you already have an active "1,2,3 Listo" account. You can access your account anytime at [email or web address] Did you know that you can access your hospital and ER discharge instructions at any time in "1,2,3 Listo"? You can also review all of your test results from your hospital stay or ER visit. Additional Information If you have questions, please visit the Frequently Asked Questions section of the "1,2,3 [employer]o" website at [email or web address] Remember, "1,2,3 Listo" is NOT to be used for urgent needs. For medical emergencies, dial 911. Now available from your iPhone and Android! Please provide this summary of care documentation to your next provider. Your primary care clinician is listed as Chastity Milton. If you have any questions after today's visit, please call [phone].

## 2018-03-14 LAB
ALBUMIN SERPL-MCNC: 4.5 G/DL (ref 3.6–4.8)
ALBUMIN/GLOB SERPL: 2.3 {RATIO} (ref 1.2–2.2)
ALP SERPL-CCNC: 95 IU/L (ref 39–117)
ALT SERPL-CCNC: 20 IU/L (ref 0–32)
AST SERPL-CCNC: 23 IU/L (ref 0–40)
BILIRUB SERPL-MCNC: 0.4 MG/DL (ref 0–1.2)
BUN SERPL-MCNC: 16 MG/DL (ref 8–27)
BUN/CREAT SERPL: 26 (ref 12–28)
CALCIUM SERPL-MCNC: 9.3 MG/DL (ref 8.7–10.3)
CHLORIDE SERPL-SCNC: 100 MMOL/L (ref 96–106)
CHOLEST SERPL-MCNC: 194 MG/DL (ref 100–199)
CO2 SERPL-SCNC: 25 MMOL/L (ref 18–29)
CREAT SERPL-MCNC: 0.62 MG/DL (ref 0.57–1)
GFR SERPLBLD CREATININE-BSD FMLA CKD-EPI: 106 ML/MIN/1.73
GFR SERPLBLD CREATININE-BSD FMLA CKD-EPI: 92 ML/MIN/1.73
GLOBULIN SER CALC-MCNC: 2 G/DL (ref 1.5–4.5)
GLUCOSE SERPL-MCNC: 88 MG/DL (ref 65–99)
HDLC SERPL-MCNC: 66 MG/DL
INTERPRETATION, 910389: NORMAL
LDLC SERPL CALC-MCNC: 96 MG/DL (ref 0–99)
POTASSIUM SERPL-SCNC: 4.3 MMOL/L (ref 3.5–5.2)
PROT SERPL-MCNC: 6.5 G/DL (ref 6–8.5)
SODIUM SERPL-SCNC: 142 MMOL/L (ref 134–144)
T4 FREE SERPL-MCNC: 1.48 NG/DL (ref 0.82–1.77)
TRIGL SERPL-MCNC: 158 MG/DL (ref 0–149)
TSH SERPL DL<=0.005 MIU/L-ACNC: 1.87 UIU/ML (ref 0.45–4.5)
VLDLC SERPL CALC-MCNC: 32 MG/DL (ref 5–40)

## 2018-05-14 ENCOUNTER — OFFICE VISIT (OUTPATIENT)
Dept: INTERNAL MEDICINE CLINIC | Age: 68
End: 2018-05-14

## 2018-05-14 VITALS
BODY MASS INDEX: 26.21 KG/M2 | TEMPERATURE: 97.9 F | HEIGHT: 64 IN | WEIGHT: 153.5 LBS | DIASTOLIC BLOOD PRESSURE: 80 MMHG | OXYGEN SATURATION: 95 % | RESPIRATION RATE: 14 BRPM | HEART RATE: 53 BPM | SYSTOLIC BLOOD PRESSURE: 130 MMHG

## 2018-05-14 DIAGNOSIS — J30.2 SEASONAL ALLERGIC RHINITIS, UNSPECIFIED TRIGGER: ICD-10-CM

## 2018-05-14 DIAGNOSIS — J40 BRONCHITIS: Primary | ICD-10-CM

## 2018-05-14 RX ORDER — AMOXICILLIN 500 MG/1
CAPSULE ORAL
COMMUNITY
Start: 2018-05-05 | End: 2018-06-27 | Stop reason: ALTCHOICE

## 2018-05-14 RX ORDER — METHYLPREDNISOLONE 4 MG/1
TABLET ORAL
Qty: 1 DOSE PACK | Refills: 0 | Status: SHIPPED | OUTPATIENT
Start: 2018-05-14 | End: 2018-06-27 | Stop reason: ALTCHOICE

## 2018-05-14 RX ORDER — FLUTICASONE PROPIONATE 50 MCG
2 SPRAY, SUSPENSION (ML) NASAL DAILY
Qty: 1 BOTTLE | Refills: 0 | Status: SHIPPED | OUTPATIENT
Start: 2018-05-14 | End: 2018-06-11 | Stop reason: SDUPTHER

## 2018-05-14 NOTE — PROGRESS NOTES
Chief Complaint   Patient presents with    Cough     Pt c/o coughing x2 weeks. Pt was seen at [facility] for persistent cough 5/5/18. 1. Have you been to the ER, urgent care clinic since your last visit? Hospitalized since your last visit? No    2. Have you seen or consulted any other health care providers outside of the [address] [place] [address] since your last visit? Include any pap smears or colon screening.  No

## 2018-05-14 NOTE — PROGRESS NOTES
HISTORY OF PRESENT ILLNESS  Elaina Mohs is a 69 y.o. female. Patient reports cough for about 3 weeks with cloudy sputum and post-nasal drip. She recently took a trip to [facility] and soon after her return developed URI symptoms after her . She went to [facility] 9 days ago with a fever of 100 and was diagnosed with a sinus infection. CXR was negative for pneumonia. She was given 10 days of amoxicillin and tessalon with little improvement in her cough. She finishes the antibiotic tomorrow. Cough is consistent throughout the day, sometimes worse at night. Her chest wall is sore from coughing and sometimes she has difficulty catching her breath after a coughing spell. Patient is taking zyrtec for her allergies. Visit Vitals    /80 (BP 1 Location: Left arm, BP Patient Position: Sitting)    Pulse (!) 53    Temp 97.9 °F (36.6 °C) (Oral)    Resp 14    Ht 5' 4\" (1.626 m)    Wt 153 lb 8 oz (69.6 kg)    SpO2 95%    BMI 26.35 kg/m2       Cough   The history is provided by the patient. This is a new problem. Episode onset: over 3 weeks ago. The problem has been gradually improving. Treatments tried: amoxicillin, tessalon. The treatment provided mild relief. Review of Systems   HENT: Positive for congestion. Respiratory: Positive for cough and sputum production. Physical Exam   Constitutional: She is oriented to person, place, and time. She appears well-developed and well-nourished. HENT:   Head: Normocephalic. Right Ear: Hearing, external ear and ear canal normal.   Left Ear: Hearing, tympanic membrane, external ear and ear canal normal.   Nose: Mucosal edema and rhinorrhea present. Mouth/Throat: Uvula is midline, oropharynx is clear and moist and mucous membranes are normal.   Clear fluid noted behind both TMs, Right TM with bubbles of fluid, no erythema   Neck: Normal range of motion. Neck supple. Cardiovascular: Normal rate, regular rhythm and normal heart sounds. Pulmonary/Chest: Effort normal and breath sounds normal.   Neurological: She is alert and oriented to person, place, and time. Skin: Skin is warm and dry. Psychiatric: She has a normal mood and affect. ASSESSMENT and PLAN    ICD-10-CM ICD-9-CM    1. Bronchitis J40 490    2.  Seasonal allergic rhinitis, unspecified trigger J30.2 477.9      Orders Placed This Encounter    amoxicillin (AMOXIL) 500 mg capsule    fluticasone (FLONASE) 50 mcg/actuation nasal spray    methylPREDNISolone (MEDROL DOSEPACK) 4 mg tablet   add flonase to allergy regimen  Consider switching to Allegra  Follow-up if no improvement over the next week

## 2018-05-21 RX ORDER — LEVOTHYROXINE SODIUM 100 UG/1
TABLET ORAL
Qty: 90 TAB | Refills: 1 | Status: SHIPPED | OUTPATIENT
Start: 2018-05-21 | End: 2018-11-15 | Stop reason: SDUPTHER

## 2018-06-11 RX ORDER — FLUTICASONE PROPIONATE 50 MCG
SPRAY, SUSPENSION (ML) NASAL
Qty: 1 BOTTLE | Refills: 0 | Status: SHIPPED | OUTPATIENT
Start: 2018-06-11 | End: 2018-06-27 | Stop reason: ALTCHOICE

## 2018-06-27 ENCOUNTER — OFFICE VISIT (OUTPATIENT)
Dept: CARDIOLOGY CLINIC | Age: 68
End: 2018-06-27

## 2018-06-27 VITALS
OXYGEN SATURATION: 96 % | WEIGHT: 156 LBS | BODY MASS INDEX: 26.63 KG/M2 | DIASTOLIC BLOOD PRESSURE: 68 MMHG | HEIGHT: 64 IN | RESPIRATION RATE: 18 BRPM | HEART RATE: 60 BPM | SYSTOLIC BLOOD PRESSURE: 118 MMHG

## 2018-06-27 DIAGNOSIS — R93.1 AGATSTON CAC SCORE 100-199: Primary | ICD-10-CM

## 2018-06-27 DIAGNOSIS — I10 ESSENTIAL HYPERTENSION: ICD-10-CM

## 2018-06-27 DIAGNOSIS — Z82.49 FAMILY HISTORY OF PREMATURE CAD: ICD-10-CM

## 2018-06-27 DIAGNOSIS — E78.2 MIXED HYPERLIPIDEMIA: ICD-10-CM

## 2018-06-27 NOTE — PROGRESS NOTES
HISTORY OF PRESENT ILLNESS  Alissa Jacobs is a 69 y.o. female     SUMMARY:   Problem List  Date Reviewed: 6/27/2018          Codes Class Noted    Advance directive discussed with patient ICD-10-CM: Z71.89  ICD-9-CM: V65.49  3/13/2018    Overview Signed 3/13/2018 11:13 AM by Jade Reyez MD     3/13/2018 - -patient has an established AD. Will bring copy in for chart. 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 Polyps removed. Repeat done 12/2016, polyps removed; follow-up in 3 years.              History of screening mammography (Chronic) ICD-10-CM: Z92.89  ICD-9-CM: V15.89  7/10/2012    Overview Addendum 3/13/2018 11:09 AM by Jade Reyez MD     [facility], 6/2017             Pap smear for cervical cancer screening (Chronic) ICD-10-CM: Z12.4  ICD-9-CM: V76.2  7/10/2012    Overview Addendum 3/10/2017 11:02 AM by Jade Reyez MD     6/16, [facility]             Hypothyroid ICD-10-CM: E03.9  ICD-9-CM: 244.9  12/7/2011        Depression ICD-10-CM: F32.9  ICD-9-CM: 562  12/7/2011        HTN (hypertension) ICD-10-CM: I10  ICD-9-CM: 401.9  12/7/2011        Fibrocystic breast ICD-10-CM: N60.19  ICD-9-CM: 610.1  12/7/2011        S/p tibial fracture ICD-10-CM: Z87.81  ICD-9-CM: V54.16  12/7/2011    Overview Signed 12/7/2011  2:17 PM by Ana Woodard MD     Right with venkatesh placement, 1995             S/P hemorrhoidectomy ICD-10-CM: I33.554, Z87.19  ICD-9-CM: V45.89  12/7/2011    Overview Signed 12/7/2011  2:18 PM by MD Dr. Rea Russell             Environmental allergies ICD-10-CM: Z91.09  ICD-9-CM: V15.09  12/7/2011        S/P laparoscopic cholecystectomy ICD-10-CM: Z90.49  ICD-9-CM: V45.89  12/7/2011        Hiatal hernia ICD-10-CM: K44.9  ICD-9-CM: 553.3  12/7/2011    Overview Signed 12/7/2011  2:18 PM by Ana Woodard MD     egd 11/9/10, Dr. Naseem Mcmillan Prescriptions on File Prior to Visit   Medication Sig    SYNTHROID 100 mcg tablet TAKE 1 TABLET DAILY BEFORE BREAKFAST    raNITIdine (ZANTAC) 300 mg tablet TAKE 1 TABLET DAILY (Patient taking differently: TAKE 1 TABLET  DAILY PRN)    simvastatin (ZOCOR) 40 mg tablet TAKE 1 TABLET NIGHTLY    FLUoxetine (PROZAC) 20 mg capsule TAKE 1 CAPSULE DAILY    coenzyme q10 300 mg cap Take 300 mg by mouth daily.  LACTOBACILLUS ACIDOPHILUS (PROBIOTIC PO) Take 1 Cap by mouth daily.  BOSWELLIA SURENDRA EXTRACT (BOSWELLIA SURENDRA XT, BULK,) Take  by mouth daily. Anti-inflammatory supplement. Takes one cap po daily.  MULTIVITAMIN PO Take  by mouth daily. Takes one po daily.  fluticasone (FLONASE) 50 mcg/actuation nasal spray INSTILL 2 SPRAYS IN BOTH NOSTRILS DAILY (Patient taking differently: No sig reported)    amoxicillin (AMOXIL) 500 mg capsule     methylPREDNISolone (MEDROL DOSEPACK) 4 mg tablet Follow dose pack instructions    CETIRIZINE HCL (ZYRTEC PO) Take 10 mg by mouth as needed. No current facility-administered medications on file prior to visit.         CARDIOLOGY STUDIES TO DATE:  12/11 normal stress echo at [facility] (notes reviewed and summarized under media tab)    5/16 score 188, 80th percentile  6/17 normal stress echo        Chief Complaint Patient presents with    Abnormal Cardiac Test     HPI :  Ms. Orellana is doing okay. Unfortunately, she is not getting any regular exercise and she has gained a few pounds since we last met. A while back, she was out on a real, real hot day trimming bushes in her backyard and she got short of breath after a bit and felt like her heart was pounding. No chest pain or anything. She got back in the house and once she cooled down everything settled. She has avoided the hot weather since and has had no further episodes. Recent lipid profile looked good, although her LDL is creeping up.          CARDIAC ROS:   negative for chest pain, syncope, orthopnea, paroxysmal nocturnal dyspnea, exertional chest pressure/discomfort, claudication, lower extremity edema    Family History   Problem Relation Age of Onset    Alcohol abuse Mother      cirrhosis    Alcohol abuse Father     Other Father      cerebral hemorrhage    Cancer Brother      prostate    Heart Disease Brother 46     MI(age 46), bypass(66)    Diabetes Maternal Aunt     Hypertension Maternal Aunt     Cancer Maternal Uncle      COLON    Heart Disease Maternal Grandmother      MI    Cancer Cousin      BREAST       Past Medical History:   Diagnosis Date    Arthritis     lower back    Cancer ([address]     BCCAs removed - 6 on legs    Depression     Environmental allergies 12/7/2011    Fibrocystic breast 12/7/2011    GERD (gastroesophageal reflux disease)     Hiatal hernia 12/7/2011    HIGH BLOOD PRESSURE     not on meds    HTN (hypertension) 12/7/2011    Hypothyroid 12/7/2011    Ill-defined condition     prediabetic    Nausea & vomiting     S/P hemorrhoidectomy 12/7/2011    S/P laparoscopic cholecystectomy 12/7/2011    S/p tibial fracture 12/7/2011    Sleep apnea     CPAP not used/was mild and pt states not a problem now with wt loss    Tendonitis of wrist, left 12/2017    cortisone shot    Thyroid disorder        GENERAL ROS:  A comprehensive review of systems was negative except for that written in the HPI. Visit Vitals    /68 (BP 1 Location: Right arm, BP Patient Position: Sitting)    Pulse 60    Resp 18    Ht 5' 4\" (1.626 m)    Wt 156 lb (70.8 kg)    SpO2 96%    BMI 26.78 kg/m2       Wt Readings from Last 3 Encounters:   06/27/18 156 lb (70.8 kg)   05/14/18 153 lb 8 oz (69.6 kg)   03/13/18 155 lb (70.3 kg)            BP Readings from Last 3 Encounters:   06/27/18 118/68   05/14/18 130/80   03/13/18 118/72       PHYSICAL EXAM  General appearance: alert, cooperative, no distress, appears stated age  Neck: supple, symmetrical, trachea midline, no adenopathy, no carotid bruit and no JVD  Lungs: clear to auscultation bilaterally  Heart: regular rate and rhythm, S1, S2 normal, no murmur, click, rub or gallop  Extremities: extremities normal, atraumatic, no cyanosis or edema    Lab Results   Component Value Date/Time    Cholesterol, total 194 03/13/2018 11:31 AM    Cholesterol, total 184 09/11/2017 12:00 AM    Cholesterol, total 164 03/10/2017 11:08 AM    Cholesterol, total 167 09/09/2016 11:29 AM    Cholesterol, total 191 09/10/2015 01:14 PM    HDL Cholesterol 66 03/13/2018 11:31 AM    HDL Cholesterol 72 09/11/2017 12:00 AM    HDL Cholesterol 73 03/10/2017 11:08 AM    HDL Cholesterol 71 09/09/2016 11:29 AM    HDL Cholesterol 65 09/10/2015 01:14 PM    LDL, calculated 96 03/13/2018 11:31 AM    LDL, calculated 87 09/11/2017 12:00 AM    LDL, calculated 70 03/10/2017 11:08 AM    LDL, calculated 68 09/09/2016 11:29 AM    LDL, calculated 95 09/10/2015 01:14 PM    Triglyceride 158 (H) 03/13/2018 11:31 AM    Triglyceride 123 09/11/2017 12:00 AM    Triglyceride 104 03/10/2017 11:08 AM    Triglyceride 139 09/09/2016 11:29 AM    Triglyceride 154 (H) 09/10/2015 01:14 PM    CHOL/HDL Ratio 2.4 07/15/2010 11:10 AM     ASSESSMENT  I do not think the episode she had a few weeks ago was a cardiac event because I would expect her to have ongoing symptoms.   I really think she just needs to start a walking program and get her weight back down, though if she has any further problems she will let us know and at the bare minimum we will do another stress test.           current treatment plan is effective, no change in therapy  lab results and schedule of future lab studies reviewed with patient  reviewed diet, exercise and weight control    Encounter Diagnoses   Name Primary?  Agatston CAC score 100-199 Yes    Mixed hyperlipidemia     Essential hypertension     Family history of premature CAD      No orders of the defined types were placed in this encounter. Follow-up Disposition:  Return in about 1 year (around 6/27/2019).     Cameron Perez MD  6/27/2018

## 2018-06-27 NOTE — PROGRESS NOTES
Pt stated that she was SOB on Sunday while working in the garden per the patient it was very hot that day and she is not sure if it was the heat or her heart.

## 2018-06-27 NOTE — MR AVS SNAPSHOT
[address] [facility] [address] 
[phone] Patient: Gia Arzate MRN: [id number] :1950 Visit Information Date & Time Provider Department Dept. Phone Encounter #  
 2018  1:00 PM Graciela Mckenzie MD [facility] [phone] [id number] Follow-up Instructions Return in about 1 year (around 2019). Your Appointments 2018 10:40 AM  
ROUTINE CARE with Farideh Bautista MD  
[address] [facility] ([facility]) Appt Note: 6 mths for hyperlipidemia, hypothyroid, depression. [address], [address] [place] [address]  
[address] [place] [address]  
  
    
 2019  1:00 PM  
ESTABLISHED PATIENT with Graciela Mckenzie MD  
[facility] ([facility]) Appt Note: 1 yr f/u  
 [address]  
[address] [facility] [address] Upcoming Health Maintenance Date Due  
 GLAUCOMA SCREENING Q2Y 2018 BREAST CANCER SCRN MAMMOGRAM 2018 Influenza Age 5 to Adult 2018 MEDICARE YEARLY EXAM 3/14/2019 COLONOSCOPY 2020 DTaP/Tdap/Td series (2 - Td) 10/16/2026 Allergies as of 2018  Review Complete On: 2018 By: Graciela Mckenzie MD  
  
 Severity Noted Reaction Type Reactions Adhesive Medium 2016    Hives Cephalexin  03/10/2017    Diarrhea  
 E-mycin [Erythromycin]  2011   Side Effect Other (comments) GI upset Sudafed [Pseudoephedrine Hcl]  2011   Side Effect Palpitations Current Immunizations  Reviewed on 3/10/2017 Name Date Influenza High Dose Vaccine PF 10/11/2017, 10/31/2015 Influenza Vaccine 10/11/2016, 10/1/2014, 2013 11:12 AM  
 Pneumococcal Conjugate (PCV-13) 2017 Pneumococcal Polysaccharide (PPSV-23) 3/18/2014 Pneumococcal Vaccine (Unspecified Type) 10/1/2014 Tdap 10/16/2016 Zoster 7/9/2011 Not reviewed this visit You Were Diagnosed With   
  
 Codes Comments Agatston CAC score 100-199    -  Primary ICD-10-CM: R93.1 ICD-9-CM: 793.2 Mixed hyperlipidemia     ICD-10-CM: E78.2 ICD-9-CM: 272.2 Essential hypertension     ICD-10-CM: I10 
ICD-9-CM: 401.9 Family history of premature CAD     ICD-10-CM: Z82.49 
ICD-9-CM: V17.3 Vitals BP Pulse Resp Height(growth percentile) Weight(growth percentile) SpO2  
 118/68 (BP 1 Location: Right arm, BP Patient Position: Sitting) 60 18 5' 4\" (1.626 m) 156 lb (70.8 kg) 96% BMI OB Status Smoking Status 26.78 kg/m2 Postmenopausal Never Smoker Vitals History BMI and BSA Data Body Mass Index Body Surface Area  
 26.78 kg/m 2 1.79 m 2 Preferred Pharmacy Pharmacy Name Phone [facility] [address] [place] [address] [phone] Your Updated Medication List  
  
   
This list is accurate as of 6/27/18  1:48 PM.  Always use your most recent med list.  
  
  
  
  
 BOSWELLIA SURENDRA XT (BULK) Take  by mouth daily. Anti-inflammatory supplement. Takes one cap po daily. coenzyme q10 300 mg Cap Take 300 mg by mouth daily. FLUoxetine 20 mg capsule Commonly known as:  PROzac TAKE 1 CAPSULE DAILY MULTIVITAMIN PO Take  by mouth daily. Takes one po daily. PROBIOTIC PO Take 1 Cap by mouth daily. raNITIdine 300 mg tablet Commonly known as:  ZANTAC TAKE 1 TABLET DAILY  
  
 simvastatin 40 mg tablet Commonly known as:  ZOCOR  
TAKE 1 TABLET NIGHTLY  
  
 SYNTHROID 100 mcg tablet Generic drug:  levothyroxine TAKE 1 TABLET DAILY BEFORE BREAKFAST Follow-up Instructions Return in about 1 year (around 6/27/2019). Introducing [facility] HE[facility]! Dear Emerson San: Thank you for requesting a [employer] account. Our records indicate that you already have an active [employer] account. You can access your account anytime at [email or web address] Did you know that you can access your hospital and ER discharge instructions at any time in Un[employer]? You can also review all of your test results from your hospital stay or ER visit. Additional Information If you have questions, please visit the Frequently Asked Questions section of the [employer] website at [email or web address] Remember, Un[employer] is NOT to be used for urgent needs. For medical emergencies, dial 911. Now available from your iPhone and Android! Please provide this summary of care documentation to your next provider. Your primary care clinician is listed as Chastity Milton. If you have any questions after today's visit, please call [phone].

## 2018-07-06 ENCOUNTER — OFFICE VISIT (OUTPATIENT)
Dept: INTERNAL MEDICINE CLINIC | Age: 68
End: 2018-07-06

## 2018-07-06 VITALS
DIASTOLIC BLOOD PRESSURE: 60 MMHG | BODY MASS INDEX: 26.67 KG/M2 | TEMPERATURE: 97.5 F | SYSTOLIC BLOOD PRESSURE: 134 MMHG | HEIGHT: 64 IN | OXYGEN SATURATION: 96 % | WEIGHT: 156.2 LBS | HEART RATE: 57 BPM | RESPIRATION RATE: 15 BRPM

## 2018-07-06 DIAGNOSIS — H69.82 DYSFUNCTION OF LEFT EUSTACHIAN TUBE: Primary | ICD-10-CM

## 2018-07-06 RX ORDER — OMEPRAZOLE 20 MG/1
CAPSULE, DELAYED RELEASE ORAL
COMMUNITY
Start: 2012-05-03 | End: 2018-07-06

## 2018-07-06 RX ORDER — ASPIRIN 81 MG/1
81 TABLET ORAL EVERY OTHER DAY
COMMUNITY
Start: 2012-05-03 | End: 2019-03-05

## 2018-07-06 RX ORDER — FLUTICASONE PROPIONATE 50 MCG
SPRAY, SUSPENSION (ML) NASAL
COMMUNITY
Start: 2018-06-11 | End: 2018-08-17

## 2018-07-06 RX ORDER — METHYLPREDNISOLONE 4 MG/1
TABLET ORAL
COMMUNITY
Start: 2018-05-14 | End: 2018-07-06

## 2018-07-06 NOTE — PROGRESS NOTES
Subjective: Hao De Los Santos is a 69 y.o. female who presents today for left ear pain    April had bad sinus infection  Has been using flonase daily since  No other daily allergy meds    Left ear has been hurting since for the past 2 days or so  No muffled hearing  Huts all the time, no worsening with touching  Has some drainage left throat, little sore throat as well  No coughing or dyspnea  No headaches or dizziness  No facial pain  No nausea or vomiting    Patient Active Problem List    Diagnosis Date Noted    Advance directive discussed with patient 03/13/2018    H/O bone density study 09/09/2016    Agatston CAC score 100-199 06/10/2016    Nuclear cataract 02/01/2016    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    Hyperlipidemia 01/10/2013    Colon cancer screening 07/10/2012    History of screening mammography 07/10/2012    Pap smear for cervical cancer screening 07/10/2012    Hypothyroid 12/07/2011    Depression 12/07/2011    HTN (hypertension) 12/07/2011    Fibrocystic breast 12/07/2011    S/p tibial fracture 12/07/2011    S/P hemorrhoidectomy 12/07/2011    Environmental allergies 12/07/2011    S/P laparoscopic cholecystectomy 12/07/2011    Hiatal hernia 12/07/2011     Current Outpatient Prescriptions   Medication Sig Dispense Refill    aspirin delayed-release 81 mg tablet Take  by mouth.  fluticasone (FLONASE) 50 mcg/actuation nasal spray       SYNTHROID 100 mcg tablet TAKE 1 TABLET DAILY BEFORE BREAKFAST 90 Tab 1    raNITIdine (ZANTAC) 300 mg tablet TAKE 1 TABLET DAILY (Patient taking differently: TAKE 1 TABLET  DAILY PRN) 90 Tab 1    simvastatin (ZOCOR) 40 mg tablet TAKE 1 TABLET NIGHTLY 90 Tab 1    FLUoxetine (PROZAC) 20 mg capsule TAKE 1 CAPSULE DAILY 90 Cap 1    coenzyme q10 300 mg cap Take 300 mg by mouth daily.  LACTOBACILLUS ACIDOPHILUS (PROBIOTIC PO) Take 1 Cap by mouth daily.  BOSWELLIA SURENDRA EXTRACT (BOSWELLIA SURENDRA XT, BULK,) Take  by mouth daily. Anti-inflammatory supplement. Takes one cap po daily.  MULTIVITAMIN PO Take  by mouth daily. Takes one po daily. Review of Systems    Pertinent items are noted in HPI. Objective:     Visit Vitals    /60 (BP 1 Location: Left arm, BP Patient Position: Sitting)    Pulse (!) 57    Temp 97.5 °F (36.4 °C) (Oral)    Resp 15    Ht 5' 4\" (1.626 m)    Wt 156 lb 3.2 oz (70.9 kg)    SpO2 96%    BMI 26.81 kg/m2     General appearance: alert, cooperative, no distress, appears stated age  Head: Normocephalic, without obvious abnormality, atraumatic  Eyes: conjunctivae/corneas clear  Ears: normal TM's and external ear canals AU  Throat: mild pharyngeal irritation and PND  Neck: supple, symmetrical, trachea midline, no adenopathy  Lungs: normal effort, no cough  Heart: regular rate  Extremities: extremities normal, steady gait  Skin: Skin color, texture, turgor normal  Lymph nodes: Cervical nodes normal.  Neurologic: Grossly normal    Assessment/Plan:     1. Dysfunction of left eustachian tube  - allergy management  - trial nasacort or rhinocort  - start daily zyrtec  - saline nasal spray      Advised her to call back or return to office if symptoms worsen/change/persist.  Discussed expected course/resolution/complications of diagnosis in detail with patient. Medication risks/benefits/costs/interactions/alternatives discussed with patient. She was given an after visit summary which includes diagnoses, current medications, & vitals. She expressed understanding with the diagnosis and plan.     BEBE Salas

## 2018-07-06 NOTE — PATIENT INSTRUCTIONS
Switch to Rhinocort or Nasacort    Saline nasal rises    Start daily zyrtec or claritin         Eustachian Tube Problems: Care Instructions  Your Care Instructions    The eustachian (say \"you-STAY-shee-un\") tubes run between the inside of the ears and the throat. They keep air pressure stable in the ears. If your eustachian tubes become blocked, the air pressure in your ears changes. The fluids from a cold can clog eustachian tubes, causing pain in the ears. A quick change in air pressure can cause eustachian tubes to close up. This might happen when an airplane changes altitude or when a  goes up or down underwater. Eustachian tube problems often clear up on their own or after antibiotic treatment. If your tubes continue to be blocked, you may need surgery. Follow-up care is a key part of your treatment and safety. Be sure to make and go to all appointments, and call your doctor if you are having problems. It's also a good idea to know your test results and keep a list of the medicines you take. How can you care for yourself at home? · To ease ear pain, apply a warm washcloth or a heating pad set on low. There may be some drainage from the ear when the heat melts earwax. Put a cloth between the heat source and your skin. Do not use a heating pad with children. · If your doctor prescribed antibiotics, take them as directed. Do not stop taking them just because you feel better. You need to take the full course of antibiotics. · Your doctor may recommend over-the-counter medicine. Be safe with medicines. Oral or nasal decongestants may relieve ear pain. Avoid decongestants that are combined with antihistamines, which tend to cause more blockage. But if allergies seem to be the problem, your doctor may recommend a combination. Be careful with cough and cold medicines. Don't give them to children younger than 6, because they don't work for children that age and can even be harmful.  For children 6 and older, always follow all the instructions carefully. Make sure you know how much medicine to give and how long to use it. And use the dosing device if one is included. When should you call for help? Call your doctor now or seek immediate medical care if:  ? · You develop sudden, complete hearing loss. ? · You have severe pain or feel dizzy. ? · You have new or increasing pus or blood draining from your ear. ? · You have redness, swelling, or pain around or behind the ear. ? Watch closely for changes in your health, and be sure to contact your doctor if:  ? · You do not get better after 2 weeks. ? · You have any new symptoms, such as itching or a feeling of fullness in the ear. Where can you learn more? Go to http://radha-nhi.info/. Enter Y822 in the search box to learn more about \"Eustachian Tube Problems: Care Instructions. \"  Current as of: May 12, 2017  Content Version: 11.4  © 7322-2019 [employer]. Care instructions adapted under license by [employer] (which disclaims liability or warranty for this information). If you have questions about a medical condition or this instruction, always ask your healthcare professional. [address] any warranty or liability for your use of this information.

## 2018-07-06 NOTE — MR AVS SNAPSHOT
[address] [facility], [address] 
[phone] Patient: Judson Guan MRN: [id number] :1950 Visit Information Date & Time Provider Department Dept. Phone Encounter #  
 2018 10:40 AM MANASA [facility] [phone] [id number] Follow-up Instructions Return if symptoms worsen or fail to improve. Your Appointments 2018 10:40 AM  
ROUTINE CARE with MD Julieta Grullon [facility] ([address]) Appt Note: 6 mths for hyperlipidemia, hypothyroid, depression. [address], [address] [place] [address]  
[address], [address] [place] [address]  
  
    
 2019  1:00 PM  
ESTABLISHED PATIENT with Juliet Zapien MD  
[facility] ([address]) Appt Note: 1 yr f/u  
 [address] [place]  
[address] [facility] [phone] Upcoming Health Maintenance Date Due  
 GLAUCOMA SCREENING Q2Y 2018 BREAST CANCER SCRN MAMMOGRAM 2018 Influenza Age 5 to Adult 2018 MEDICARE YEARLY EXAM 3/14/2019 COLONOSCOPY 2020 DTaP/Tdap/Td series (2 - Td) 10/16/2026 Allergies as of 2018  Review Complete On: 2018 By: Zeina Mckeon Severity Noted Reaction Type Reactions Adhesive Medium 2016    Hives Cephalexin  03/10/2017    Diarrhea  
 E-mycin [Erythromycin]  2011   Side Effect Other (comments) GI upset Sudafed [Pseudoephedrine Hcl]  2011   Side Effect Palpitations Current Immunizations  Reviewed on 3/10/2017 Name Date Influenza High Dose Vaccine PF 10/11/2017, 10/31/2015 Influenza Vaccine 10/11/2016, 10/1/2014, 2013 11:12 AM  
 Pneumococcal Conjugate (PCV-13) 2017 Pneumococcal Polysaccharide (PPSV-23) 3/18/2014 Pneumococcal Vaccine (Unspecified Type) 10/1/2014 Tdap 10/16/2016 Zoster 7/9/2011 Not reviewed this visit Vitals BP Pulse Temp Resp Height(growth percentile) Weight(growth percentile) 134/60 (BP 1 Location: Left arm, BP Patient Position: Sitting) (!) 57 97.5 °F (36.4 °C) (Oral) 15 5' 4\" (1.626 m) 156 lb 3.2 oz (70.9 kg) SpO2 BMI OB Status Smoking Status 96% 26.81 kg/m2 Postmenopausal Never Smoker Vitals History BMI and BSA Data Body Mass Index Body Surface Area  
 26.81 kg/m 2 1.79 m 2 Preferred Pharmacy Pharmacy Name Phone [facility] [address] [place] [address], [address] [place] [address] [phone] Your Updated Medication List  
  
   
This list is accurate as of 7/6/18 11:23 AM.  Always use your most recent med list.  
  
  
  
  
 aspirin delayed-release 81 mg tablet Take  by mouth. BOSWELLIA SURENDRA XT (BULK) Take  by mouth daily. Anti-inflammatory supplement. Takes one cap po daily. coenzyme q10 300 mg Cap Take 300 mg by mouth daily. FLUoxetine 20 mg capsule Commonly known as:  PROzac TAKE 1 CAPSULE DAILY  
  
 fluticasone 50 mcg/actuation nasal spray Commonly known as:  Vanessa Fryer MULTIVITAMIN PO Take  by mouth daily. Takes one po daily. PROBIOTIC PO Take 1 Cap by mouth daily. raNITIdine 300 mg tablet Commonly known as:  ZANTAC TAKE 1 TABLET DAILY  
  
 simvastatin 40 mg tablet Commonly known as:  ZOCOR  
TAKE 1 TABLET NIGHTLY  
  
 SYNTHROID 100 mcg tablet Generic drug:  levothyroxine TAKE 1 TABLET DAILY BEFORE BREAKFAST Follow-up Instructions Return if symptoms worsen or fail to improve. Patient Instructions Switch to Rhinocort or Nasacort Saline nasal rises Start daily zyrtec or claritin Eustachian Tube Problems: Care Instructions Your Care Instructions The eustachian (say \"you-STAY-shee-un\") tubes run between the inside of the ears and the throat. They keep air pressure stable in the ears. If your eustachian tubes become blocked, the air pressure in your ears changes. The fluids from a cold can clog eustachian tubes, causing pain in the ears. A quick change in air pressure can cause eustachian tubes to close up. This might happen when an airplane changes altitude or when a  goes up or down underwater. Eustachian tube problems often clear up on their own or after antibiotic treatment. If your tubes continue to be blocked, you may need surgery. Follow-up care is a key part of your treatment and safety. Be sure to make and go to all appointments, and call your doctor if you are having problems. It's also a good idea to know your test results and keep a list of the medicines you take. How can you care for yourself at home? · To ease ear pain, apply a warm washcloth or a heating pad set on low. There may be some drainage from the ear when the heat melts earwax. Put a cloth between the heat source and your skin. Do not use a heating pad with children. · If your doctor prescribed antibiotics, take them as directed. Do not stop taking them just because you feel better. You need to take the full course of antibiotics. · Your doctor may recommend over-the-counter medicine. Be safe with medicines. Oral or nasal decongestants may relieve ear pain. Avoid decongestants that are combined with antihistamines, which tend to cause more blockage. But if allergies seem to be the problem, your doctor may recommend a combination. Be careful with cough and cold medicines. Don't give them to children younger than 6, because they don't work for children that age and can even be harmful. For children 6 and older, always follow all the instructions carefully. Make sure you know how much medicine to give and how long to use it. And use the dosing device if one is included. When should you call for help? Call your doctor now or seek immediate medical care if: 
? · You develop sudden, complete hearing loss. ? · You have severe pain or feel dizzy. ? · You have new or increasing pus or blood draining from your ear. ? · You have redness, swelling, or pain around or behind the ear. ? Watch closely for changes in your health, and be sure to contact your doctor if: 
? · You do not get better after 2 weeks. ? · You have any new symptoms, such as itching or a feeling of fullness in the ear. Where can you learn more? Go to http://radha-nhi.info/. Enter Y822 in the search box to learn more about \"Eustachian Tube Problems: Care Instructions. \" Current as of: May 12, 2017 Content Version: 11.4 © 5340-4431 MediaLink. Care instructions adapted under license by [employer] (which disclaims liability or warranty for this information). If you have questions about a medical condition or this instruction, always ask your healthcare professional. [address] any warranty or liability for your use of this information. Introducing [facility] & HEALTH SERVICES! Dear Andria Montilla: Thank you for requesting a PriceMe account. Our records indicate that you already have an active PriceMe account. You can access your account anytime at [email or web address] Did you know that you can access your hospital and ER discharge instructions at any time in PriceMe? You can also review all of your test results from your hospital stay or ER visit. Additional Information If you have questions, please visit the Frequently Asked Questions section of the PriceMe website at [email or web address] Remember, PriceMe is NOT to be used for urgent needs. For medical emergencies, dial 911. Now available from your iPhone and Android! Please provide this summary of care documentation to your next provider. Your primary care clinician is listed as Chastity Milton. If you have any questions after today's visit, please call [phone].

## 2018-07-06 NOTE — PROGRESS NOTES
Niurka Guzman is a 69 y.o. female  Chief Complaint   Patient presents with    Ear Pain     L ear pain x2 days, states had sinus infection in April 2018, states had fluid in ears at last appt in April    Cold Symptoms     states feeling drainage at back of throat and under L ear, denies fever     Visit Vitals    /60 (BP 1 Location: Left arm, BP Patient Position: Sitting)    Pulse (!) 57    Temp 97.5 °F (36.4 °C) (Oral)    Resp 15    Ht 5' 4\" (1.626 m)    Wt 156 lb 3.2 oz (70.9 kg)    SpO2 96%    BMI 26.81 kg/m2     1. Have you been to the ER, urgent care clinic since your last visit? Hospitalized since your last visit? No    2. Have you seen or consulted any other health care providers outside of the [address] [place] [address] since your last visit? Include any pap smears or colon screening.  [facility], Dr. Marcella Doan, [facility], Colonoscopy  Health Maintenance Due   Topic Date Due    GLAUCOMA SCREENING Q2Y  01/01/2018    BREAST CANCER SCRN MAMMOGRAM   Appt scheduled for August 2018 06/14/2018

## 2018-08-15 ENCOUNTER — OFFICE VISIT (OUTPATIENT)
Dept: INTERNAL MEDICINE CLINIC | Age: 68
End: 2018-08-15

## 2018-08-15 VITALS
HEIGHT: 64 IN | TEMPERATURE: 97.6 F | DIASTOLIC BLOOD PRESSURE: 70 MMHG | SYSTOLIC BLOOD PRESSURE: 140 MMHG | HEART RATE: 52 BPM | OXYGEN SATURATION: 98 % | BODY MASS INDEX: 26.29 KG/M2 | WEIGHT: 154 LBS | RESPIRATION RATE: 16 BRPM

## 2018-08-15 DIAGNOSIS — B02.9 HERPES ZOSTER WITHOUT COMPLICATION: Primary | ICD-10-CM

## 2018-08-15 RX ORDER — VALACYCLOVIR HYDROCHLORIDE 1 G/1
1000 TABLET, FILM COATED ORAL 3 TIMES DAILY
Qty: 21 TAB | Refills: 0 | Status: SHIPPED | OUTPATIENT
Start: 2018-08-15 | End: 2018-08-17

## 2018-08-15 NOTE — MR AVS SNAPSHOT
[address] [facility], [address] 
[phone] Patient: Rhett Kaba MRN: [id number] :1950 Visit Information Date & Time Provider Department Dept. Phone Encounter #  
 8/15/2018  9:45 AM MD Julieta Whitfield [facility] [phone] [id number] Your Appointments 2018 10:40 AM  
ROUTINE CARE with MD Julieta Walls [facility] ([address]) Appt Note: 6 mths for hyperlipidemia, hypothyroid, depression. [address], [address] [facility] [address]  
[address], [address] [place] [address]  
  
    
 2019  1:00 PM  
ESTABLISHED PATIENT with Sharif Strong MD  
[facility] ([address]) Appt Note: 1 yr f/u  
 [address]  
[address] [facility] [phone] Upcoming Health Maintenance Date Due  
 GLAUCOMA SCREENING Q2Y 2018 BREAST CANCER SCRN MAMMOGRAM 2018 Influenza Age 5 to Adult 2018 MEDICARE YEARLY EXAM 3/14/2019 COLONOSCOPY 2020 DTaP/Tdap/Td series (2 - Td) 10/16/2026 Allergies as of 8/15/2018  Review Complete On: 8/15/2018 By: Sam Acosta MD  
  
 Severity Noted Reaction Type Reactions Adhesive Medium 2016    Hives Cephalexin  03/10/2017    Diarrhea  
 E-mycin [Erythromycin]  2011   Side Effect Other (comments) GI upset Sudafed [Pseudoephedrine Hcl]  2011   Side Effect Palpitations Current Immunizations  Reviewed on 3/10/2017 Name Date Influenza High Dose Vaccine PF 10/11/2017, 10/31/2015 Influenza Vaccine 10/11/2016, 10/1/2014, 2013 11:12 AM  
 Pneumococcal Conjugate (PCV-13) 2017 Pneumococcal Polysaccharide (PPSV-23) 3/18/2014 Pneumococcal Vaccine (Unspecified Type) 10/1/2014 Tdap 10/16/2016 Zoster 7/9/2011 Not reviewed this visit You Were Diagnosed With   
  
 Codes Comments Herpes zoster without complication    -  Primary ICD-10-CM: B02.9 ICD-9-CM: 063. 9 Vitals BP Pulse Temp Resp Height(growth percentile) Weight(growth percentile) 140/70 (BP 1 Location: Left arm, BP Patient Position: Sitting) (!) 52 97.6 °F (36.4 °C) (Oral) 16 5' 4\" (1.626 m) 154 lb (69.9 kg) SpO2 BMI OB Status Smoking Status 98% 26.43 kg/m2 Postmenopausal Never Smoker Vitals History BMI and BSA Data Body Mass Index Body Surface Area  
 26.43 kg/m 2 1.78 m 2 Preferred Pharmacy Pharmacy Name Phone [facility] [address] [place] [address], [address] [place] [address] [phone] Your Updated Medication List  
  
   
This list is accurate as of 8/15/18 10:02 AM.  Always use your most recent med list.  
  
  
  
  
 aspirin delayed-release 81 mg tablet Take  by mouth. BOSWELLIA SURENDRA XT (BULK) Take  by mouth daily. Anti-inflammatory supplement. Takes one cap po daily. coenzyme q10 300 mg Cap Take 300 mg by mouth daily. FLUoxetine 20 mg capsule Commonly known as:  PROzac TAKE 1 CAPSULE DAILY  
  
 fluticasone 50 mcg/actuation nasal spray Commonly known as:  Zaid Cyphers MULTIVITAMIN PO Take  by mouth daily. Takes one po daily. PROBIOTIC PO Take 1 Cap by mouth daily. raNITIdine 300 mg tablet Commonly known as:  ZANTAC TAKE 1 TABLET DAILY  
  
 simvastatin 40 mg tablet Commonly known as:  ZOCOR  
TAKE 1 TABLET NIGHTLY  
  
 SYNTHROID 100 mcg tablet Generic drug:  levothyroxine TAKE 1 TABLET DAILY BEFORE BREAKFAST  
  
 valACYclovir 1 gram tablet Commonly known as:  VALTREX Take 1 Tab by mouth three (3) times daily. Indications: herpes zoster Prescriptions Sent to Pharmacy  Refills  
 valACYclovir (VALTREX) 1 gram tablet 0  
 Sig: Take 1 Tab by mouth three (3) times daily. Indications: herpes zoster Class: Normal  
 Pharmacy: [facility] [address], [address] [facility] [address] [facility] #: [phone] Route: Oral  
  
Patient Instructions Alternate Tylenol ES with Motrin every four hours for pain. Take Valtrex 1 gm every 8 hours for one week. Introducing [facility] & HEALTH SERVICES! Dear Simone Watson: Thank you for requesting a Layer3 TV account. Our records indicate that you already have an active Layer3 TV account. You can access your account anytime at [email or web address] Did you know that you can access your hospital and ER discharge instructions at any time in Layer3 TV? You can also review all of your test results from your hospital stay or ER visit. Additional Information If you have questions, please visit the Frequently Asked Questions section of the Layer3 TV website at [email or web address] Remember, Layer3 TV is NOT to be used for urgent needs. For medical emergencies, dial 911. Now available from your iPhone and Android! Please provide this summary of care documentation to your next provider. Your primary care clinician is listed as Chastity Milton. If you have any questions after today's visit, please call [phone].

## 2018-08-15 NOTE — PROGRESS NOTES
Subjective:     Chief Complaint   Patient presents with    Rash     left side. dacosta pain. itchy     She  is a 69y.o. year old female who presents for evaluation. Had shingles in 2011 and then got the shingles vaccine. Felt sharp pain in left flank region several days ago and now has persistent pain and ? Rash starting. Historical Data    Past Medical History:   Diagnosis Date    Arthritis     lower back    Cancer ([address]     BCCAs removed - 6 on legs    Depression     Environmental allergies 12/7/2011    Fibrocystic breast 12/7/2011    GERD (gastroesophageal reflux disease)     Hiatal hernia 12/7/2011    HIGH BLOOD PRESSURE     not on meds    HTN (hypertension) 12/7/2011    Hypothyroid 12/7/2011    Ill-defined condition     prediabetic    Nausea & vomiting     S/P hemorrhoidectomy 12/7/2011    S/P laparoscopic cholecystectomy 12/7/2011    S/p tibial fracture 12/7/2011    Sleep apnea     CPAP not used/was mild and pt states not a problem now with wt loss    Tendonitis of wrist, left 12/2017    cortisone shot    Thyroid disorder         Past Surgical History:   Procedure Laterality Date    ENDOSCOPY, COLON, DIAGNOSTIC  11/2010    ([facility])-f/u 5 yrs.  HX BREAST LUMPECTOMY      right - benign    HX CHOLECYSTECTOMY      HX GI      hemorrhoidectomy    HX ORTHOPAEDIC      surgery for tibial plateau fx - took bone from hip/has hardware knee to ankle        Outpatient Encounter Prescriptions as of 8/15/2018   Medication Sig Dispense Refill    aspirin delayed-release 81 mg tablet Take  by mouth.  SYNTHROID 100 mcg tablet TAKE 1 TABLET DAILY BEFORE BREAKFAST 90 Tab 1    simvastatin (ZOCOR) 40 mg tablet TAKE 1 TABLET NIGHTLY 90 Tab 1    FLUoxetine (PROZAC) 20 mg capsule TAKE 1 CAPSULE DAILY 90 Cap 1    coenzyme q10 300 mg cap Take 300 mg by mouth daily.  LACTOBACILLUS ACIDOPHILUS (PROBIOTIC PO) Take 1 Cap by mouth daily.       BOSWELLIA SURENDRA EXTRACT (BOSWELLIA SURENDRA XT, BULK,) Take  by mouth daily. Anti-inflammatory supplement. Takes one cap po daily.  MULTIVITAMIN PO Take  by mouth daily. Takes one po daily.  fluticasone (FLONASE) 50 mcg/actuation nasal spray       raNITIdine (ZANTAC) 300 mg tablet TAKE 1 TABLET DAILY (Patient taking differently: TAKE 1 TABLET  DAILY PRN) 90 Tab 1     No facility-administered encounter medications on file as of 8/15/2018. Allergies   Allergen Reactions    Adhesive Hives    Cephalexin Diarrhea    E-Mycin [Erythromycin] Other (comments)     GI upset    Sudafed [Pseudoephedrine Hcl] Palpitations        Social History     Social History    Marital status:      Spouse name: N/A    Number of children: N/A    Years of education: N/A     Occupational History    Not on file. Social History Main Topics    Smoking status: Never Smoker    Smokeless tobacco: Never Used    Alcohol use 4.2 oz/week     7 Glasses of wine per week    Drug use: No    Sexual activity: No      Comment: partner has decided     Other Topics Concern    Not on file     Social History Narrative        Review of Systems  Pertinent items are noted in HPI. Objective:     Vitals:    08/15/18 0949   BP: 140/70   Pulse: (!) 52   Resp: 16   Temp: 97.6 °F (36.4 °C)   TempSrc: Oral   SpO2: 98%   Weight: 154 lb (69.9 kg)   Height: 5' 4\" (1.626 m)     Pleasant WF. No obvious rash noted in area of pain but could still be in prodrome phase. ASSESSMENT / PLAN:   1. Herpes zoster without complication  · Based on description of pain and previous experience with zoster will go ahead and treat presumptively. · Candidate for new Shingrix vaccine (Discuss with Dr Deborah Deleon)  - valACYclovir (VALTREX) 1 gram tablet; Take 1 Tab by mouth three (3) times daily. Indications: herpes zoster  Dispense: 21 Tab; Refill: 0    Patient Instructions   Alternate Tylenol ES with Motrin every four hours for pain. Take Valtrex 1 gm every 8 hours for one week.            Follow-up Disposition: Not on File   Advised her to call back or return to office if symptoms worsen/change/persist.  Discussed expected course/resolution/complications of diagnosis in detail with patient. Medication risks/benefits/costs/interactions/alternatives discussed with patient. She was given an after visit summary which includes diagnoses, current medications, & vitals. She expressed understanding with the diagnosis and plan.

## 2018-08-15 NOTE — PATIENT INSTRUCTIONS
Alternate Tylenol ES with Motrin every four hours for pain. Take Valtrex 1 gm every 8 hours for one week.

## 2018-08-15 NOTE — PROGRESS NOTES
Chief Complaint   Patient presents with    Rash     left side. dacosta pain. itchy     Reviewed record in preparation for visit and have obtained necessary documentation. Identified pt with two pt identifiers(name and ). Health Maintenance Due   Topic    GLAUCOMA SCREENING Q2Y     BREAST CANCER SCRN MAMMOGRAM     Influenza Age 5 to Adult          Chief Complaint   Patient presents with    Rash     left side. dacosta pain.  itchy        Wt Readings from Last 3 Encounters:   08/15/18 154 lb (69.9 kg)   18 156 lb 3.2 oz (70.9 kg)   18 156 lb (70.8 kg)     Temp Readings from Last 3 Encounters:   08/15/18 97.6 °F (36.4 °C) (Oral)   18 97.5 °F (36.4 °C) (Oral)   18 97.9 °F (36.6 °C) (Oral)     BP Readings from Last 3 Encounters:   08/15/18 140/70   18 134/60   18 118/68     Pulse Readings from Last 3 Encounters:   08/15/18 (!) 52   18 (!) 57   18 60           Learning Assessment:  :     Learning Assessment 3/13/2018 2015 2014 2014 1/10/2013   PRIMARY LEARNER Patient Patient Patient Patient Patient   HIGHEST LEVEL OF EDUCATION - PRIMARY LEARNER  GRADUATED HIGH SCHOOL OR GED GRADUATED HIGH SCHOOL OR GED GRADUATED HIGH SCHOOL OR GED GRADUATED HIGH SCHOOL OR GED -   BARRIERS PRIMARY LEARNER NONE NONE NONE NONE -   CO-LEARNER CAREGIVER No No No No -   PRIMARY LANGUAGE ENGLISH ENGLISH ENGLISH ENGLISH ENGLISH    NEED - No - - -   LEARNER PREFERENCE PRIMARY DEMONSTRATION LISTENING LISTENING DEMONSTRATION DEMONSTRATION     - DEMONSTRATION DEMONSTRATION - -   LEARNING SPECIAL TOPICS - no - - -   ANSWERED BY patient patient Harleen Castellon patient patient   RELATIONSHIP SELF SELF SELF SELF SELF       Depression Screening:  :     PHQ over the last two weeks 2018   PHQ Not Done -   Little interest or pleasure in doing things Not at all   Feeling down, depressed, irritable, or hopeless Nearly every day   Total Score PHQ 2 3       Fall Risk Assessment:  :     Fall Risk Assessment, last 12 mths 8/15/2018   Able to walk? Yes   Fall in past 12 months? No       Abuse Screening:  :     Abuse Screening Questionnaire 7/6/2018 9/11/2017 3/16/2015 3/18/2014   Do you ever feel afraid of your partner? N N N N   Are you in a relationship with someone who physically or mentally threatens you? N N N N   Is it safe for you to go home? Y Y Y Y       Coordination of Care Questionnaire:  :     1) Have you been to an emergency room, urgent care clinic since your last visit? no   Hospitalized since your last visit? no             2) Have you seen or consulted any other health care providers outside of [facility] since your last visit? no  (Include any pap smears or colon screenings in this section.)    3) Do you have an Advance Directive on file? no    4) Are you interested in receiving information on Advance Directives? NO      Patient is accompanied by self I have received verbal consent from Justin Livingston to discuss any/all medical information while they are present in the room. Reviewed record  In preparation for visit and have obtained necessary documentation.

## 2018-08-17 ENCOUNTER — OFFICE VISIT (OUTPATIENT)
Dept: INTERNAL MEDICINE CLINIC | Age: 68
End: 2018-08-17

## 2018-08-17 VITALS
HEIGHT: 64 IN | OXYGEN SATURATION: 97 % | TEMPERATURE: 97.6 F | SYSTOLIC BLOOD PRESSURE: 118 MMHG | BODY MASS INDEX: 26.46 KG/M2 | WEIGHT: 155 LBS | HEART RATE: 59 BPM | RESPIRATION RATE: 14 BRPM | DIASTOLIC BLOOD PRESSURE: 64 MMHG

## 2018-08-17 DIAGNOSIS — B02.9 HERPES ZOSTER WITHOUT COMPLICATION: Primary | ICD-10-CM

## 2018-08-17 RX ORDER — UREA 40 %
CREAM (GRAM) TOPICAL
COMMUNITY
Start: 2018-07-25 | End: 2018-11-02

## 2018-08-17 NOTE — PROGRESS NOTES
Patient's identity verified with two patient identifiers (name and date of birth). Reviewed record in preparation for visit and have obtained necessary documentation. 1. Have you been to the ER, urgent care clinic since your last visit? Hospitalized since your last visit? No  2. Have you seen or consulted any other health care providers outside of the [facility] since your last visit? Include any pap smears or colon screening. No    Chief Complaint   Patient presents with    Back Pain     Seen by Dr. Catalina Yarbrough 8/15/18, same sx but \"bigger area\". Pain Left mid/upper back, starts midline then radiates to Left breast, constant. Given Rx for shingles, stopped since no relief. Denies rash/itching. Aspirin q4h, some relief. Not fasting.     Health Maintenance Due   Topic Date Due    GLAUCOMA SCREENING Q2Y  01/01/2018    BREAST CANCER SCRN MAMMOGRAM  06/14/2018       Wt Readings from Last 3 Encounters:   08/17/18 155 lb (70.3 kg)   08/15/18 154 lb (69.9 kg)   07/06/18 156 lb 3.2 oz (70.9 kg)     Temp Readings from Last 3 Encounters:   08/17/18 97.6 °F (36.4 °C) (Oral)   08/15/18 97.6 °F (36.4 °C) (Oral)   07/06/18 97.5 °F (36.4 °C) (Oral)     BP Readings from Last 3 Encounters:   08/17/18 118/64   08/15/18 140/70   07/06/18 134/60     Pulse Readings from Last 3 Encounters:   08/17/18 (!) 59   08/15/18 (!) 52   07/06/18 (!) 57

## 2018-08-17 NOTE — PROGRESS NOTES
Subjective: Judson Guan is a 69 y.o. female who presents today with persisitent pain in her left flank. She saw Dr. Jesus Pickering 2 days ago. The description of her pain was very much like her incidence of shingles in that area years ago. Dr. Jesus Pickering started valtrex, but she states that it is no different in pain in the last 2 days. No blister formation yet. Stabbing/nagging pain on her left flank that radiates to the front. No recent trauma. 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 Apply to heels      aspirin delayed-release 81 mg tablet Take  by mouth.  SYNTHROID 100 mcg tablet TAKE 1 TABLET DAILY BEFORE BREAKFAST 90 Tab 1    raNITIdine (ZANTAC) 300 mg tablet TAKE 1 TABLET DAILY (Patient taking differently: TAKE 1 TABLET  DAILY PRN) 90 Tab 1    simvastatin (ZOCOR) 40 mg tablet TAKE 1 TABLET NIGHTLY 90 Tab 1    FLUoxetine (PROZAC) 20 mg capsule TAKE 1 CAPSULE DAILY 90 Cap 1    coenzyme q10 300 mg cap Take 300 mg by mouth daily.  LACTOBACILLUS ACIDOPHILUS (PROBIOTIC PO) Take 1 Cap by mouth daily.  BOSWELLIA SURENDRA EXTRACT (BOSWELLIA SURENDRA XT, BULK,) Take  by mouth daily. Anti-inflammatory supplement. Takes one cap po daily.       MULTIVITAMIN PO Take  by mouth daily. Takes one po daily. Review of Systems    Pertinent items are noted in HPI. Objective:     Visit Vitals    /64 (BP 1 Location: Left arm, BP Patient Position: Sitting)    Pulse (!) 59    Temp 97.6 °F (36.4 °C) (Oral)    Resp 14    Ht 5' 4\" (1.626 m)    Wt 155 lb (70.3 kg)    SpO2 97%    BMI 26.61 kg/m2     General appearance: alert, cooperative, no distress, appears stated age  Head: Normocephalic, without obvious abnormality, atraumatic  Back: tender along left T8 dermatome. Slight erythema, but no distinct rash noted. Assessment/Plan:     1. Herpes zoster without complication  -encouraged patient to complete her course of valtrex. Symptoms and description of her pain is consistent with possible shingle. Discussed with patient that the neuropathy with shingles can linger even in those who develop a rash and the purpose of the valtrex is to shorten the course. Patient stated understanding. She can use warm or cool compresses to help decrease discomfort as well as otc tylenol or nsaids    Follow-up Disposition:     Return if symptoms worsen or fail to improve. Advised patient to call back or return to office if symptoms worsen/change/persist.     Discussed expected course/resolution/complications of diagnosis in detail with patient. Medication risks/benefits/costs/interactions/alternatives discussed with patient. Patient was given an after visit summary which includes diagnoses, current medications, & vitals. Patient expressed understanding with the diagnosis and plan.

## 2018-09-01 RX ORDER — FLUOXETINE HYDROCHLORIDE 20 MG/1
CAPSULE ORAL
Qty: 90 CAP | Refills: 1 | Status: SHIPPED | OUTPATIENT
Start: 2018-09-01 | End: 2019-02-28 | Stop reason: SDUPTHER

## 2018-09-07 RX ORDER — RANITIDINE 300 MG/1
TABLET ORAL
Qty: 90 TAB | Refills: 0 | Status: SHIPPED | OUTPATIENT
Start: 2018-09-07 | End: 2018-12-08 | Stop reason: SDUPTHER

## 2018-09-07 RX ORDER — SIMVASTATIN 40 MG/1
TABLET, FILM COATED ORAL
Qty: 90 TAB | Refills: 0 | Status: SHIPPED | OUTPATIENT
Start: 2018-09-07 | End: 2018-12-06 | Stop reason: SDUPTHER

## 2018-09-07 NOTE — TELEPHONE ENCOUNTER
Last refill- 03.12.18    Last office visit- 03.13.18  Next office visit/ Future Appointments  Date Time Provider Hannah Jessica   9/14/2018 10:40 AM MD VANESSA Grey   6/26/2019 1:00 PM Jamari Del Toro  [address]         Requested Prescriptions     Pending Prescriptions Disp Refills    simvastatin (ZOCOR) 40 mg tablet [Pharmacy Med Name: SIMVASTATIN TABS 40MG] 90 Tab 1     Sig: TAKE 1 TABLET NIGHTLY    raNITIdine (ZANTAC) 300 mg tablet [Pharmacy Med Name: RANITIDINE TABS 300MG] 90 Tab 1     Sig: TAKE 1 TABLET DAILY

## 2018-09-18 ENCOUNTER — HOSPITAL ENCOUNTER (OUTPATIENT)
Dept: CT IMAGING | Age: 68
Discharge: HOME | End: 2018-09-18
Attending: SPECIALIST
Payer: MEDICARE

## 2018-09-18 DIAGNOSIS — R22.1 NECK MASS: ICD-10-CM

## 2018-09-18 DIAGNOSIS — H92.02 LEFT EAR PAIN: ICD-10-CM

## 2018-09-18 LAB — CREAT BLD-MCNC: 0.7 MG/DL (ref 0.6–1.3)

## 2018-09-18 PROCEDURE — 74011636320 HC RX REV CODE- 636/320: Performed by: SPECIALIST

## 2018-09-18 PROCEDURE — 70491 CT SOFT TISSUE NECK W/DYE: CPT

## 2018-09-18 PROCEDURE — 82565 ASSAY OF CREATININE: CPT

## 2018-09-18 PROCEDURE — 74011000258 HC RX REV CODE- 258: Performed by: SPECIALIST

## 2018-09-18 RX ORDER — SODIUM CHLORIDE 0.9 % (FLUSH) 0.9 %
10 SYRINGE (ML) INJECTION
Status: COMPLETED | OUTPATIENT
Start: 2018-09-18 | End: 2018-09-18

## 2018-09-18 RX ADMIN — Medication 10 ML: at 14:13

## 2018-09-18 RX ADMIN — SODIUM CHLORIDE 100 ML: 900 INJECTION, SOLUTION INTRAVENOUS at 14:13

## 2018-09-18 RX ADMIN — IOPAMIDOL 100 ML: 612 INJECTION, SOLUTION INTRAVENOUS at 14:12

## 2018-10-08 ENCOUNTER — HOSPITAL ENCOUNTER (OUTPATIENT)
Dept: ULTRASOUND IMAGING | Age: 68
Discharge: HOME | End: 2018-10-08
Attending: OTOLARYNGOLOGY
Payer: MEDICARE

## 2018-10-08 DIAGNOSIS — Z78.9 DIETING: ICD-10-CM

## 2018-10-08 DIAGNOSIS — R68.2 DRY MOUTH: ICD-10-CM

## 2018-10-08 DIAGNOSIS — R22.1 NECK MASS: ICD-10-CM

## 2018-10-08 DIAGNOSIS — K11.20 SIALOADENITIS: ICD-10-CM

## 2018-10-08 DIAGNOSIS — Z00.8 HEALTH EXAMINATION IN POPULATION SURVEY: ICD-10-CM

## 2018-10-08 PROCEDURE — 74011250636 HC RX REV CODE- 250/636: Performed by: RADIOLOGY

## 2018-10-08 PROCEDURE — 88172 CYTP DX EVAL FNA 1ST EA SITE: CPT | Performed by: OTOLARYNGOLOGY

## 2018-10-08 PROCEDURE — 10022 US GUIDE FINE NDL ASP W IMAGE: CPT

## 2018-10-08 PROCEDURE — 88173 CYTOPATH EVAL FNA REPORT: CPT | Performed by: OTOLARYNGOLOGY

## 2018-10-08 PROCEDURE — 88305 TISSUE EXAM BY PATHOLOGIST: CPT | Performed by: OTOLARYNGOLOGY

## 2018-10-08 PROCEDURE — 77030003503 HC NDL BIOP TISS BD -B

## 2018-10-08 RX ORDER — LIDOCAINE HYDROCHLORIDE 10 MG/ML
5 INJECTION, SOLUTION EPIDURAL; INFILTRATION; INTRACAUDAL; PERINEURAL
Status: COMPLETED | OUTPATIENT
Start: 2018-10-08 | End: 2018-10-08

## 2018-10-08 RX ADMIN — LIDOCAINE HYDROCHLORIDE 5 ML: 10 INJECTION, SOLUTION EPIDURAL; INFILTRATION; INTRACAUDAL; PERINEURAL at 13:40

## 2018-10-08 NOTE — DISCHARGE INSTRUCTIONS
[facility]  Department of Radiology  [phone] Ultrasound Department  [phone] Emergency Department    BIOPSY DISCHARGE INSTRUCTIONS for Dashawn Pisano on October 8, 2018    General Instructions:  - A biopsy is the removal of a small piece of tissue for microscopic examination or testing.  - Healthy tissue can be obtained for the purpose of tissue-type matching for transplants. - Unhealthy tissues are more commonly biopsied to diagnose disease. General Biopsy:  - A mass can grow in any area of the body, and we are taking a specimen as ordered by your doctor. - The risks are the same. They include bleeding, pain, and infection. Home Care Instructions:  - You may resume your regular diet and medication regimen. - You may use over the counter acetaminophen (Tylenol) or ibuprofen (Advil) for the soreness. - You may apply an ice pack to the affected area for 20-30 minutes at time for the first 24 hours. -- After that, you may apply a heat pack. - You may remove the bandaid(s) tonight. - You may take a shower tonight. - Please keep the site clean and dry for 24-48 hours. - Do not soak in any kind of water (bath tub, hot tub, pool, ocean, etc) for 24-48 hours. - Do not participate in any kind of activity making you vigorously sweat for 24-48 hours. - Do not use any moisturizer/makeup/perfume on the site for 24-48 hours. Call If:  - You should call Dr. Heriberto Ball if you have any questions or concerns about the biopsy site. - Call if you should have increased pain, fever, redness, drainage, or bleeding more than a small spot on the bandage. Follow-Up Instructions:  - Please see Dr. Heriberto Ball as he has requested.

## 2018-11-02 ENCOUNTER — HOSPITAL ENCOUNTER (OUTPATIENT)
Dept: LAB | Age: 68
Discharge: HOME | End: 2018-11-02
Payer: MEDICARE

## 2018-11-02 ENCOUNTER — OFFICE VISIT (OUTPATIENT)
Dept: INTERNAL MEDICINE CLINIC | Age: 68
End: 2018-11-02

## 2018-11-02 VITALS
BODY MASS INDEX: 26.8 KG/M2 | HEIGHT: 64 IN | DIASTOLIC BLOOD PRESSURE: 62 MMHG | HEART RATE: 56 BPM | WEIGHT: 157 LBS | RESPIRATION RATE: 14 BRPM | SYSTOLIC BLOOD PRESSURE: 104 MMHG | TEMPERATURE: 98.5 F | OXYGEN SATURATION: 98 %

## 2018-11-02 DIAGNOSIS — F32.A DEPRESSION, UNSPECIFIED DEPRESSION TYPE: ICD-10-CM

## 2018-11-02 DIAGNOSIS — Z79.899 ENCOUNTER FOR LONG-TERM (CURRENT) USE OF MEDICATIONS: ICD-10-CM

## 2018-11-02 DIAGNOSIS — K21.9 GASTROESOPHAGEAL REFLUX DISEASE WITHOUT ESOPHAGITIS: ICD-10-CM

## 2018-11-02 DIAGNOSIS — E78.2 MIXED HYPERLIPIDEMIA: ICD-10-CM

## 2018-11-02 DIAGNOSIS — E03.9 HYPOTHYROIDISM, ADULT: ICD-10-CM

## 2018-11-02 DIAGNOSIS — Z23 ENCOUNTER FOR IMMUNIZATION: Primary | ICD-10-CM

## 2018-11-02 PROCEDURE — 81001 URINALYSIS AUTO W/SCOPE: CPT

## 2018-11-02 PROCEDURE — 87088 URINE BACTERIA CULTURE: CPT

## 2018-11-02 PROCEDURE — 80053 COMPREHEN METABOLIC PANEL: CPT

## 2018-11-02 PROCEDURE — 85025 COMPLETE CBC W/AUTO DIFF WBC: CPT

## 2018-11-02 PROCEDURE — 87077 CULTURE AEROBIC IDENTIFY: CPT

## 2018-11-02 PROCEDURE — 87086 URINE CULTURE/COLONY COUNT: CPT

## 2018-11-02 PROCEDURE — 84439 ASSAY OF FREE THYROXINE: CPT

## 2018-11-02 PROCEDURE — 84443 ASSAY THYROID STIM HORMONE: CPT

## 2018-11-02 PROCEDURE — 36415 COLL VENOUS BLD VENIPUNCTURE: CPT

## 2018-11-02 PROCEDURE — 80061 LIPID PANEL: CPT

## 2018-11-02 RX ORDER — VALACYCLOVIR HYDROCHLORIDE 1 G/1
TABLET, FILM COATED ORAL
Refills: 0 | COMMUNITY
Start: 2018-08-15 | End: 2019-01-22 | Stop reason: ALTCHOICE

## 2018-11-02 RX ORDER — LACTOBACILLUS ACIDOPHILUS
1 POWDER (GRAM) MISCELLANEOUS
COMMUNITY
End: 2018-11-02

## 2018-11-02 NOTE — PROGRESS NOTES
Subjective: Josey Bryan is a 77 y.o. female who presents today for follow up of her hyperlipidemia, depression, and hypothyroid. Seeing Dr. Antonio Streeter for  swelling in jaw. Per not from Dr. Antonio Streeter, [address] indicates that this may be sialinditis  She has follow up with him in January, 2019. She has noted that she has had a dry cough in the last few weeks. She has been taking her 24 hour antihistamine and using a steroid spray daily for her allergies. She has been also vacationing in [facility] for the last 15 days and has eaten \"everything that I know I am not suppose to. \"  She is not using her zantac. She denies any chest pain, shortness of breath, syncope, headaches, nausea/vomiting, or any bowel changes. 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  Repeat in 2-6 months 0.5 mL 1    simvastatin (ZOCOR) 40 mg tablet TAKE 1 TABLET NIGHTLY 90 Tab 0    raNITIdine (ZANTAC) 300 mg tablet TAKE 1 TABLET DAILY (Patient taking differently: TAKE 1 TABLET DAILY PRN) 90 Tab 0    FLUoxetine (PROZAC) 20 mg capsule TAKE 1 CAPSULE DAILY 90 Cap 1    aspirin delayed-release 81 mg tablet Take  by mouth.  SYNTHROID 100 mcg tablet TAKE 1 TABLET DAILY BEFORE BREAKFAST 90 Tab 1    coenzyme q10 300 mg cap Take 300 mg by mouth daily.  LACTOBACILLUS ACIDOPHILUS (PROBIOTIC PO) Take 1 Cap by mouth daily.  BOSWELLIA SURENDRA EXTRACT (BOSWELLIA SURENDRA XT, BULK,) Take  by mouth daily. Anti-inflammatory supplement. Takes one cap po daily.  MULTIVITAMIN PO Take  by mouth daily. Takes one po daily. Review of Systems    Pertinent items are noted in HPI. Objective:     Visit Vitals  /62 (BP 1 Location: Left arm, BP Patient Position: Sitting)   Pulse (!) 56   Temp 98.5 °F (36.9 °C) (Oral)   Resp 14   Ht 5' 4\" (1.626 m)   Wt 157 lb (71.2 kg)   SpO2 98%   BMI 26.95 kg/m²     General appearance: alert, cooperative, no distress, appears stated age  Head: Normocephalic, without obvious abnormality, atraumatic  Ears: normal TM's and external ear canals AU  Throat: Lips, mucosa, and tongue normal. Teeth and gums normal and abnormal findings: mild oropharyngeal erythema  Neck: supple, symmetrical, trachea midline, no adenopathy, thyroid: not enlarged, symmetric, no tenderness/mass/nodules, no carotid bruit and no JVD  Lungs: clear to auscultation bilaterally  Heart: regular rate and rhythm, S1, S2 normal, no murmur, click, rub or gallop  Abdomen: soft, non-tender. Bowel sounds normal. No masses,  no organomegaly  Extremities: extremities normal, atraumatic, no cyanosis or edema  Pulses: 2+ and symmetric    Assessment/Plan:     1. Encounter for immunization  -received her flu vaccine today without any complications.     - ADMIN INFLUENZA VIRUS VAC  - INFLUENZA VACCINE INACTIVATED (IIV), SUBUNIT, ADJUVANTED, IM    2. Depression, unspecified depression type  -I evaluated and recommended to continue current doses of medications. - CBC WITH AUTOMATED DIFF    3.  Mixed hyperlipidemia  -has not been following a low fat diet in the last couple of weeks while on vacation.   -check fasting lipid panel at this time. - CBC WITH AUTOMATED DIFF  - LIPID PANEL  - METABOLIC PANEL, COMPREHENSIVE    4. Hypothyroidism, adult  -clinically euthyroid. - TSH 3RD GENERATION  - T4, FREE    5. Encounter for long-term (current) use of medications    - CBC WITH AUTOMATED DIFF  - LIPID PANEL  - METABOLIC PANEL, COMPREHENSIVE  - TSH 3RD GENERATION  - T4, FREE  - UA/M W/RFLX CULTURE, ROUTINE    6. Gastroesophageal reflux disease without esophagitis  -recommended restarting zantac 75mg bid for next couple of weeks.  -watch diet. 7.  Health Care Maintenance    -  Orders Placed This Encounter    Influenza Vaccine Inactivated (IIV)(FLUAD), Subunit, Adjuvanted, IM, (63931)    CBC WITH AUTOMATED DIFF    LIPID PANEL    METABOLIC PANEL, COMPREHENSIVE    TSH 3RD GENERATION    T4, FREE    UA/M W/RFLX CULTURE, ROUTINE    Administration fee () for Medicare insured patients                           varicella-zoster recombinant, PF, (SHINGRIX) 50 mcg/0.5 mL susr injection     Si.5 mL by IntraMUSCular route once for 1 dose. Repeat in 2-6 months     Dispense:  0.5 mL     Refill:  1         Follow-up Disposition:     Follow up in 6 months     Return if symptoms worsen or fail to improve. Advised patient to call back or return to office if symptoms worsen/change/persist.     Discussed expected course/resolution/complications of diagnosis in detail with patient. Medication risks/benefits/costs/interactions/alternatives discussed with patient. Patient was given an after visit summary which includes diagnoses, current medications, & vitals. Patient expressed understanding with the diagnosis and plan.

## 2018-11-02 NOTE — PROGRESS NOTES
Martin Keating is a 77 y.o. female who presents for yearly flu immunization per verbal order from Daja Ross MD.  She denies any symptoms , reactions or allergies that would exclude them from being immunized today. Risks and adverse reactions were discussed and the VIS was given to her. All questions were addressed. She was observed for 10 min post injection. There were no reactions observed.

## 2018-11-02 NOTE — PROGRESS NOTES
Patient's identity verified with two patient identifiers (name and date of birth). Reviewed record in preparation for visit and have obtained necessary documentation. 1. Have you been to the ER, urgent care clinic since your last visit? Hospitalized since your last visit? No  2. Have you seen or consulted any other health care providers outside of the [address] [place] [address] since your last visit? Include any pap smears or colon screening. Yes, seeing Dr. aYdi Ruth at [facility], Lymph node swelling on left jaw. Chief Complaint   Patient presents with    Cholesterol Problem     6 month follow up    Depression     6 month follow up    Thyroid Problem     6 month follow up    Cough     Dry cough, x3-4wks, taking Zyrtec. No other symptoms. Fasting. Health Maintenance Due   Topic Date Due    Shingrix Vaccine Age 49> (1 of 2)  Patient reports has not had. 10/29/1998    GLAUCOMA SCREENING Q2Y   Done per pt, this yr, 2/2018, Dr. Jade Gonzalez. 01/01/2018    BREAST CANCER SCRN MAMMOGRAM   Done per pt, 8/2018, [facility] Dr. Anthony Ayers 06/14/2018    Influenza Age 5 to Adult   Patient reports has not had. Requesting today.  08/01/2018       Wt Readings from Last 3 Encounters:   11/02/18 157 lb (71.2 kg)   08/17/18 155 lb (70.3 kg)   08/15/18 154 lb (69.9 kg)     Temp Readings from Last 3 Encounters:   11/02/18 98.5 °F (36.9 °C) (Oral)   08/17/18 97.6 °F (36.4 °C) (Oral)   08/15/18 97.6 °F (36.4 °C) (Oral)     BP Readings from Last 3 Encounters:   11/02/18 104/62   08/17/18 118/64   08/15/18 140/70     Pulse Readings from Last 3 Encounters:   11/02/18 (!) 56   08/17/18 (!) 59   08/15/18 (!) 52

## 2018-11-02 NOTE — PATIENT INSTRUCTIONS
Vaccine Information Statement    Influenza (Flu) Vaccine (Inactivated or Recombinant): What you need to know    Many Vaccine Information Statements are available in [place] and other languages. See www.immunize.org/vis  Hojas de Información Sobre Vacunas están disponibles en Español y en muchos otros idiomas. Visite www.immunize.org/vis    1. Why get vaccinated? Influenza (flu) is a contagious disease that spreads around the [place] every year, usually between October and May. Flu is caused by influenza viruses, and is spread mainly by coughing, sneezing, and close contact. Anyone can get flu. Flu strikes suddenly and can last several days. Symptoms vary by age, but can include:   fever/chills   sore throat   muscle aches   fatigue   cough   headache    runny or stuffy nose    Flu can also lead to pneumonia and blood infections, and cause diarrhea and seizures in children. If you have a medical condition, such as heart or lung disease, flu can make it worse. Flu is more dangerous for some people. Infants and young children, people 72years of age and older, pregnant women, and people with certain health conditions or a weakened immune system are at greatest risk. Each year thousands of people in the [facility] die from flu, and many more are hospitalized. Flu vaccine can:   keep you from getting flu,   make flu less severe if you do get it, and   keep you from spreading flu to your family and other people. 2. Inactivated and recombinant flu vaccines    A dose of flu vaccine is recommended every flu season. Children 6 months through 6years of age may need two doses during the same flu season. Everyone else needs only one dose each flu season.        Some inactivated flu vaccines contain a very small amount of a mercury-based preservative called thimerosal. Studies have not shown thimerosal in vaccines to be harmful, but flu vaccines that do not contain thimerosal are available. There is no live flu virus in flu shots. They cannot cause the flu. There are many flu viruses, and they are always changing. Each year a new flu vaccine is made to protect against three or four viruses that are likely to cause disease in the upcoming flu season. But even when the vaccine doesnt exactly match these viruses, it may still provide some protection    Flu vaccine cannot prevent:   flu that is caused by a virus not covered by the vaccine, or   illnesses that look like flu but are not. It takes about 2 weeks for protection to develop after vaccination, and protection lasts through the flu season. 3. Some people should not get this vaccine    Tell the person who is giving you the vaccine:     If you have any severe, life-threatening allergies. If you ever had a life-threatening allergic reaction after a dose of flu vaccine, or have a severe allergy to any part of this vaccine, you may be advised not to get vaccinated. Most, but not all, types of flu vaccine contain a small amount of egg protein.  If you ever had Guillain-Barré Syndrome (also called GBS). Some people with a history of GBS should not get this vaccine. This should be discussed with your doctor.  If you are not feeling well. It is usually okay to get flu vaccine when you have a mild illness, but you might be asked to come back when you feel better. 4. Risks of a vaccine reaction    With any medicine, including vaccines, there is a chance of reactions. These are usually mild and go away on their own, but serious reactions are also possible. Most people who get a flu shot do not have any problems with it.      Minor problems following a flu shot include:    soreness, redness, or swelling where the shot was given     hoarseness   sore, red or itchy eyes   cough   fever   aches   headache   itching   fatigue  If these problems occur, they usually begin soon after the shot and last 1 or 2 days. More serious problems following a flu shot can include the following:     There may be a small increased risk of Guillain-Barré Syndrome (GBS) after inactivated flu vaccine. This risk has been estimated at 1 or 2 additional cases per million people vaccinated. This is much lower than the risk of severe complications from flu, which can be prevented by flu vaccine.  Young children who get the flu shot along with pneumococcal vaccine (PCV13) and/or DTaP vaccine at the same time might be slightly more likely to have a seizure caused by fever. Ask your doctor for more information. Tell your doctor if a child who is getting flu vaccine has ever had a seizure. Problems that could happen after any injected vaccine:      People sometimes faint after a medical procedure, including vaccination. Sitting or lying down for about 15 minutes can help prevent fainting, and injuries caused by a fall. Tell your doctor if you feel dizzy, or have vision changes or ringing in the ears.  Some people get severe pain in the shoulder and have difficulty moving the arm where a shot was given. This happens very rarely.  Any medication can cause a severe allergic reaction. Such reactions from a vaccine are very rare, estimated at about 1 in a million doses, and would happen within a few minutes to a few hours after the vaccination. As with any medicine, there is a very remote chance of a vaccine causing a serious injury or death. The safety of vaccines is always being monitored. For more information, visit: www.cdc.gov/vaccinesafety/    5. What if there is a serious reaction? What should I look for?  Look for anything that concerns you, such as signs of a severe allergic reaction, very high fever, or unusual behavior.     Signs of a severe allergic reaction can include hives, swelling of the face and throat, difficulty breathing, a fast heartbeat, dizziness, and weakness - usually within a few minutes to a few hours after the vaccination. What should I do?  If you think it is a severe allergic reaction or other emergency that cant wait, call 9-1-1 and get the person to the nearest hospital. Otherwise, call your doctor.  Reactions should be reported to the Vaccine Adverse Event Reporting System (VAERS). Your doctor should file this report, or you can do it yourself through  the VAERS web site at www.vaers. [facility].gov, or by calling 1-496.937.1383. VAERS does not give medical advice. 6. The National Vaccine Injury Compensation Program    The [facility] Vaccine Injury Compensation Program (VICP) is a federal program that was created to compensate people who may have been injured by certain vaccines. Persons who believe they may have been injured by a vaccine can learn about the program and about filing a claim by calling 9-407.678.7246 or visiting the [employer] website at www.[facility].gov/vaccinecompensation. There is a time limit to file a claim for compensation. 7. How can I learn more?  Ask your healthcare provider. He or she can give you the vaccine package insert or suggest other sources of information.  Call your local or state health department.  Contact the Centers for Disease Control and Prevention (CDC):  - Call 1-933.333.7030 (1-800-CDC-INFO) or  - Visit CDCs website at www.cdc.gov/flu    Vaccine Information Statement   Inactivated Influenza Vaccine   8/7/2015  42 ANUPAM [address] [id number]    Department of Health and Human Services  Centers for Disease Control and Prevention    Office Use Only

## 2018-11-05 ENCOUNTER — TELEPHONE (OUTPATIENT)
Dept: INTERNAL MEDICINE CLINIC | Age: 68
End: 2018-11-05

## 2018-11-05 LAB
ALBUMIN SERPL-MCNC: 4.4 G/DL (ref 3.5–4.8)
ALBUMIN/GLOB SERPL: 2 {RATIO} (ref 1.2–2.2)
ALP SERPL-CCNC: 99 IU/L (ref 39–117)
ALT SERPL-CCNC: 15 IU/L (ref 0–32)
APPEARANCE UR: CLEAR
AST SERPL-CCNC: 19 IU/L (ref 0–40)
BACTERIA #/AREA URNS HPF: NORMAL /[HPF]
BACTERIA UR CULT: ABNORMAL
BASOPHILS # BLD AUTO: 0 X10E3/UL (ref 0–0.2)
BASOPHILS NFR BLD AUTO: 1 %
BILIRUB SERPL-MCNC: 0.5 MG/DL (ref 0–1.2)
BILIRUB UR QL STRIP: NEGATIVE
BUN SERPL-MCNC: 17 MG/DL (ref 8–27)
BUN/CREAT SERPL: 23 (ref 12–28)
CALCIUM SERPL-MCNC: 9.5 MG/DL (ref 8.7–10.3)
CASTS URNS QL MICRO: NORMAL /LPF
CHLORIDE SERPL-SCNC: 103 MMOL/L (ref 96–106)
CHOLEST SERPL-MCNC: 180 MG/DL (ref 100–199)
CO2 SERPL-SCNC: 25 MMOL/L (ref 20–29)
COLOR UR: YELLOW
CREAT SERPL-MCNC: 0.75 MG/DL (ref 0.57–1)
EOSINOPHIL # BLD AUTO: 0.1 X10E3/UL (ref 0–0.4)
EOSINOPHIL NFR BLD AUTO: 3 %
EPI CELLS #/AREA URNS HPF: NORMAL /HPF
ERYTHROCYTE [DISTWIDTH] IN BLOOD BY AUTOMATED COUNT: 13.8 % (ref 12.3–15.4)
GLOBULIN SER CALC-MCNC: 2.2 G/DL (ref 1.5–4.5)
GLUCOSE SERPL-MCNC: 92 MG/DL (ref 65–99)
GLUCOSE UR QL: NEGATIVE
HCT VFR BLD AUTO: 39.9 % (ref 34–46.6)
HDLC SERPL-MCNC: 63 MG/DL
HGB BLD-MCNC: 13.4 G/DL (ref 11.1–15.9)
HGB UR QL STRIP: NEGATIVE
IMM GRANULOCYTES # BLD: 0 X10E3/UL (ref 0–0.1)
IMM GRANULOCYTES NFR BLD: 0 %
INTERPRETATION, 910389: NORMAL
KETONES UR QL STRIP: NEGATIVE
LDLC SERPL CALC-MCNC: 94 MG/DL (ref 0–99)
LEUKOCYTE ESTERASE UR QL STRIP: ABNORMAL
LYMPHOCYTES # BLD AUTO: 1.2 X10E3/UL (ref 0.7–3.1)
LYMPHOCYTES NFR BLD AUTO: 28 %
MCH RBC QN AUTO: 32.7 PG (ref 26.6–33)
MCHC RBC AUTO-ENTMCNC: 33.6 G/DL (ref 31.5–35.7)
MCV RBC AUTO: 97 FL (ref 79–97)
MICRO URNS: ABNORMAL
MONOCYTES # BLD AUTO: 0.5 X10E3/UL (ref 0.1–0.9)
MONOCYTES NFR BLD AUTO: 13 %
MUCOUS THREADS URNS QL MICRO: PRESENT
NEUTROPHILS # BLD AUTO: 2.2 X10E3/UL (ref 1.4–7)
NEUTROPHILS NFR BLD AUTO: 55 %
NITRITE UR QL STRIP: NEGATIVE
PH UR STRIP: 5.5 [PH] (ref 5–7.5)
PLATELET # BLD AUTO: 338 X10E3/UL (ref 150–379)
POTASSIUM SERPL-SCNC: 5 MMOL/L (ref 3.5–5.2)
PROT SERPL-MCNC: 6.6 G/DL (ref 6–8.5)
PROT UR QL STRIP: NEGATIVE
RBC # BLD AUTO: 4.1 X10E6/UL (ref 3.77–5.28)
RBC #/AREA URNS HPF: NORMAL /HPF
SODIUM SERPL-SCNC: 143 MMOL/L (ref 134–144)
SP GR UR: 1.02 (ref 1–1.03)
T4 FREE SERPL-MCNC: 1.64 NG/DL (ref 0.82–1.77)
TRIGL SERPL-MCNC: 114 MG/DL (ref 0–149)
TSH SERPL DL<=0.005 MIU/L-ACNC: 1.27 UIU/ML (ref 0.45–4.5)
URINALYSIS REFLEX, 377202: ABNORMAL
UROBILINOGEN UR STRIP-MCNC: 0.2 MG/DL (ref 0.2–1)
VLDLC SERPL CALC-MCNC: 23 MG/DL (ref 5–40)
WBC # BLD AUTO: 4.1 X10E3/UL (ref 3.4–10.8)
WBC #/AREA URNS HPF: NORMAL /HPF

## 2018-11-05 RX ORDER — AMOXICILLIN 500 MG/1
500 CAPSULE ORAL 2 TIMES DAILY
Qty: 6 CAP | Refills: 0 | Status: SHIPPED | OUTPATIENT
Start: 2018-11-05 | End: 2018-11-08

## 2018-11-05 NOTE — TELEPHONE ENCOUNTER
Patient's recent labs show UTI. Recommended course of amoxicillin 500mg bid for 3 days. Patient is asymptomatic. Orders Placed This Encounter    amoxicillin (AMOXIL) 500 mg capsule     Sig: Take 1 Cap by mouth two (2) times a day for 3 days.      Dispense:  6 Cap     Refill:  0

## 2018-11-15 RX ORDER — LEVOTHYROXINE SODIUM 100 UG/1
TABLET ORAL
Qty: 90 TAB | Refills: 1 | Status: SHIPPED | OUTPATIENT
Start: 2018-11-15 | End: 2019-05-14 | Stop reason: SDUPTHER

## 2018-12-11 ENCOUNTER — OFFICE VISIT (OUTPATIENT)
Dept: INTERNAL MEDICINE CLINIC | Age: 68
End: 2018-12-11

## 2018-12-11 VITALS
HEART RATE: 55 BPM | DIASTOLIC BLOOD PRESSURE: 80 MMHG | BODY MASS INDEX: 26.32 KG/M2 | RESPIRATION RATE: 17 BRPM | OXYGEN SATURATION: 98 % | HEIGHT: 64 IN | WEIGHT: 154.2 LBS | TEMPERATURE: 98.2 F | SYSTOLIC BLOOD PRESSURE: 156 MMHG

## 2018-12-11 DIAGNOSIS — R10.31 RIGHT LOWER QUADRANT ABDOMINAL PAIN: ICD-10-CM

## 2018-12-11 DIAGNOSIS — K52.9 GASTROENTERITIS: Primary | ICD-10-CM

## 2018-12-11 RX ORDER — ONDANSETRON 4 MG/1
4 TABLET, ORALLY DISINTEGRATING ORAL
COMMUNITY
Start: 2018-12-04 | End: 2019-03-05

## 2018-12-11 NOTE — PROGRESS NOTES
Verified name and birth date for privacy precautions. Chart reviewed in preparation for today's visit. Chief Complaint   Patient presents with   [facility] Follow Up     12/4/18 at [facility] for Nausea, tingling in hands, and constipation. There are no preventive care reminders to display for this patient. Wt Readings from Last 3 Encounters:   12/11/18 154 lb 3.2 oz (69.9 kg)   11/02/18 157 lb (71.2 kg)   08/17/18 155 lb (70.3 kg)     Temp Readings from Last 3 Encounters:   12/11/18 98.2 °F (36.8 °C) (Oral)   11/02/18 98.5 °F (36.9 °C) (Oral)   08/17/18 97.6 °F (36.4 °C) (Oral)     BP Readings from Last 3 Encounters:   12/11/18 156/80   11/02/18 104/62   08/17/18 118/64     Pulse Readings from Last 3 Encounters:   12/11/18 (!) 55   11/02/18 (!) 56   08/17/18 (!) 59         Learning Assessment:  :     Learning Assessment 3/13/2018 2/4/2015 1/28/2014 1/20/2014 1/10/2013   PRIMARY LEARNER Patient Patient Patient Patient Patient   HIGHEST LEVEL OF EDUCATION - PRIMARY LEARNER  GRADUATED HIGH SCHOOL OR GED GRADUATED HIGH SCHOOL OR GED GRADUATED HIGH SCHOOL OR GED GRADUATED HIGH SCHOOL OR GED -   BARRIERS PRIMARY LEARNER NONE NONE NONE NONE -   CO-LEARNER CAREGIVER No No No No -   PRIMARY LANGUAGE ENGLISH ENGLISH ENGLISH ENGLISH ENGLISH    NEED - No - - -   LEARNER PREFERENCE PRIMARY DEMONSTRATION LISTENING LISTENING DEMONSTRATION DEMONSTRATION     - DEMONSTRATION DEMONSTRATION - -   LEARNING SPECIAL TOPICS - no - - -   ANSWERED BY patient patient Chandra Chiu patient patient   RELATIONSHIP SELF SELF SELF SELF SELF       Depression Screening:  :     PHQ over the last two weeks 7/6/2018   PHQ Not Done -   Little interest or pleasure in doing things Not at all   Feeling down, depressed, irritable, or hopeless Nearly every day   Total Score PHQ 2 3       Fall Risk Assessment:  :     Fall Risk Assessment, last 12 mths 8/15/2018   Able to walk? Yes   Fall in past 12 months?  No       Abuse Screening:  :     Abuse Screening Questionnaire 7/6/2018 9/11/2017 3/16/2015 3/18/2014   Do you ever feel afraid of your partner? N N N N   Are you in a relationship with someone who physically or mentally threatens you? N N N N   Is it safe for you to go home? Marleni Pederson       Coordination of Care Questionnaire:  :     1) Have you been to an emergency room, urgent care clinic since your last visit? yes   Hospitalized since your last visit? no             2) Have you seen or consulted any other health care providers outside of [address] [place] [address] since your last visit? no  (Include any pap smears or colon screenings in this section.)    3) Do you have an Advance Directive on file?  No    ------------------------------------------------

## 2018-12-11 NOTE — PROGRESS NOTES
Subjective: Brian Darby is a 77 y.o. female who presents today for follow up of her ER visit on 12/4/18. She was seen in [facility] ER. She states that she was diagnosed with a stomach bug.  Her  came down with similar symptoms the next day. She was noted to have a right adnexal lesion that needed further attention. She states that her abdominal pain and diarrhea has completely resolved. 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  coenzyme q10 300 mg cap Take 300 mg by mouth daily.  LACTOBACILLUS ACIDOPHILUS (PROBIOTIC PO) Take 1 Cap by mouth daily.  BOSWELLIA SURENDRA EXTRACT (BOSWELLIA SURENDRA XT, BULK,) Take  by mouth daily. Anti-inflammatory supplement. Takes one cap po daily.  MULTIVITAMIN PO Take  by mouth daily. Takes one po daily.  ondansetron (ZOFRAN ODT) 4 mg disintegrating tablet Take 4 mg by mouth.       raNITIdine (ZANTAC) 300 mg tab TAKE 1 TABLET DAILY 90 Tab 1    valACYclovir (VALTREX) 1 gram tablet TK 1 T PO TID  0        Review of Systems    Pertinent items are noted in HPI. Objective:     Visit Vitals  /80 (BP 1 Location: Left arm, BP Patient Position: Sitting)   Pulse (!) 55   Temp 98.2 °F (36.8 °C) (Oral)   Resp 17   Ht 5' 4\" (1.626 m)   Wt 154 lb 3.2 oz (69.9 kg)   SpO2 98%   BMI 26.47 kg/m²     General appearance: alert, cooperative, no distress, appears stated age  Head: Normocephalic, without obvious abnormality, atraumatic  Abdomen: soft, non-tender. Bowel sounds normal. No masses,  no organomegaly    Assessment/Plan:     1. Gastroenteritis  -resolved    2. Right lower quadrant abdominal pain  -will get ultrasound of right adnexal lesion for further clarification.     - US PELV NON OBS; Future     Orders Placed This Encounter    US PELV NON OBS     Standing Status:   Future     Standing Expiration Date:   1/11/2020     Order Specific Question:   Reason for Exam     Answer:   abnormal cyst on adnexa right noted on CT               Follow-up Disposition:    Return if symptoms worsen or fail to improve. Advised patient to call back or return to office if symptoms worsen/change/persist.     Discussed expected course/resolution/complications of diagnosis in detail with patient. Medication risks/benefits/costs/interactions/alternatives discussed with patient. Patient was given an after visit summary which includes diagnoses, current medications, & vitals. Patient expressed understanding with the diagnosis and plan.

## 2018-12-27 ENCOUNTER — PATIENT MESSAGE (OUTPATIENT)
Dept: INTERNAL MEDICINE CLINIC | Age: 68
End: 2018-12-27

## 2018-12-27 ENCOUNTER — TELEPHONE (OUTPATIENT)
Dept: INTERNAL MEDICINE CLINIC | Age: 68
End: 2018-12-27

## 2018-12-27 DIAGNOSIS — R19.7 DIARRHEA, UNSPECIFIED TYPE: Primary | ICD-10-CM

## 2018-12-27 NOTE — TELEPHONE ENCOUNTER
From: Filiberto Cantu  To: Matthew Welch MD  Sent: 12/27/2018 11:58 AM EST  Subject: Visit Jacolyn Bloodgood Dr. Lanell Burkitt had diarrhea for 3 days. I haven't had any GERD and trying to eat a BRAT diet. I've had to postpone my ultrasound. I have pains (not cramps) in my upper abdomen. I left a message with your nurse to see if I should take something. I don't know what's going on! When I have my pelvic ultrasound on 1/3 should we look at anything else? I know the CT didn't show anything but why am I still having diarrhea?   [facility]

## 2018-12-27 NOTE — TELEPHONE ENCOUNTER
Patient called stating that she has had diarrhea for three days. She wants to know if she should take an over the counter medication or something else. Please call her at [phone]

## 2018-12-28 ENCOUNTER — HOSPITAL ENCOUNTER (OUTPATIENT)
Dept: LAB | Age: 68
Discharge: HOME | End: 2018-12-28
Payer: MEDICARE

## 2018-12-28 PROCEDURE — 87507 IADNA-DNA/RNA PROBE TQ 12-25: CPT

## 2018-12-28 NOTE — TELEPHONE ENCOUNTER
Pt said dr pardo left a lab  Slip to get a stool test and she said she had diarrhea and do not think she can have the test done at this time can someone call her at [phone] to let her know what to do

## 2018-12-28 NOTE — TELEPHONE ENCOUNTER
Return call to pt - verified self and birth date. Pt states that she has not experienced symptoms of diarrhea since 5pm yesterday and she does not believe she will be able to leave a stool sample. Pt was encourage to obtain the specimen container(s) although she currently is not experiencing symptoms. This will allow for her to have them readily available, if needed. Pt verbalized understanding and agreed to plan.

## 2018-12-31 LAB
ADENOVIRUS F 40/41: NOT DETECTED
ASTROVIRUS: DETECTED
C DIFFICILE TOXIN A/B: NOT DETECTED
CAMPYLOBACTER: NOT DETECTED
CRYPTOSPORIDIUM, CRYPTOSPORIDIUM: NOT DETECTED
CYCLOSPORA CAYETANENSIS: NOT DETECTED
E COLI O157: ABNORMAL
ENTAMOEBA HISTOLYTICA: NOT DETECTED
ENTEROAGGREGATIVE E COLI: NOT DETECTED
ENTEROPATHOGENIC E COLI (EPEC), EPEC: NOT DETECTED
ENTEROTOXIGENIC E COLI (ETEC), ETEC: NOT DETECTED
GIARDIA LAMBLIA: NOT DETECTED
NOROVIRUS GI/GII: NOT DETECTED
PLESIOMONAS SHIGELLOIDES: NOT DETECTED
ROTAVIRUS A: NOT DETECTED
SALMONELLA: NOT DETECTED
SAPOVIRUS: NOT DETECTED
SHIGA-TOXIN-PRODUCING E COLI: NOT DETECTED
SHIGELLA/ENTEROINVASIVE E COLI (EIEC), EIEC: NOT DETECTED
VIBRIO CHOLERAE: NOT DETECTED
VIBRIO: NOT DETECTED
YERSINIA ENTEROCOLITICA: NOT DETECTED

## 2019-01-16 ENCOUNTER — HOSPITAL ENCOUNTER (OUTPATIENT)
Dept: ULTRASOUND IMAGING | Age: 69
Discharge: HOME | End: 2019-01-16
Attending: INTERNAL MEDICINE
Payer: MEDICARE

## 2019-01-16 DIAGNOSIS — R10.31 RIGHT LOWER QUADRANT ABDOMINAL PAIN: ICD-10-CM

## 2019-01-16 PROCEDURE — 76856 US EXAM PELVIC COMPLETE: CPT

## 2019-01-16 PROCEDURE — 76830 TRANSVAGINAL US NON-OB: CPT

## 2019-01-17 NOTE — PROGRESS NOTES
Right ovarian cyst noted. Recommend gyn evaluation. Referred patient to Dr. Yanet Faust.  Pt notified via [employer] 1/16/2019

## 2019-01-22 ENCOUNTER — OFFICE VISIT (OUTPATIENT)
Dept: GYNECOLOGY | Age: 69
End: 2019-01-22

## 2019-01-22 ENCOUNTER — HOSPITAL ENCOUNTER (OUTPATIENT)
Dept: LAB | Age: 69
Discharge: HOME | End: 2019-01-22
Payer: MEDICARE

## 2019-01-22 VITALS
WEIGHT: 151.2 LBS | BODY MASS INDEX: 25.81 KG/M2 | HEIGHT: 64 IN | HEART RATE: 71 BPM | DIASTOLIC BLOOD PRESSURE: 66 MMHG | SYSTOLIC BLOOD PRESSURE: 142 MMHG

## 2019-01-22 DIAGNOSIS — R93.89 THICKENED ENDOMETRIUM: ICD-10-CM

## 2019-01-22 DIAGNOSIS — N83.8 MASS OF RIGHT OVARY: Primary | ICD-10-CM

## 2019-01-22 PROCEDURE — 86304 IMMUNOASSAY TUMOR CA 125: CPT

## 2019-01-22 PROCEDURE — 36415 COLL VENOUS BLD VENIPUNCTURE: CPT

## 2019-01-22 NOTE — LETTER
2019 4:20 PM 
 
Patient:  Archie Collazo YOB: 1950 Date of Visit: 2019 Dear Amor Guerin MD 
[address] [facility] [place] [address] [id number] VIA In Basket 
 : Thank you for referring Ms. Can Guardado to me for evaluation/treatment. Below are the relevant portions of my assessment and plan of care. New patient referred by  for an ovarian cyst. 
 
[facility], later [facility] 
[facility] [address] [place], [address] 
P [phone]  F [phone] Office Note Patient ID: 
Name:  Archie Collazo MRN:  [id number] :  1950/68 y.o. Date:  2019 HISTORY OF PRESENT ILLNESS: 
Archie Collazo is a 77 y.o.  postmenopausal female who is being seen for a right ovarian mass. She is referred by Dr. Amor Guerin. She was seen in the ER at [facility] in 2018 with abdominal pain, nausea/vomiting, and diarrhea. A CT scan suggested possible enterocolitis, but no obstruction. She was noted to have an incidental findings of a 4.9 cm right ovarian cyst.  She subsequently followed up with Dr. Rolf Lorenzo.  She was sent for a pelvic ultrasound to better evaluated the ovarian cyst.  On the ultrasound it measured 4.8 cm. It was simple and well-circumscribed. Doppler flow was normal.  The contralateral ovary appeared normal.  There was no free fluid in the cul de sac. The endometrium, however, was a bit thickened, measuring 8 mm. She denies any vaginal bleeding or discharge. Her abdominal symptoms have now resolved. She denies a family history of gynecologic malignancy. ROS: 
 and GI review:  Negative Cardiopulmonary review:  Negative Musculoskeletal:  Negative A comprehensive review of systems was negative except for that written in the History of Present Illness. , 10 point ROS 
 
 
OB/GYN ROS: 
There is no history of significant gyn problems or procedures. Patient denies any abnormal bleeding or vaginal discharge. Problem List: 
Patient Active Problem List  
 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  
 HTN (hypertension) 12/07/2011  Fibrocystic breast 12/07/2011  S/p tibial fracture 12/07/2011  S/P hemorrhoidectomy 12/07/2011  Environmental allergies 12/07/2011  S/P laparoscopic cholecystectomy 12/07/2011  
 Hiatal hernia 12/07/2011 PMH: 
Past Medical History:  
Diagnosis Date  Arthritis   
 lower back  Cancer ([address] BCCAs removed - 6 on legs  Depression  Environmental allergies 12/7/2011  Fibrocystic breast 12/7/2011  GERD (gastroesophageal reflux disease)  Hiatal hernia 12/7/2011  
 HIGH BLOOD PRESSURE   
 not on meds  HTN (hypertension) 12/7/2011  Hypothyroid 12/7/2011  Ill-defined condition   
 prediabetic  Nausea & vomiting  S/P hemorrhoidectomy 12/7/2011  S/P laparoscopic cholecystectomy 12/7/2011  S/p tibial fracture 12/7/2011  Sleep apnea CPAP not used/was mild and pt states not a problem now with wt loss  Tendonitis of wrist, left 12/2017  
 cortisone shot  Thyroid disorder PSH: 
Past Surgical History:  
Procedure Laterality Date  ENDOSCOPY, COLON, DIAGNOSTIC  11/2010 ([facility])-f/u 5 yrs.  HX BREAST LUMPECTOMY    
 right - benign  HX CHOLECYSTECTOMY  HX GI    
 hemorrhoidectomy  HX ORTHOPAEDIC    
 surgery for tibial plateau fx - took bone from hip/has hardware knee to ankle Social History: 
Social History Tobacco Use  Smoking status: Never Smoker  Smokeless tobacco: Never Used Substance Use Topics  Alcohol use: Yes Alcohol/week: 4.2 oz Types: 7 Glasses of wine per week Family History: 
Family History Problem Relation Age of Onset  Alcohol abuse Mother   
     cirrhosis  Alcohol abuse Father  Other Father   
     cerebral hemorrhage  Cancer Brother   
     prostate  Heart Disease Brother 52  
     MI(age 52), bypass(66)  Diabetes Maternal Aunt  Hypertension Maternal Aunt  Cancer Maternal Uncle COLON  
 Heart Disease Maternal Grandmother MI  
 Cancer Cousin BREAST Medications: (reviewed) Current Outpatient Medications Medication Sig  
 raNITIdine (ZANTAC) 300 mg tab TAKE 1 TABLET DAILY  simvastatin (ZOCOR) 40 mg tablet TAKE 1 TABLET NIGHTLY  
 SYNTHROID 100 mcg tablet TAKE 1 TABLET DAILY BEFORE BREAKFAST  FLUoxetine (PROZAC) 20 mg capsule TAKE 1 CAPSULE DAILY  coenzyme q10 300 mg cap Take 300 mg by mouth daily.  LACTOBACILLUS ACIDOPHILUS (PROBIOTIC PO) Take 1 Cap by mouth daily.  BOSWELLIA SURENDRA EXTRACT (BOSWELLIA SURENDRA XT, BULK,) Take  by mouth daily. Anti-inflammatory supplement. Takes one cap po daily.  MULTIVITAMIN PO Take  by mouth daily. Takes one po daily.  ondansetron (ZOFRAN ODT) 4 mg disintegrating tablet Take 4 mg by mouth.  aspirin delayed-release 81 mg tablet Take 81 mg by mouth every other day. No current facility-administered medications for this visit. Allergies: (reviewed) Allergies Allergen Reactions  Adhesive Hives  Cephalexin Diarrhea  E-Mycin [Erythromycin] Other (comments) GI upset  Sudafed [Pseudoephedrine Hcl] Palpitations OBJECTIVE: 
 
Physical Exam: VITAL SIGNS: Vitals:  
 01/22/19 1454 BP: 142/66 Pulse: 71 Weight: 151 lb 3.2 oz (68.6 kg) Height: 5' 4\" (1.626 m) Body mass index is 25.95 kg/m². GENERAL DANIEL: Conversant, alert, oriented. No acute distress. HEENT: HEENT. No thyroid enlargement. No JVD. Neck: Supple without restrictions. RESPIRATORY: Clear to auscultation and percussion to the bases. No CVAT. CARDIOVASC: RRR without murmur/rub. GASTROINT: soft, non-tender, without masses or organomegaly MUSCULOSKEL: no joint tenderness, deformity or swelling EXTREMITIES: extremities normal, atraumatic, no cyanosis or edema PELVIC: Vulva and vagina appear normal. Normal cervix, but there was a small polyp protruding from the endocervical canal.  Bimanual exam reveals normal uterus and adnexa. RECTAL: Deferred MARTIN SURVEY: No suspicious lymphadenopathy or edema noted. NEURO: Grossly intact. No acute deficit. Lab Data: 
 
Lab Results Component Value Date/Time WBC 4.1 11/02/2018 10:30 AM  
 HGB 13.4 11/02/2018 10:30 AM  
 HCT 39.9 11/02/2018 10:30 AM  
 PLATELET 497 92/15/1409 10:30 AM  
 MCV 97 11/02/2018 10:30 AM  
 
Lab Results Component Value Date/Time Sodium 143 11/02/2018 10:30 AM  
 Potassium 5.0 11/02/2018 10:30 AM  
 Chloride 103 11/02/2018 10:30 AM  
 CO2 25 11/02/2018 10:30 AM  
 Anion gap 9 07/15/2010 11:10 AM  
 Glucose 92 11/02/2018 10:30 AM  
 BUN 17 11/02/2018 10:30 AM  
 Creatinine 0.75 11/02/2018 10:30 AM  
 BUN/Creatinine ratio 23 11/02/2018 10:30 AM  
 GFR est AA 93 11/02/2018 10:30 AM  
 GFR est non-AA 81 11/02/2018 10:30 AM  
 Calcium 9.5 11/02/2018 10:30 AM  
 
 
 
CT of abdomen/pelvis (12/4/18) - [facility] Lower chest:  Within normal limits. Vasculature:  Scattered atherosclerosis of the aortoiliac system without 
aneurysm. Liver, gallbladder and biliary system:  Status post cholecystectomy.  The 
liver has a normal appearance. Spleen:  Within normal limits. Pancreas:  Within normal limits. Adrenal glands: Within normal limits. Kidneys, ureters and urinary bladder:  No nephrolithiasis, hydronephrosis 
or perinephric stranding.  In the interpolar region of the right 
kidney is an oval-shaped 2.0 cm cyst.  No hydroureter.  Evaluation for 
small punctate ureteral stones is limited by excreted contrast.  Urinary 
bladder is within normal limits. GI tract:  Tiny hiatal hernia is present.  The stomach is otherwise normal. 
There are several fluid-filled but nondilated loops of small bowel 
throughout the abdomen and pelvis.  The appendix is tentatively identified. No right lower quadrant inflammatory changes.  The cecum is somewhat 
patulous and fluid-filled.  Mild amount of fluid within the proximal 
ascending colon.  Moderate to large distal colonic and rectal stool burden. No colonic diverticuli. Reproductive organs: Within the right adnexa is a rounded 4.9 cm 
hypodensity measuring fluid attenuation. Peritoneum, retroperitoneum and mesentery:  No bulky lympadenopathy.  No 
suspicious free fluid.  No free air identified. Superficial soft tissues: Within normal limits. Skeletal structures:  There is asymmetric contour deformity of the superior 
aspect of the right iliac crest and a small 9 mm lucent defect with overall 
benign appearance.  Degenerative changes of the spine are present.  No 
aggressive osseous lytic or blastic lesions. Result Impression 1. Several fluid-filled but nondilated loops of small bowel with moderate 
amount of fluid within the cecum and proximal ascending colon.  Findings 
likely represent enterocolitis. 2. No evidence of bowel obstruction, acute inflammatory process or 
urolithiasis. 3. Tiny hiatal hernia. 4. Nearly 5 cm fluid density lesion within the right adnexa, likely 
representing a cyst.  Routine outpatient pelvic ultrasound is recommended. 5. Moderate to large rectal stool burden which can be seen in the setting 
of impaction. Pelvic ultrasound (1/16/19) FINDINGS TRANSABDOMINAL:  The UTERUS MEASURES 6.6 x 2.6 x 4.1 cm. The central 
endometrium measures 5mm in thickness. A small amount of fluid and heterogeneity 
are noted in the endometrial canal.. 
  
There is no free fluid in the cul-de-sac. 
  
The RIGHT OVARY measures 2.0 x 5.6 x 3.8cm . The LEFT OVARY measures 1.6 x 1.2 x 
1.3 cm . The right ovary shows a dominant well-circumscribed cyst measuring 5.0 
x 4.0 x 3.8 cm. Both ovaries show normal blood flow by color Doppler. The left 
ovary is normal. There is no free fluid in the cul-de-sac. 
  
  
FINDINGS TRANSVAGINAL:  The uterus shows overall size of 6.1 x 2.7 x 3.9.. Small 
heterogeneous fluid collections are noted in the endometrial canal which is 
approximately 8 mm in thickness. The internal cervical os is closed. 
  
Further evaluation of the ovaries transvaginally reveals the right ovary 
measures 1.6 x 0.9 x 1.7 cm, exclusive of a large simple cyst, and the left 
ovary measures 1.9 x 1.5 x 1.4 cm. . The right ovarian cyst measures 4.8 x 3.6 x 
3.6 cm. Both ovaries show normal blood flow by color Doppler. 
  
IMPRESSION:  
1. Transabdominal pelvic ultrasound revealing large right ovarian cyst. Normal 
left ovary. . Follow-up to clearing of the right ovarian cyst is recommended. Further evaluation of the current endometrial appearance is suggested. 2. Transvaginal pelvic ultrasound revealing right ovarian cyst 4.8 cm, for which 
follow-up to clearing is recommended. Normal left ovary. Heterogeneous 
endometrial canal, for which further evaluation is recommended. This appearance 
may represent polyps, cysts or other indeterminate abnormality. Neoplasm is not 
excluded. IMPRESSION/PLAN: 
Orlando Alonso is a 77 y.o. female with a working diagnosis of right ovarian cystic mass, as well as a thickened endometrium. I reviewed with Orlando Alonso her medical records, physical exam, and review of symptoms. I reassured her that the cystic ovarian mass on the right was most likely benign, as it appeared simple, was not that large, and had normal doppler flow. I discussed options with her, including observation and surgical removal.  I felt that both were reasonable. She would like to avoid a big surgery if possible. I suggested that we just repeat an ultrasound if a couple of months to make sure that it remains stable in size and appearance. If things change, we can then consider surgical excision. As for the thickened endometrium on ultrasound, I explained that in a postmenopausal woman this requires evaluation. I suggested a hysteroscopy/D&C to evaluate. This is an outpatient procedure and recovery would be minimal.  If malignancy is identified, we will then proceed with definitive surgery for both the uterus and the ovary. She was counseled on the risks, benefits, indications, and alternatives of surgery. Her questions were answered and she wishes to proceed as planned. Signed By: Chloe Pruitt MD   
 1/22/2019/3:13 PM  
 
 
 
If you have questions, please do not hesitate to call me. I look forward to following Ms. Corbin along with you.  
 
 
 
Sincerely, 
 
 
Chloe Pruitt MD

## 2019-01-22 NOTE — PROGRESS NOTES
[address] [place] [address] [address], [address]  P [phone]  F [phone]    Office Note  Patient ID:  Name:  Aaron Hickey  MRN:  [id number]  :  1950/68 y.o. Date:  2019      HISTORY OF PRESENT ILLNESS:  Aaron Hickey is a 77 y.o.  postmenopausal female who is being seen for a right ovarian mass. She is referred by Dr. Benjamin Andersen. She was seen in the ER at [facility] in 2018 with abdominal pain, nausea/vomiting, and diarrhea. A CT scan suggested possible enterocolitis, but no obstruction. She was noted to have an incidental findings of a 4.9 cm right ovarian cyst.  She subsequently followed up with Dr. Richard Marte.  She was sent for a pelvic ultrasound to better evaluated the ovarian cyst.  On the ultrasound it measured 4.8 cm. It was simple and well-circumscribed. Doppler flow was normal.  The contralateral ovary appeared normal.  There was no free fluid in the cul de sac. The endometrium, however, was a bit thickened, measuring 8 mm. She denies any vaginal bleeding or discharge. Her abdominal symptoms have now resolved. She denies a family history of gynecologic malignancy. ROS:   and GI review:  Negative  Cardiopulmonary review:  Negative   Musculoskeletal:  Negative    A comprehensive review of systems was negative except for that written in the History of Present Illness. , 10 point ROS      OB/GYN ROS:  There is no history of significant gyn problems or procedures. Patient denies any abnormal bleeding or vaginal discharge.        Problem List:  Patient Active Problem List    Diagnosis Date Noted    Mass of right ovary 2019    Thickened endometrium 2019    Advance directive discussed with patient 2018    H/O bone density study 2016    Agatston CAC score 100-199 06/10/2016    Nuclear cataract 2016    Benign neoplasm of choroid 2016    Hyperlipidemia 01/10/2013    Colon cancer screening 07/10/2012    History of screening mammography 07/10/2012    Pap smear for cervical cancer screening 07/10/2012    Hypothyroid 12/07/2011    Depression 12/07/2011    HTN (hypertension) 12/07/2011    Fibrocystic breast 12/07/2011    S/p tibial fracture 12/07/2011    S/P hemorrhoidectomy 12/07/2011    Environmental allergies 12/07/2011    S/P laparoscopic cholecystectomy 12/07/2011    Hiatal hernia 12/07/2011     PMH:  Past Medical History:   Diagnosis Date    Arthritis     lower back    Cancer ([address]     BCCAs removed - 6 on legs    Depression     Environmental allergies 12/7/2011    Fibrocystic breast 12/7/2011    GERD (gastroesophageal reflux disease)     Hiatal hernia 12/7/2011    HIGH BLOOD PRESSURE     not on meds    HTN (hypertension) 12/7/2011    Hypothyroid 12/7/2011    Ill-defined condition     prediabetic    Nausea & vomiting     S/P hemorrhoidectomy 12/7/2011    S/P laparoscopic cholecystectomy 12/7/2011    S/p tibial fracture 12/7/2011    Sleep apnea     CPAP not used/was mild and pt states not a problem now with wt loss    Tendonitis of wrist, left 12/2017    cortisone shot    Thyroid disorder       PSH:  Past Surgical History:   Procedure Laterality Date    ENDOSCOPY, COLON, DIAGNOSTIC  11/2010    ([facility])-f/u 5 yrs.  HX BREAST LUMPECTOMY      right - benign    HX CHOLECYSTECTOMY      HX GI      hemorrhoidectomy    HX ORTHOPAEDIC      surgery for tibial plateau fx - took bone from hip/has hardware knee to ankle      Social History:  Social History     Tobacco Use    Smoking status: Never Smoker    Smokeless tobacco: Never Used   Substance Use Topics    Alcohol use:  Yes     Alcohol/week: 4.2 oz     Types: 7 Glasses of wine per week      Family History:  Family History   Problem Relation Age of Onset    Alcohol abuse Mother         cirrhosis    Alcohol abuse Father     Other Father         cerebral hemorrhage    Cancer Brother         prostate    Heart Disease Brother 46        MI(age 46), bypass(66)    Diabetes Maternal Aunt     Hypertension Maternal Aunt     Cancer Maternal Uncle         COLON    Heart Disease Maternal Grandmother         MI    Cancer Cousin         BREAST      Medications: (reviewed)  Current Outpatient Medications   Medication Sig    raNITIdine (ZANTAC) 300 mg tab TAKE 1 TABLET DAILY    simvastatin (ZOCOR) 40 mg tablet TAKE 1 TABLET NIGHTLY    SYNTHROID 100 mcg tablet TAKE 1 TABLET DAILY BEFORE BREAKFAST    FLUoxetine (PROZAC) 20 mg capsule TAKE 1 CAPSULE DAILY    coenzyme q10 300 mg cap Take 300 mg by mouth daily.  LACTOBACILLUS ACIDOPHILUS (PROBIOTIC PO) Take 1 Cap by mouth daily.  BOSWELLIA SURENDRA EXTRACT (BOSWELLIA SURENDRA XT, BULK,) Take  by mouth daily. Anti-inflammatory supplement. Takes one cap po daily.  MULTIVITAMIN PO Take  by mouth daily. Takes one po daily.  ondansetron (ZOFRAN ODT) 4 mg disintegrating tablet Take 4 mg by mouth.  aspirin delayed-release 81 mg tablet Take 81 mg by mouth every other day. No current facility-administered medications for this visit. Allergies: (reviewed)  Allergies   Allergen Reactions    Adhesive Hives    Cephalexin Diarrhea    E-Mycin [Erythromycin] Other (comments)     GI upset    Sudafed [Pseudoephedrine Hcl] Palpitations          OBJECTIVE:    Physical Exam:  VITAL SIGNS: Vitals:    01/22/19 1454   BP: 142/66   Pulse: 71   Weight: 151 lb 3.2 oz (68.6 kg)   Height: 5' 4\" (1.626 m)     Body mass index is 25.95 kg/m². GENERAL DANIEL: Conversant, alert, oriented. No acute distress. HEENT: HEENT. No thyroid enlargement. No JVD. Neck: Supple without restrictions. RESPIRATORY: Clear to auscultation and percussion to the bases. No CVAT. CARDIOVASC: RRR without murmur/rub.    GASTROINT: soft, non-tender, without masses or organomegaly   MUSCULOSKEL: no joint tenderness, deformity or swelling   EXTREMITIES: extremities normal, atraumatic, no cyanosis or edema PELVIC: Vulva and vagina appear normal. Normal cervix, but there was a small polyp protruding from the endocervical canal.  Bimanual exam reveals normal uterus and adnexa. RECTAL: Deferred   MARTIN SURVEY: No suspicious lymphadenopathy or edema noted. NEURO: Grossly intact. No acute deficit. Lab Data:    Lab Results   Component Value Date/Time    WBC 4.1 11/02/2018 10:30 AM    HGB 13.4 11/02/2018 10:30 AM    HCT 39.9 11/02/2018 10:30 AM    PLATELET 647 50/61/6989 10:30 AM    MCV 97 11/02/2018 10:30 AM     Lab Results   Component Value Date/Time    Sodium 143 11/02/2018 10:30 AM    Potassium 5.0 11/02/2018 10:30 AM    Chloride 103 11/02/2018 10:30 AM    CO2 25 11/02/2018 10:30 AM    Anion gap 9 07/15/2010 11:10 AM    Glucose 92 11/02/2018 10:30 AM    BUN 17 11/02/2018 10:30 AM    Creatinine 0.75 11/02/2018 10:30 AM    BUN/Creatinine ratio 23 11/02/2018 10:30 AM    GFR est AA 93 11/02/2018 10:30 AM    GFR est non-AA 81 11/02/2018 10:30 AM    Calcium 9.5 11/02/2018 10:30 AM         CT of abdomen/pelvis (12/4/18) - [facility] chest:  Within normal limits. Vasculature:  Scattered atherosclerosis of the aortoiliac system without  aneurysm. Liver, gallbladder and biliary system:  Status post cholecystectomy.  The  liver has a normal appearance. Spleen:  Within normal limits. Pancreas:  Within normal limits. Adrenal glands: Within normal limits. Kidneys, ureters and urinary bladder:  No nephrolithiasis, hydronephrosis  or perinephric stranding.  In the interpolar region of the right  kidney is an oval-shaped 2.0 cm cyst.  No hydroureter.  Evaluation for  small punctate ureteral stones is limited by excreted contrast.  Urinary  bladder is within normal limits.     GI tract:  Tiny hiatal hernia is present.  The stomach is otherwise normal.  There are several fluid-filled but nondilated loops of small bowel  throughout the abdomen and pelvis.  The appendix is tentatively identified. No right lower quadrant inflammatory changes.  The cecum is somewhat  patulous and fluid-filled.  Mild amount of fluid within the proximal  ascending colon.  Moderate to large distal colonic and rectal stool burden. No colonic diverticuli. Reproductive organs: Within the right adnexa is a rounded 4.9 cm  hypodensity measuring fluid attenuation. Peritoneum, retroperitoneum and mesentery:  No bulky lympadenopathy.  No  suspicious free fluid.  No free air identified. Superficial soft tissues: Within normal limits. Skeletal structures:  There is asymmetric contour deformity of the superior  aspect of the right iliac crest and a small 9 mm lucent defect with overall  benign appearance.  Degenerative changes of the spine are present.  No  aggressive osseous lytic or blastic lesions. Result Impression   1. Several fluid-filled but nondilated loops of small bowel with moderate  amount of fluid within the cecum and proximal ascending colon.  Findings  likely represent enterocolitis. 2. No evidence of bowel obstruction, acute inflammatory process or  urolithiasis. 3. Tiny hiatal hernia. 4. Nearly 5 cm fluid density lesion within the right adnexa, likely  representing a cyst.  Routine outpatient pelvic ultrasound is recommended. 5. Moderate to large rectal stool burden which can be seen in the setting  of impaction. Pelvic ultrasound (1/16/19)  FINDINGS TRANSABDOMINAL:  The UTERUS MEASURES 6.6 x 2.6 x 4.1 cm. The central  endometrium measures 5mm in thickness. A small amount of fluid and heterogeneity  are noted in the endometrial canal..     There is no free fluid in the cul-de-sac.     The RIGHT OVARY measures 2.0 x 5.6 x 3.8cm . The LEFT OVARY measures 1.6 x 1.2 x  1.3 cm . The right ovary shows a dominant well-circumscribed cyst measuring 5.0  x 4.0 x 3.8 cm. Both ovaries show normal blood flow by color Doppler.  The left  ovary is normal. There is no free fluid in the cul-de-sac.        FINDINGS TRANSVAGINAL:  The uterus shows overall size of 6.1 x 2.7 x 3.9.. Small  heterogeneous fluid collections are noted in the endometrial canal which is  approximately 8 mm in thickness. The internal cervical os is closed.     Further evaluation of the ovaries transvaginally reveals the right ovary  measures 1.6 x 0.9 x 1.7 cm, exclusive of a large simple cyst, and the left  ovary measures 1.9 x 1.5 x 1.4 cm. . The right ovarian cyst measures 4.8 x 3.6 x  3.6 cm. Both ovaries show normal blood flow by color Doppler.     IMPRESSION:   1. Transabdominal pelvic ultrasound revealing large right ovarian cyst. Normal  left ovary. . Follow-up to clearing of the right ovarian cyst is recommended. Further evaluation of the current endometrial appearance is suggested. 2. Transvaginal pelvic ultrasound revealing right ovarian cyst 4.8 cm, for which  follow-up to clearing is recommended. Normal left ovary. Heterogeneous  endometrial canal, for which further evaluation is recommended. This appearance  may represent polyps, cysts or other indeterminate abnormality. Neoplasm is not  excluded. IMPRESSION/PLAN:  Afshan Craven is a 77 y.o. female with a working diagnosis of right ovarian cystic mass, as well as a thickened endometrium. I reviewed with Afshan Cravne her medical records, physical exam, and review of symptoms. I reassured her that the cystic ovarian mass on the right was most likely benign, as it appeared simple, was not that large, and had normal doppler flow. I discussed options with her, including observation and surgical removal.  I felt that both were reasonable. She would like to avoid a big surgery if possible. I suggested that we just repeat an ultrasound if a couple of months to make sure that it remains stable in size and appearance. If things change, we can then consider surgical excision.   As for the thickened endometrium on ultrasound, I explained that in a postmenopausal woman this requires evaluation. I suggested a hysteroscopy/D&C to evaluate. This is an outpatient procedure and recovery would be minimal.  If malignancy is identified, we will then proceed with definitive surgery for both the uterus and the ovary. She was counseled on the risks, benefits, indications, and alternatives of surgery. Her questions were answered and she wishes to proceed as planned.         Signed By: Elder Dalton MD     1/22/2019/3:13 PM

## 2019-01-23 LAB — CANCER AG125 SERPL-ACNC: 10.6 U/ML (ref 0–38.1)

## 2019-02-28 RX ORDER — FLUOXETINE HYDROCHLORIDE 20 MG/1
CAPSULE ORAL
Qty: 90 CAP | Refills: 1 | Status: SHIPPED | OUTPATIENT
Start: 2019-02-28 | End: 2019-08-27 | Stop reason: SDUPTHER

## 2019-03-05 ENCOUNTER — HOSPITAL ENCOUNTER (OUTPATIENT)
Dept: PREADMISSION TESTING | Age: 69
Discharge: HOME | End: 2019-03-05
Payer: MEDICARE

## 2019-03-05 VITALS
TEMPERATURE: 97.5 F | SYSTOLIC BLOOD PRESSURE: 162 MMHG | HEIGHT: 65 IN | BODY MASS INDEX: 25.16 KG/M2 | WEIGHT: 151 LBS | DIASTOLIC BLOOD PRESSURE: 77 MMHG | HEART RATE: 55 BPM

## 2019-03-05 LAB
ALBUMIN SERPL-MCNC: 3.9 G/DL (ref 3.5–5)
ALBUMIN/GLOB SERPL: 1.3 {RATIO} (ref 1.1–2.2)
ALP SERPL-CCNC: 114 U/L (ref 45–117)
ALT SERPL-CCNC: 32 U/L (ref 12–78)
ANION GAP SERPL CALC-SCNC: 9 MMOL/L (ref 5–15)
AST SERPL-CCNC: 20 U/L (ref 15–37)
ATRIAL RATE: 56 BPM
BASOPHILS # BLD: 0.1 K/UL (ref 0–0.1)
BASOPHILS NFR BLD: 1 % (ref 0–1)
BILIRUB SERPL-MCNC: 0.4 MG/DL (ref 0.2–1)
BUN SERPL-MCNC: 18 MG/DL (ref 6–20)
BUN/CREAT SERPL: 28 (ref 12–20)
CALCIUM SERPL-MCNC: 9.3 MG/DL (ref 8.5–10.1)
CALCULATED P AXIS, ECG09: 50 DEGREES
CALCULATED R AXIS, ECG10: 0 DEGREES
CALCULATED T AXIS, ECG11: 8 DEGREES
CHLORIDE SERPL-SCNC: 104 MMOL/L (ref 97–108)
CO2 SERPL-SCNC: 27 MMOL/L (ref 21–32)
CREAT SERPL-MCNC: 0.65 MG/DL (ref 0.55–1.02)
DIAGNOSIS, 93000: NORMAL
DIFFERENTIAL METHOD BLD: ABNORMAL
EOSINOPHIL # BLD: 0.1 K/UL (ref 0–0.4)
EOSINOPHIL NFR BLD: 3 % (ref 0–7)
ERYTHROCYTE [DISTWIDTH] IN BLOOD BY AUTOMATED COUNT: 12.6 % (ref 11.5–14.5)
GLOBULIN SER CALC-MCNC: 3.1 G/DL (ref 2–4)
GLUCOSE SERPL-MCNC: 82 MG/DL (ref 65–100)
HCT VFR BLD AUTO: 40.6 % (ref 35–47)
HGB BLD-MCNC: 13.7 G/DL (ref 11.5–16)
IMM GRANULOCYTES # BLD AUTO: 0 K/UL (ref 0–0.04)
IMM GRANULOCYTES NFR BLD AUTO: 0 % (ref 0–0.5)
LYMPHOCYTES # BLD: 1.3 K/UL (ref 0.8–3.5)
LYMPHOCYTES NFR BLD: 30 % (ref 12–49)
MCH RBC QN AUTO: 32.4 PG (ref 26–34)
MCHC RBC AUTO-ENTMCNC: 33.7 G/DL (ref 30–36.5)
MCV RBC AUTO: 96 FL (ref 80–99)
MONOCYTES # BLD: 0.7 K/UL (ref 0–1)
MONOCYTES NFR BLD: 17 % (ref 5–13)
NEUTS SEG # BLD: 2.1 K/UL (ref 1.8–8)
NEUTS SEG NFR BLD: 49 % (ref 32–75)
NRBC # BLD: 0 K/UL (ref 0–0.01)
NRBC BLD-RTO: 0 PER 100 WBC
P-R INTERVAL, ECG05: 144 MS
PLATELET # BLD AUTO: 299 K/UL (ref 150–400)
PMV BLD AUTO: 10.3 FL (ref 8.9–12.9)
POTASSIUM SERPL-SCNC: 4.3 MMOL/L (ref 3.5–5.1)
PROT SERPL-MCNC: 7 G/DL (ref 6.4–8.2)
Q-T INTERVAL, ECG07: 444 MS
QRS DURATION, ECG06: 82 MS
QTC CALCULATION (BEZET), ECG08: 428 MS
RBC # BLD AUTO: 4.23 M/UL (ref 3.8–5.2)
SODIUM SERPL-SCNC: 140 MMOL/L (ref 136–145)
VENTRICULAR RATE, ECG03: 56 BPM
WBC # BLD AUTO: 4.2 K/UL (ref 3.6–11)

## 2019-03-05 PROCEDURE — 36415 COLL VENOUS BLD VENIPUNCTURE: CPT

## 2019-03-05 PROCEDURE — 85025 COMPLETE CBC W/AUTO DIFF WBC: CPT

## 2019-03-05 PROCEDURE — 93005 ELECTROCARDIOGRAM TRACING: CPT

## 2019-03-05 PROCEDURE — 80053 COMPREHEN METABOLIC PANEL: CPT

## 2019-03-07 NOTE — H&P
[address] [facility] [address] [phone]  F [phone]        Patient ID:  Name:  Adelia Nolasco  MRN:  [id number]  :  1950/68 y.o. Date:  3/8/2019      HISTORY OF PRESENT ILLNESS:  Adelia Nolasco is a 77 y.o.  postmenopausal female who is being seen for a right ovarian mass. She is referred by Dr. Indu Martinez. She was seen in the ER at [facility] in 2018 with abdominal pain, nausea/vomiting, and diarrhea. A CT scan suggested possible enterocolitis, but no obstruction. She was noted to have an incidental findings of a 4.9 cm right ovarian cyst.  She subsequently followed up with Dr. Hiral Sepulveda.  She was sent for a pelvic ultrasound to better evaluated the ovarian cyst.  On the ultrasound it measured 4.8 cm. It was simple and well-circumscribed. Doppler flow was normal.  The contralateral ovary appeared normal.  There was no free fluid in the cul de sac. Her CA-125 was also normal.  The endometrium, however, was a bit thickened, measuring 8 mm. She denies any vaginal bleeding or discharge. Her abdominal symptoms have now resolved. She denies a family history of gynecologic malignancy. ROS:   and GI review:  Negative  Cardiopulmonary review:  Negative   Musculoskeletal:  Negative    A comprehensive review of systems was negative except for that written in the History of Present Illness. , 10 point ROS      OB/GYN ROS:  There is no history of significant gyn problems or procedures. Patient denies any abnormal bleeding or vaginal discharge.        Problem List:  Patient Active Problem List    Diagnosis Date Noted    Mass of right ovary 2019    Thickened endometrium 2019    Advance directive discussed with patient 2018    H/O bone density study 2016    Agatston CAC score 100-199 06/10/2016    Nuclear cataract 2016    Benign neoplasm of choroid 2016    Hyperlipidemia 01/10/2013    Colon cancer screening 07/10/2012    History of screening mammography 07/10/2012    Pap smear for cervical cancer screening 07/10/2012    Hypothyroid 12/07/2011    Depression 12/07/2011    HTN (hypertension) 12/07/2011    Fibrocystic breast 12/07/2011    S/p tibial fracture 12/07/2011    S/P hemorrhoidectomy 12/07/2011    Environmental allergies 12/07/2011    S/P laparoscopic cholecystectomy 12/07/2011    Hiatal hernia 12/07/2011     PMH:  Past Medical History:   Diagnosis Date    Arthritis     lower back    Cancer ([address]     BCCAs removed - 6 on legs    Depression     Environmental allergies 12/7/2011    Fibrocystic breast 12/7/2011    GERD (gastroesophageal reflux disease)     Hiatal hernia 12/7/2011    HIGH BLOOD PRESSURE     not on meds    HTN (hypertension) 12/7/2011    Hypothyroid 12/7/2011    Nausea & vomiting     S/P hemorrhoidectomy 12/7/2011    S/P laparoscopic cholecystectomy 12/7/2011    S/p tibial fracture 12/7/2011    Tendonitis of wrist, left 12/2017    cortisone shot    Thyroid disorder       PSH:  Past Surgical History:   Procedure Laterality Date    ENDOSCOPY, COLON, DIAGNOSTIC  11/2010    ([facility])-f/u 5 yrs.  HX BREAST LUMPECTOMY      right - benign    HX CHOLECYSTECTOMY      HX GI      hemorrhoidectomy    HX ORTHOPAEDIC      surgery for tibial plateau fx - took bone from hip/has hardware knee to ankle      Social History:  Social History     Tobacco Use    Smoking status: Never Smoker    Smokeless tobacco: Never Used   Substance Use Topics    Alcohol use:  Yes     Alcohol/week: 4.2 oz     Types: 7 Glasses of wine per week      Family History:  Family History   Problem Relation Age of Onset    Alcohol abuse Mother         cirrhosis    Alcohol abuse Father     Other Father         cerebral hemorrhage    Cancer Brother         prostate    Heart Disease Brother 46        MI(age 46), bypass(66)    Diabetes Maternal Aunt     Hypertension Maternal Aunt     Cancer Maternal Uncle         COLON    Heart Disease Maternal Grandmother         MI    Cancer Cousin         BREAST    Anesth Problems Neg Hx       Medications: (reviewed)  Current Facility-Administered Medications   Medication Dose Route Frequency    lactated Ringers infusion  100 mL/hr IntraVENous CONTINUOUS    0.9% sodium chloride infusion  25 mL/hr IntraVENous CONTINUOUS    sodium chloride (NS) flush 5-40 mL  5-40 mL IntraVENous Q8H    sodium chloride (NS) flush 5-40 mL  5-40 mL IntraVENous PRN    lidocaine (PF) (XYLOCAINE) 10 mg/mL (1 %) injection 0.1 mL  0.1 mL SubCUTAneous PRN    fentaNYL citrate (PF) injection 50 mcg  50 mcg IntraVENous PRN    midazolam (VERSED) injection 1 mg  1 mg IntraVENous PRN    midazolam (VERSED) injection 1 mg  1 mg IntraVENous PRN    ropivacaine (PF) (NAROPIN) 5 mg/mL (0.5 %) injection 150 mg  30 mL Intra artICUlar PRN     Allergies: (reviewed)  Allergies   Allergen Reactions    Adhesive Hives    Cephalexin Diarrhea    E-Mycin [Erythromycin] Other (comments)     GI upset    Sudafed [Pseudoephedrine Hcl] Palpitations          OBJECTIVE:    Physical Exam:  VITAL SIGNS: Vitals:    03/08/19 0651   BP: 154/58   Pulse: (!) 58   Resp: 17   Temp: 98.1 °F (36.7 °C)   SpO2: 100%   Weight: 151 lb (68.5 kg)   Height: 5' 4.75\" (1.645 m)     Body mass index is 25.32 kg/m². GENERAL DANIEL: Conversant, alert, oriented. No acute distress. HEENT: HEENT. No thyroid enlargement. No JVD. Neck: Supple without restrictions. RESPIRATORY: Clear to auscultation and percussion to the bases. No CVAT. CARDIOVASC: RRR without murmur/rub.    GASTROINT: soft, non-tender, without masses or organomegaly   MUSCULOSKEL: no joint tenderness, deformity or swelling   EXTREMITIES: extremities normal, atraumatic, no cyanosis or edema   PELVIC: Vulva and vagina appear normal. Normal cervix, but there was a small polyp protruding from the endocervical canal.  Bimanual exam reveals normal uterus and adnexa. RECTAL: Deferred   MARTIN SURVEY: No suspicious lymphadenopathy or edema noted. NEURO: Grossly intact. No acute deficit. Lab Data:    Lab Results   Component Value Date/Time    WBC 4.2 03/05/2019 12:14 PM    HGB 13.7 03/05/2019 12:14 PM    HCT 40.6 03/05/2019 12:14 PM    PLATELET 919 42/46/0895 12:14 PM    MCV 96.0 03/05/2019 12:14 PM     Lab Results   Component Value Date/Time    Sodium 140 03/05/2019 12:14 PM    Potassium 4.3 03/05/2019 12:14 PM    Chloride 104 03/05/2019 12:14 PM    CO2 27 03/05/2019 12:14 PM    Anion gap 9 03/05/2019 12:14 PM    Glucose 82 03/05/2019 12:14 PM    BUN 18 03/05/2019 12:14 PM    Creatinine 0.65 03/05/2019 12:14 PM    BUN/Creatinine ratio 28 (H) 03/05/2019 12:14 PM    GFR est AA >60 03/05/2019 12:14 PM    GFR est non-AA >60 03/05/2019 12:14 PM    Calcium 9.3 03/05/2019 12:14 PM     Lab Results   Component Value Date/Time    Cancer Ag (CA) 125 10.6 01/22/2019 04:25 PM           CT of abdomen/pelvis (12/4/18) [facility]  Lower chest:  Within normal limits. Vasculature:  Scattered atherosclerosis of the aortoiliac system without  aneurysm. Liver, gallbladder and biliary system:  Status post cholecystectomy.  The  liver has a normal appearance. Spleen:  Within normal limits. Pancreas:  Within normal limits. Adrenal glands: Within normal limits. Kidneys, ureters and urinary bladder:  No nephrolithiasis, hydronephrosis  or perinephric stranding.  In the interpolar region of the right  kidney is an oval-shaped 2.0 cm cyst.  No hydroureter.  Evaluation for  small punctate ureteral stones is limited by excreted contrast.  Urinary  bladder is within normal limits. GI tract:  Tiny hiatal hernia is present.  The stomach is otherwise normal.  There are several fluid-filled but nondilated loops of small bowel  throughout the abdomen and pelvis.  The appendix is tentatively identified.   No right lower quadrant inflammatory changes.  The cecum is somewhat  patulous and fluid-filled.  Mild amount of fluid within the proximal  ascending colon.  Moderate to large distal colonic and rectal stool burden. No colonic diverticuli. Reproductive organs: Within the right adnexa is a rounded 4.9 cm  hypodensity measuring fluid attenuation. Peritoneum, retroperitoneum and mesentery:  No bulky lympadenopathy.  No  suspicious free fluid.  No free air identified. Superficial soft tissues: Within normal limits. Skeletal structures:  There is asymmetric contour deformity of the superior  aspect of the right iliac crest and a small 9 mm lucent defect with overall  benign appearance.  Degenerative changes of the spine are present.  No  aggressive osseous lytic or blastic lesions. Result Impression   1. Several fluid-filled but nondilated loops of small bowel with moderate  amount of fluid within the cecum and proximal ascending colon.  Findings  likely represent enterocolitis. 2. No evidence of bowel obstruction, acute inflammatory process or  urolithiasis. 3. Tiny hiatal hernia. 4. Nearly 5 cm fluid density lesion within the right adnexa, likely  representing a cyst.  Routine outpatient pelvic ultrasound is recommended. 5. Moderate to large rectal stool burden which can be seen in the setting  of impaction. Pelvic ultrasound (1/16/19)  FINDINGS TRANSABDOMINAL:  The UTERUS MEASURES 6.6 x 2.6 x 4.1 cm. The central  endometrium measures 5mm in thickness. A small amount of fluid and heterogeneity  are noted in the endometrial canal..     There is no free fluid in the cul-de-sac.     The RIGHT OVARY measures 2.0 x 5.6 x 3.8cm . The LEFT OVARY measures 1.6 x 1.2 x  1.3 cm . The right ovary shows a dominant well-circumscribed cyst measuring 5.0  x 4.0 x 3.8 cm. Both ovaries show normal blood flow by color Doppler.  The left  ovary is normal. There is no free fluid in the cul-de-sac.        FINDINGS TRANSVAGINAL:  The uterus shows overall size of 6.1 x 2.7 x 3.9.. Small  heterogeneous fluid collections are noted in the endometrial canal which is  approximately 8 mm in thickness. The internal cervical os is closed.     Further evaluation of the ovaries transvaginally reveals the right ovary  measures 1.6 x 0.9 x 1.7 cm, exclusive of a large simple cyst, and the left  ovary measures 1.9 x 1.5 x 1.4 cm. . The right ovarian cyst measures 4.8 x 3.6 x  3.6 cm. Both ovaries show normal blood flow by color Doppler.     IMPRESSION:   1. Transabdominal pelvic ultrasound revealing large right ovarian cyst. Normal  left ovary. . Follow-up to clearing of the right ovarian cyst is recommended. Further evaluation of the current endometrial appearance is suggested. 2. Transvaginal pelvic ultrasound revealing right ovarian cyst 4.8 cm, for which  follow-up to clearing is recommended. Normal left ovary. Heterogeneous  endometrial canal, for which further evaluation is recommended. This appearance  may represent polyps, cysts or other indeterminate abnormality. Neoplasm is not  excluded. IMPRESSION/PLAN:  Gia Arzate is a 77 y.o. female with a working diagnosis of right ovarian cystic mass, as well as a thickened endometrium. I reviewed with Gia Arzate her medical records, physical exam, and review of symptoms. I reassured her that the cystic ovarian mass on the right was most likely benign, as it appeared simple, was not that large, and had normal doppler flow. The CA-125 was normal, which was also reassuring. I discussed options with her, including observation and surgical removal.  I felt that both were reasonable. She would like to avoid a big surgery if possible. I suggested that we just repeat an ultrasound in a couple of months to make sure that it remains stable in size and appearance. If things change, we can then consider surgical excision.   As for the thickened endometrium on ultrasound, I explained that in a postmenopausal woman this requires evaluation. I suggested a hysteroscopy/D&C to evaluate. This is an outpatient procedure and recovery would be minimal.  If malignancy is identified, we will then proceed with definitive surgery for both the uterus and the ovary. She was counseled on the risks, benefits, indications, and alternatives of surgery. Her questions were answered and she wishes to proceed as planned. Signed By: Kaleigh Scott MD     3/8/2019/3:13 PM     Date of Surgery Update:  Lauren Gilliam was seen and examined. History and physical has been reviewed. The patient has been examined.  There have been no significant clinical changes since the completion of the originally dated History and Physical.    Signed By: Kaleigh Scott MD     March 8, 2019 7:31 AM

## 2019-03-08 ENCOUNTER — HOSPITAL ENCOUNTER (OUTPATIENT)
Age: 69
Setting detail: OUTPATIENT SURGERY
Discharge: HOME | End: 2019-03-08
Attending: OBSTETRICS & GYNECOLOGY | Admitting: OBSTETRICS & GYNECOLOGY
Payer: MEDICARE

## 2019-03-08 ENCOUNTER — ANESTHESIA EVENT (OUTPATIENT)
Dept: SURGERY | Age: 69
End: 2019-03-08
Payer: MEDICARE

## 2019-03-08 ENCOUNTER — ANESTHESIA (OUTPATIENT)
Dept: SURGERY | Age: 69
End: 2019-03-08
Payer: MEDICARE

## 2019-03-08 VITALS
RESPIRATION RATE: 14 BRPM | WEIGHT: 151 LBS | SYSTOLIC BLOOD PRESSURE: 110 MMHG | TEMPERATURE: 97.3 F | HEIGHT: 65 IN | BODY MASS INDEX: 25.16 KG/M2 | HEART RATE: 56 BPM | DIASTOLIC BLOOD PRESSURE: 54 MMHG | OXYGEN SATURATION: 98 %

## 2019-03-08 PROBLEM — N84.1 ENDOCERVICAL POLYP: Status: ACTIVE | Noted: 2019-03-08

## 2019-03-08 PROBLEM — N84.0 ENDOMETRIAL POLYP: Status: ACTIVE | Noted: 2019-03-08

## 2019-03-08 PROCEDURE — 74011000250 HC RX REV CODE- 250

## 2019-03-08 PROCEDURE — 77030020268 HC MISC GENERAL SUPPLY: Performed by: OBSTETRICS & GYNECOLOGY

## 2019-03-08 PROCEDURE — 76210000020 HC REC RM PH II FIRST 0.5 HR: Performed by: OBSTETRICS & GYNECOLOGY

## 2019-03-08 PROCEDURE — 77030032490 HC SLV COMPR SCD KNE COVD -B: Performed by: OBSTETRICS & GYNECOLOGY

## 2019-03-08 PROCEDURE — 77030010509 HC AIRWY LMA MSK TELE -A: Performed by: ANESTHESIOLOGY

## 2019-03-08 PROCEDURE — 76010000138 HC OR TIME 0.5 TO 1 HR: Performed by: OBSTETRICS & GYNECOLOGY

## 2019-03-08 PROCEDURE — 76060000032 HC ANESTHESIA 0.5 TO 1 HR: Performed by: OBSTETRICS & GYNECOLOGY

## 2019-03-08 PROCEDURE — 77030026236 HC DEV TISS RMVL MYOSUR HOLO -G1: Performed by: OBSTETRICS & GYNECOLOGY

## 2019-03-08 PROCEDURE — 88305 TISSUE EXAM BY PATHOLOGIST: CPT

## 2019-03-08 PROCEDURE — 74011250636 HC RX REV CODE- 250/636

## 2019-03-08 PROCEDURE — 77030018836 HC SOL IRR NACL ICUM -A: Performed by: OBSTETRICS & GYNECOLOGY

## 2019-03-08 PROCEDURE — 77030005537 HC CATH URETH BARD -A: Performed by: OBSTETRICS & GYNECOLOGY

## 2019-03-08 PROCEDURE — 76210000006 HC OR PH I REC 0.5 TO 1 HR: Performed by: OBSTETRICS & GYNECOLOGY

## 2019-03-08 PROCEDURE — 74011250636 HC RX REV CODE- 250/636: Performed by: ANESTHESIOLOGY

## 2019-03-08 RX ORDER — LIDOCAINE HYDROCHLORIDE 10 MG/ML
0.1 INJECTION, SOLUTION EPIDURAL; INFILTRATION; INTRACAUDAL; PERINEURAL AS NEEDED
Status: DISCONTINUED | OUTPATIENT
Start: 2019-03-08 | End: 2019-03-08 | Stop reason: HOSPADM

## 2019-03-08 RX ORDER — ONDANSETRON 2 MG/ML
4 INJECTION INTRAMUSCULAR; INTRAVENOUS AS NEEDED
Status: DISCONTINUED | OUTPATIENT
Start: 2019-03-08 | End: 2019-03-08 | Stop reason: HOSPADM

## 2019-03-08 RX ORDER — DEXAMETHASONE SODIUM PHOSPHATE 4 MG/ML
INJECTION, SOLUTION INTRA-ARTICULAR; INTRALESIONAL; INTRAMUSCULAR; INTRAVENOUS; SOFT TISSUE AS NEEDED
Status: DISCONTINUED | OUTPATIENT
Start: 2019-03-08 | End: 2019-03-08 | Stop reason: HOSPADM

## 2019-03-08 RX ORDER — MIDAZOLAM HYDROCHLORIDE 1 MG/ML
0.5 INJECTION, SOLUTION INTRAMUSCULAR; INTRAVENOUS
Status: DISCONTINUED | OUTPATIENT
Start: 2019-03-08 | End: 2019-03-08 | Stop reason: HOSPADM

## 2019-03-08 RX ORDER — ROPIVACAINE HYDROCHLORIDE 5 MG/ML
30 INJECTION, SOLUTION EPIDURAL; INFILTRATION; PERINEURAL AS NEEDED
Status: DISCONTINUED | OUTPATIENT
Start: 2019-03-08 | End: 2019-03-08 | Stop reason: HOSPADM

## 2019-03-08 RX ORDER — MIDAZOLAM HYDROCHLORIDE 1 MG/ML
1 INJECTION, SOLUTION INTRAMUSCULAR; INTRAVENOUS AS NEEDED
Status: DISCONTINUED | OUTPATIENT
Start: 2019-03-08 | End: 2019-03-08 | Stop reason: HOSPADM

## 2019-03-08 RX ORDER — SODIUM CHLORIDE 0.9 % (FLUSH) 0.9 %
5-40 SYRINGE (ML) INJECTION AS NEEDED
Status: DISCONTINUED | OUTPATIENT
Start: 2019-03-08 | End: 2019-03-08 | Stop reason: HOSPADM

## 2019-03-08 RX ORDER — ACETAMINOPHEN 10 MG/ML
INJECTION, SOLUTION INTRAVENOUS AS NEEDED
Status: DISCONTINUED | OUTPATIENT
Start: 2019-03-08 | End: 2019-03-08 | Stop reason: HOSPADM

## 2019-03-08 RX ORDER — SODIUM CHLORIDE 0.9 % (FLUSH) 0.9 %
5-40 SYRINGE (ML) INJECTION EVERY 8 HOURS
Status: DISCONTINUED | OUTPATIENT
Start: 2019-03-08 | End: 2019-03-08 | Stop reason: HOSPADM

## 2019-03-08 RX ORDER — MORPHINE SULFATE 10 MG/ML
2 INJECTION, SOLUTION INTRAMUSCULAR; INTRAVENOUS
Status: DISCONTINUED | OUTPATIENT
Start: 2019-03-08 | End: 2019-03-08 | Stop reason: HOSPADM

## 2019-03-08 RX ORDER — FENTANYL CITRATE 50 UG/ML
25 INJECTION, SOLUTION INTRAMUSCULAR; INTRAVENOUS
Status: DISCONTINUED | OUTPATIENT
Start: 2019-03-08 | End: 2019-03-08 | Stop reason: HOSPADM

## 2019-03-08 RX ORDER — SODIUM CHLORIDE, SODIUM LACTATE, POTASSIUM CHLORIDE, CALCIUM CHLORIDE 600; 310; 30; 20 MG/100ML; MG/100ML; MG/100ML; MG/100ML
INJECTION, SOLUTION INTRAVENOUS
Status: DISCONTINUED | OUTPATIENT
Start: 2019-03-08 | End: 2019-03-08 | Stop reason: HOSPADM

## 2019-03-08 RX ORDER — MIDAZOLAM HYDROCHLORIDE 1 MG/ML
INJECTION, SOLUTION INTRAMUSCULAR; INTRAVENOUS AS NEEDED
Status: DISCONTINUED | OUTPATIENT
Start: 2019-03-08 | End: 2019-03-08 | Stop reason: HOSPADM

## 2019-03-08 RX ORDER — ONDANSETRON 2 MG/ML
INJECTION INTRAMUSCULAR; INTRAVENOUS AS NEEDED
Status: DISCONTINUED | OUTPATIENT
Start: 2019-03-08 | End: 2019-03-08 | Stop reason: HOSPADM

## 2019-03-08 RX ORDER — DEXMEDETOMIDINE HYDROCHLORIDE 4 UG/ML
INJECTION, SOLUTION INTRAVENOUS AS NEEDED
Status: DISCONTINUED | OUTPATIENT
Start: 2019-03-08 | End: 2019-03-08 | Stop reason: HOSPADM

## 2019-03-08 RX ORDER — PROPOFOL 10 MG/ML
INJECTION, EMULSION INTRAVENOUS
Status: DISCONTINUED | OUTPATIENT
Start: 2019-03-08 | End: 2019-03-08 | Stop reason: HOSPADM

## 2019-03-08 RX ORDER — PROPOFOL 10 MG/ML
INJECTION, EMULSION INTRAVENOUS AS NEEDED
Status: DISCONTINUED | OUTPATIENT
Start: 2019-03-08 | End: 2019-03-08 | Stop reason: HOSPADM

## 2019-03-08 RX ORDER — SODIUM CHLORIDE, SODIUM LACTATE, POTASSIUM CHLORIDE, CALCIUM CHLORIDE 600; 310; 30; 20 MG/100ML; MG/100ML; MG/100ML; MG/100ML
100 INJECTION, SOLUTION INTRAVENOUS CONTINUOUS
Status: DISCONTINUED | OUTPATIENT
Start: 2019-03-08 | End: 2019-03-08 | Stop reason: HOSPADM

## 2019-03-08 RX ORDER — SODIUM CHLORIDE 9 MG/ML
25 INJECTION, SOLUTION INTRAVENOUS CONTINUOUS
Status: DISCONTINUED | OUTPATIENT
Start: 2019-03-08 | End: 2019-03-08 | Stop reason: HOSPADM

## 2019-03-08 RX ORDER — DIPHENHYDRAMINE HYDROCHLORIDE 50 MG/ML
12.5 INJECTION, SOLUTION INTRAMUSCULAR; INTRAVENOUS AS NEEDED
Status: DISCONTINUED | OUTPATIENT
Start: 2019-03-08 | End: 2019-03-08 | Stop reason: HOSPADM

## 2019-03-08 RX ORDER — LIDOCAINE HYDROCHLORIDE 20 MG/ML
INJECTION, SOLUTION EPIDURAL; INFILTRATION; INTRACAUDAL; PERINEURAL AS NEEDED
Status: DISCONTINUED | OUTPATIENT
Start: 2019-03-08 | End: 2019-03-08 | Stop reason: HOSPADM

## 2019-03-08 RX ORDER — FENTANYL CITRATE 50 UG/ML
50 INJECTION, SOLUTION INTRAMUSCULAR; INTRAVENOUS AS NEEDED
Status: DISCONTINUED | OUTPATIENT
Start: 2019-03-08 | End: 2019-03-08 | Stop reason: HOSPADM

## 2019-03-08 RX ORDER — HYDROMORPHONE HYDROCHLORIDE 1 MG/ML
0.5 INJECTION, SOLUTION INTRAMUSCULAR; INTRAVENOUS; SUBCUTANEOUS
Status: DISCONTINUED | OUTPATIENT
Start: 2019-03-08 | End: 2019-03-08 | Stop reason: HOSPADM

## 2019-03-08 RX ADMIN — DEXMEDETOMIDINE HYDROCHLORIDE 4 MCG: 4 INJECTION, SOLUTION INTRAVENOUS at 07:50

## 2019-03-08 RX ADMIN — LIDOCAINE HYDROCHLORIDE 80 MG: 20 INJECTION, SOLUTION EPIDURAL; INFILTRATION; INTRACAUDAL; PERINEURAL at 07:35

## 2019-03-08 RX ADMIN — ONDANSETRON 4 MG: 2 INJECTION INTRAMUSCULAR; INTRAVENOUS at 07:36

## 2019-03-08 RX ADMIN — MIDAZOLAM HYDROCHLORIDE 2 MG: 1 INJECTION, SOLUTION INTRAMUSCULAR; INTRAVENOUS at 07:26

## 2019-03-08 RX ADMIN — FENTANYL CITRATE 25 MCG: 50 INJECTION INTRAMUSCULAR; INTRAVENOUS at 08:26

## 2019-03-08 RX ADMIN — DEXMEDETOMIDINE HYDROCHLORIDE 4 MCG: 4 INJECTION, SOLUTION INTRAVENOUS at 07:54

## 2019-03-08 RX ADMIN — PROPOFOL 150 MG: 10 INJECTION, EMULSION INTRAVENOUS at 07:35

## 2019-03-08 RX ADMIN — ACETAMINOPHEN 1000 MG: 10 INJECTION, SOLUTION INTRAVENOUS at 07:42

## 2019-03-08 RX ADMIN — ONDANSETRON 4 MG: 2 INJECTION INTRAMUSCULAR; INTRAVENOUS at 08:26

## 2019-03-08 RX ADMIN — PROPOFOL 100 MCG/KG/MIN: 10 INJECTION, EMULSION INTRAVENOUS at 07:37

## 2019-03-08 RX ADMIN — SODIUM CHLORIDE, SODIUM LACTATE, POTASSIUM CHLORIDE, CALCIUM CHLORIDE: 600; 310; 30; 20 INJECTION, SOLUTION INTRAVENOUS at 07:26

## 2019-03-08 RX ADMIN — DEXAMETHASONE SODIUM PHOSPHATE 8 MG: 4 INJECTION, SOLUTION INTRA-ARTICULAR; INTRALESIONAL; INTRAMUSCULAR; INTRAVENOUS; SOFT TISSUE at 07:36

## 2019-03-08 RX ADMIN — DEXMEDETOMIDINE HYDROCHLORIDE 4 MCG: 4 INJECTION, SOLUTION INTRAVENOUS at 08:02

## 2019-03-08 RX ADMIN — FENTANYL CITRATE 25 MCG: 50 INJECTION INTRAMUSCULAR; INTRAVENOUS at 08:42

## 2019-03-08 NOTE — BRIEF OP NOTE
BRIEF OPERATIVE NOTE    Date of Procedure: 3/8/2019   Preoperative Diagnosis: THICKENED ENDOMETRIUM  Postoperative Diagnosis: THICKENED ENDOMETRIUM    Procedure(s): HYSTEROSCOPY, DILATATION AND CURETTAGE WITH MYOSURE  Surgeon(s) and Role:     Santos Sepulveda MD - Primary         Surgical Assistant: None    Surgical Staff:  Circ-1: Liliya Mays RN  Circ-Intern: Taco SON  Scrub RN-1: Fritz Fuchs RN  Event Time In Time Out   Incision Start 0646    Incision Close 0803      Anesthesia: General   Estimated Blood Loss: 5 cc  Specimens: * No specimens in log *   Findings: Large endometrial polyp arising from superior and lateral aspect of the right uterine wall, near the right tubal os. Smaller sessile polyp near the left tubal os. Multiple small cervical polyps. Nothing suspicious for malignancy.    Complications: None  Implants: * No implants in log *      Michael Byers MD

## 2019-03-08 NOTE — DISCHARGE INSTRUCTIONS
Patient Education        Dilation and Curettage: What to Expect at Home  Your Recovery  Dilation and curettage (D&C) is a procedure to remove tissue from the inside of the uterus. The doctor used a curved tool, called a curette, to gently scrape tissue from your uterus. You are likely to have a backache, or cramps similar to menstrual cramps, and pass small clots of blood from your vagina for the first few days. You may continue to have light vaginal bleeding for several weeks after the procedure. You will probably be able to go back to most of your normal activities in 1 or 2 days. This care sheet gives you a general idea about how long it will take for you to recover. But each person recovers at a different pace. Follow the steps below to get better as quickly as possible. How can you care for yourself at home? Activity    · Rest when you feel tired. Getting enough sleep will help you recover.     · Avoid strenuous activities, such as bicycle riding, jogging, weight lifting, or aerobic exercise, until your doctor says it is okay.     · Most women are able to return to work the day after the procedure.     · You may have some light vaginal bleeding. Wear sanitary pads if needed. Do not douche or use tampons for 2 weeks or until your doctor says it is okay.     · Ask your doctor when it is okay for you to have sex.     · If you could become pregnant, talk about birth control with your doctor. Do not try to become pregnant until your doctor says it is okay. Diet    · You can eat your normal diet. If your stomach is upset, try bland, low-fat foods like plain rice, broiled chicken, toast, and yogurt.     · Drink plenty of fluids (unless your doctor tells you not to). Medicines    · Your doctor will tell you if and when you can restart your medicines.  He or she will also give you instructions about taking any new medicines.     · If you take blood thinners, such as warfarin (Coumadin), clopidogrel (Plavix), or aspirin, be sure to talk to your doctor. He or she will tell you if and when to start taking those medicines again. Make sure that you understand exactly what your doctor wants you to do.     · Be safe with medicines. Take pain medicines exactly as directed. ? If the doctor gave you a prescription medicine for pain, take it as prescribed. ? If you are not taking a prescription pain medicine, ask your doctor if you can take an over-the-counter medicine.     · If you think your pain medicine is making you sick to your stomach:  ? Take your medicine after meals (unless your doctor has told you not to). ? Ask your doctor for a different pain medicine.     · If your doctor prescribed antibiotics, take them as directed. Do not stop taking them just because you feel better. You need to take the full course of antibiotics. Follow-up care is a key part of your treatment and safety. Be sure to make and go to all appointments, and call your doctor if you are having problems. It's also a good idea to know your test results and keep a list of the medicines you take. When should you call for help? Call 911 anytime you think you may need emergency care. For example, call if:    · You passed out (lost consciousness).     · You have chest pain, are short of breath, or cough up blood.    Call your doctor now or seek immediate medical care if:    · You have bright red vaginal bleeding that soaks one or more pads in an hour, or you have large clots.     · You have vaginal discharge that increases in amount or smells bad.     · You are sick to your stomach or cannot drink fluids.     · You have pain that does not get better after you take pain medicine.     · You cannot pass stools or gas.     · You have symptoms of a blood clot in your leg (called a deep vein thrombosis), such as:  ? Pain in your calf, back of the knee, thigh, or groin. ?  Redness and swelling in your leg.     · You have signs of infection, such as:  ? Increased pain, swelling, warmth, or redness. ? Red streaks leading from the area. ? Pus draining from the area. ? A fever.    Watch closely for changes in your health, and be sure to contact your doctor if you have any problems. Where can you learn more? Go to http://radha-nhi.info/. Enter [phone] in the search box to learn more about \"Dilation and Curettage: What to Expect at Home. \"  Current as of: September 5, 2018  Content Version: 11.9  © 5686-9245 [employer]. Care instructions adapted under license by [employer] (which disclaims liability or warranty for this information). If you have questions about a medical condition or this instruction, always ask your healthcare professional. [place] [address] any warranty or liability for your use of this information. DISCHARGE SUMMARY from Nurse    PATIENT INSTRUCTIONS:    After general anesthesia or intravenous sedation, for 24 hours or while taking prescription Narcotics:  · Limit your activities  · Do not drive and operate hazardous machinery  · Do not make important personal or business decisions  · Do  not drink alcoholic beverages  · If you have not urinated within 8 hours after discharge, please contact your surgeon on call. Report the following to your surgeon:  · Excessive pain, swelling, redness or odor of or around the surgical area  · Temperature over 100.5  · Nausea and vomiting lasting longer than 4 hours or if unable to take medications  · Any signs of decreased circulation or nerve impairment to extremity: change in color, persistent  numbness, tingling, coldness or increase pain  · Any questions  The discharge information has been reviewed with the {PATIENT PARENT GUARDIAN:70970}. The {PATIENT PARENT GUARDIAN:96715} verbalized understanding.   Discharge medications reviewed with the {Dishcarge meds reviewed BYFI:43617} and appropriate educational materials and side effects teaching were provided.   ___________________________________________________________________________________________________________________________________

## 2019-03-08 NOTE — OP NOTES
Gynecologic Oncology Operative Report    Selvin Holbrook  3/8/2019    Pre-operative dx: Thickened endometrium    Post-operative dx: Thickened endometrium    Procedure: 1) Hysteroscopy    2) Dilatation and curettage    Surgeon:  Milton Brewster MD    Assistant:  None     Anesthesia:  GETA    EBL:  5 cc    Fluid deficit:  247 cc    Complications:  None    Implants:  None    Specimens:      Operative indications:  78 yo WF initially referred to me for an ovarian cyst.  The cyst appeared simple and was felt to be most likely benign. Her CA-125 was normal.  We elected to follow that conservatively. On the ultrasound she also had a thickened endometrium. I recommended hysteroscopy/D&C to evaluate. Operative findings:  Large endometrial polyp arising from superior and lateral aspect of the right uterine wall, near the right tubal os. Smaller sessile polyp near the left tubal os. Multiple small cervical polyps. Nothing suspicious for malignancy. Procedure in detail:  After the risks, benefits, indications and alternatives of the procedure were discussed with the patient and informed consent was obtained, the patient was taken to the operating room. She was identified  and then administered general anesthesia and placed in the dorsal lithotomy position in [facility] and prepped and draped in the usual fashion. An open-sided Graves speculum was placed into the vagina to visualize the cervix. The cervix was then grasped with a single-tooth tenaculum. The uterus was sounded to determine the size of the uterine cavity. It was then progressively dilated with Hanks dilators to accommodate the Myosure hysteroscope. The hysteroscope was then inserted, and the uterine cavity was insufflated with normal saline. The above mentioned findings were noted. A directed curettage of the endometrial cavity and endocervical canal was then performed. The polyps appeared to be removed completely.   Minimal bleeding was noted at this time. The single-tooth tenaculum was removed and the weighted speculum was removed. The patient was then taken out of stirrups, awakened from anesthesia, and taken to the recovery room in stable condition. All sponge, lap, needle counts were correct.        Magda Yan MD  3/8/2019  8:08 AM

## 2019-03-08 NOTE — PERIOP NOTES
Patient: Ioana Beth MRN: [id number]  SSN: [id number]   YOB: 1950  Age: 77 y.o. Sex: female     Patient is status post Procedure(s): HYSTEROSCOPY, DILATATION AND CURETTAGE WITH MYOSURE. Surgeon(s) and Role:     Shabbir Jaime MD - Primary                      Peripheral IV 03/08/19 Left Hand (Active)   Site Assessment Clean, dry, & intact 3/8/2019  6:57 AM   Phlebitis Assessment 0 3/8/2019  6:57 AM   Infiltration Assessment 0 3/8/2019  6:57 AM   Dressing Status New 3/8/2019  6:57 AM   Dressing Type Transparent 3/8/2019  6:57 AM   Hub Color/Line Status Pink; Infusing 3/8/2019  6:57 AM                           Dressing/Packing:  Wound Vagina-Dressing Type : Nallely-pad (03/08/19 0700)

## 2019-03-08 NOTE — ANESTHESIA PREPROCEDURE EVALUATION
Anesthetic History   No history of anesthetic complications  PONV          Review of Systems / Medical History  Patient summary reviewed, nursing notes reviewed and pertinent labs reviewed    Pulmonary  Within defined limits                 Neuro/Psych   Within defined limits           Cardiovascular  Within defined limits  Hypertension              Exercise tolerance: >4 METS     GI/Hepatic/Renal  Within defined limits   GERD           Endo/Other  Within defined limits    Hypothyroidism  Arthritis and cancer     Other Findings              Physical Exam    Airway  Mallampati: II  TM Distance: > 6 cm  Neck ROM: normal range of motion   Mouth opening: Normal     Cardiovascular  Regular rate and rhythm,  S1 and S2 normal,  no murmur, click, rub, or gallop             Dental  No notable dental hx       Pulmonary  Breath sounds clear to auscultation               Abdominal  GI exam deferred       Other Findings            Anesthetic Plan    ASA: 3  Anesthesia type: general and total IV anesthesia

## 2019-03-08 NOTE — PERIOP NOTES
Patient interviewed in holding area by CATHIE No RN, identity and procedure verified. 3000 ml 0.9% NaCl as irrigation  235 ml fluid deficit noted. Dr Lopez Real aware.

## 2019-03-08 NOTE — ANESTHESIA POSTPROCEDURE EVALUATION
Procedure(s): HYSTEROSCOPY, DILATATION AND CURETTAGE WITH MYOSURE. Anesthesia Post Evaluation        Patient location during evaluation: PACU  Note status: Adequate. Level of consciousness: responsive to verbal stimuli and sleepy but conscious  Pain management: satisfactory to patient  Airway patency: patent  Anesthetic complications: no  Cardiovascular status: acceptable  Respiratory status: acceptable  Hydration status: acceptable  Comments: +Post-Anesthesia Evaluation and Assessment    Patient: Mojgan Cotton MRN: [id number]  SSN: [id number]   YOB: 1950  Age: 77 y.o. Sex: female      Cardiovascular Function/Vital Signs    /48   Pulse (!) 49   Temp 35.9 °C (96.7 °F)   Resp 12   Ht 5' 4.75\" (1.645 m)   Wt 68.5 kg (151 lb)   SpO2 97%   BMI 25.32 kg/m²     Patient is status post Procedure(s): HYSTEROSCOPY, DILATATION AND CURETTAGE WITH MYOSURE. Nausea/Vomiting: Controlled. Postoperative hydration reviewed and adequate. Pain:  Pain Scale 1: Numeric (0 - 10) (03/08/19 0843)  Pain Intensity 1: 5 (03/08/19 0843)   Managed. Neurological Status:   Neuro (WDL): Within Defined Limits (03/08/19 0843)   At baseline. Mental Status and Level of Consciousness: Arousable. Pulmonary Status:   O2 Device: Room air (03/08/19 0844)   Adequate oxygenation and airway patent. Complications related to anesthesia: None    Post-anesthesia assessment completed. No concerns.     Signed By: Leslye Larry MD    3/8/2019  Post anesthesia nausea and vomiting:  controlled      Visit Vitals  /48   Pulse (!) 49   Temp 35.9 °C (96.7 °F)   Resp 12   Ht 5' 4.75\" (1.645 m)   Wt 68.5 kg (151 lb)   SpO2 97%   BMI 25.32 kg/m²

## 2019-03-15 ENCOUNTER — OFFICE VISIT (OUTPATIENT)
Dept: CARDIOLOGY CLINIC | Age: 69
End: 2019-03-15

## 2019-03-15 VITALS
HEART RATE: 64 BPM | BODY MASS INDEX: 25.33 KG/M2 | RESPIRATION RATE: 16 BRPM | WEIGHT: 152 LBS | SYSTOLIC BLOOD PRESSURE: 124 MMHG | DIASTOLIC BLOOD PRESSURE: 66 MMHG | OXYGEN SATURATION: 98 % | HEIGHT: 65 IN

## 2019-03-15 DIAGNOSIS — R93.1 AGATSTON CAC SCORE 100-199: ICD-10-CM

## 2019-03-15 DIAGNOSIS — I10 ESSENTIAL HYPERTENSION: Primary | ICD-10-CM

## 2019-03-15 DIAGNOSIS — E78.2 MIXED HYPERLIPIDEMIA: ICD-10-CM

## 2019-03-15 NOTE — PROGRESS NOTES
Identified pt with two pt identifiers(name and ). Reviewed record in preparation for visit and have obtained necessary documentation. No chief complaint on file. Health Maintenance Due   Topic    MEDICARE YEARLY EXAM        Coordination of Care Questionnaire:  :   1) Have you been to an emergency room, urgent care, or hospitalized since your last visit? If yes, where when, and reason for visit? no       2. Have seen or consulted any other health care provider since your last visit? If yes, where when, and reason for visit? YES  - Dr. Leeann Gilford for tendon release (ortho)  - Dr. Buck Viveros for D&C (GYN)    3) Do you have an Advanced Directive/ Living Will in place? YES  If yes, do we have a copy on file YES  If no, would you like information NO    Patient is accompanied by self I have received verbal consent from Mirlande Carvajal to discuss any/all medical information while they are present in the room.

## 2019-03-15 NOTE — PROGRESS NOTES
HISTORY OF PRESENT ILLNESS  Alissa Jacobs is a 77 y.o. female     SUMMARY:   Problem List  Date Reviewed: 3/13/2019          Codes Class Noted    Endometrial polyp ICD-10-CM: N84.0  ICD-9-CM: 621.0  3/8/2019        Endocervical polyp ICD-10-CM: N84.1  ICD-9-CM: 622.7  3/8/2019        Mass of right ovary ICD-10-CM: N83.9  ICD-9-CM: 620.9  1/22/2019        Thickened endometrium ICD-10-CM: R93.89  ICD-9-CM: 793.5  1/22/2019        Advance directive discussed with patient ICD-10-CM: Z71.89  ICD-9-CM: V65.49  3/13/2018    Overview Signed 3/13/2018 11:13 AM by Ariadna Jorgensen MD     3/13/2018 - -patient has an established AD. Will bring copy in for chart. 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 Polyps removed. Repeat done 12/2016, polyps removed; follow-up in 3 years.              History of screening mammography (Chronic) ICD-10-CM: Z92.89  ICD-9-CM: V15.89  7/10/2012    Overview Addendum 3/13/2018 11:09 AM by Ariadna Jorgensen MD     [facility], 6/2017             Pap smear for cervical cancer screening (Chronic) ICD-10-CM: Z12.4  ICD-9-CM: V76.2  7/10/2012    Overview Addendum 3/10/2017 11:02 AM by Ariadna Jorgensen MD     6/16, [facility] Hypothyroid ICD-10-CM: E03.9  ICD-9-CM: 244.9  12/7/2011        Depression ICD-10-CM: F32.9  ICD-9-CM: 723  12/7/2011        HTN (hypertension) ICD-10-CM: I10  ICD-9-CM: 401.9  12/7/2011        Fibrocystic breast ICD-10-CM: N60.19  ICD-9-CM: 610.1  12/7/2011        S/p tibial fracture ICD-10-CM: Z87.81  ICD-9-CM: V54.16  12/7/2011    Overview Signed 12/7/2011  2:17 PM by Pranav Bourgeois MD     Right with venkatesh placement, 1995             S/P hemorrhoidectomy ICD-10-CM: S54.674, Z87.19  ICD-9-CM: V45.89  12/7/2011    Overview Signed 12/7/2011  2:18 PM by MD Dr. Mame Che             Environmental allergies ICD-10-CM: Z91.09  ICD-9-CM: V15.09  12/7/2011        S/P laparoscopic cholecystectomy ICD-10-CM: M13.14  ICD-9-CM: V45.89  12/7/2011        Hiatal hernia ICD-10-CM: K44.9  ICD-9-CM: 553.3  12/7/2011    Overview Signed 12/7/2011  2:18 PM by Pranav Bourgeois MD     egd 11/9/10, Dr. Isaiah Zapata Medications on File Prior to Visit   Medication Sig    FLUoxetine (PROZAC) 20 mg capsule TAKE 1 CAPSULE DAILY    simvastatin (ZOCOR) 40 mg tablet TAKE 1 TABLET NIGHTLY    SYNTHROID 100 mcg tablet TAKE 1 TABLET DAILY BEFORE BREAKFAST    coenzyme q10 300 mg cap Take 300 mg by mouth daily.  LACTOBACILLUS ACIDOPHILUS (PROBIOTIC PO) Take 1 Cap by mouth daily.  BOSWELLIA SURENDRA EXTRACT (BOSWELLIA SURENDRA XT, BULK,) Take  by mouth daily. Anti-inflammatory supplement. Takes one cap po daily.  MULTIVITAMIN PO Take  by mouth daily. Takes one po daily.  raNITIdine (ZANTAC) 300 mg tab TAKE 1 TABLET DAILY (Patient taking differently: TAKE 1 TABLET DAILY AS NEEDED)     No current facility-administered medications on file prior to visit.         CARDIOLOGY STUDIES TO DATE:  12/11 normal stress echo at [facility] (notes reviewed and summarized under media tab)    5/16 score 188, 80th percentile  6/17 normal stress echo    Chief Complaint   Patient presents with    Abnormal EKG     HPI Breanna Alex Ms. Rowdy Alfaro comes in early for a visit because of concerns about a preop EKG that was done. I reviewed the tracing. She has LVH by volts, sinus bradycardia, but no significant abnormalities. She continues to exercise intermittently without any worrisome cardiac symptoms. Dr. Annette Ann is following her lipids and her blood work in November looked great. CARDIAC ROS:   negative for chest pain, dyspnea, palpitations, syncope, orthopnea, paroxysmal nocturnal dyspnea, exertional chest pressure/discomfort, claudication, lower extremity edema    Family History   Problem Relation Age of Onset    Alcohol abuse Mother         cirrhosis    Alcohol abuse Father     Other Father         cerebral hemorrhage    Cancer Brother         prostate    Heart Disease Brother 46        MI(age 46), bypass(66)    Diabetes Maternal Aunt     Hypertension Maternal Aunt     Cancer Maternal Uncle         COLON    Heart Disease Maternal Grandmother         MI    Cancer Cousin         BREAST    Anesth Problems Neg Hx        Past Medical History:   Diagnosis Date    Arthritis     lower back    Cancer ([facility] [address]     BCCAs removed - 6 on legs    Depression     Environmental allergies 12/7/2011    Fibrocystic breast 12/7/2011    GERD (gastroesophageal reflux disease)     Hiatal hernia 12/7/2011    HIGH BLOOD PRESSURE     not on meds    HTN (hypertension) 12/7/2011    Hypothyroid 12/7/2011    Nausea & vomiting     S/P hemorrhoidectomy 12/7/2011    S/P laparoscopic cholecystectomy 12/7/2011    S/p tibial fracture 12/7/2011    Tendonitis of wrist, left 12/2017    cortisone shot    Thyroid disorder        GENERAL ROS:  A comprehensive review of systems was negative except for that written in the HPI.     Visit Vitals  /66 (BP 1 Location: Left arm, BP Patient Position: Sitting)   Pulse 64   Resp 16   Ht 5' 4.75\" (1.645 m)   Wt 152 lb (68.9 kg)   SpO2 98%   BMI 25.49 kg/m²       Wt Readings from Last 3 Encounters:   03/15/19 152 lb (68.9 kg)   03/08/19 151 lb (68.5 kg)   03/05/19 151 lb (68.5 kg)            BP Readings from Last 3 Encounters:   03/15/19 124/66   03/08/19 110/54   03/05/19 162/77       PHYSICAL EXAM  General appearance: alert, cooperative, no distress, appears stated age  Neurologic: Alert and oriented X 3  Neck: supple, symmetrical, trachea midline, no adenopathy, no carotid bruit and no JVD  Lungs: clear to auscultation bilaterally  Heart: regular rate and rhythm, S1, S2 normal, no murmur, click, rub or gallop  Extremities: extremities normal, atraumatic, no cyanosis or edema    Lab Results   Component Value Date/Time    Cholesterol, total 180 11/02/2018 10:30 AM    Cholesterol, total 194 03/13/2018 11:31 AM    Cholesterol, total 184 09/11/2017 12:00 AM    Cholesterol, total 164 03/10/2017 11:08 AM    Cholesterol, total 167 09/09/2016 11:29 AM    HDL Cholesterol 63 11/02/2018 10:30 AM    HDL Cholesterol 66 03/13/2018 11:31 AM    HDL Cholesterol 72 09/11/2017 12:00 AM    HDL Cholesterol 73 03/10/2017 11:08 AM    HDL Cholesterol 71 09/09/2016 11:29 AM    LDL, calculated 94 11/02/2018 10:30 AM    LDL, calculated 96 03/13/2018 11:31 AM    LDL, calculated 87 09/11/2017 12:00 AM    LDL, calculated 70 03/10/2017 11:08 AM    LDL, calculated 68 09/09/2016 11:29 AM    Triglyceride 114 11/02/2018 10:30 AM    Triglyceride 158 (H) 03/13/2018 11:31 AM    Triglyceride 123 09/11/2017 12:00 AM    Triglyceride 104 03/10/2017 11:08 AM    Triglyceride 139 09/09/2016 11:29 AM    CHOL/HDL Ratio 2.4 07/15/2010 11:10 AM     ASSESSMENT  Ms. Lui Ramp EKG abnormalities are minor and of no clinical significance and I reassured her in that regard.   She will keep her appointment with us in June, at which time we are going to repeat her stress echocardiogram.           current treatment plan is effective, no change in therapy  lab results and schedule of future lab studies reviewed with patient  reviewed diet, exercise and weight control    Encounter Diagnoses   Name Primary?  Essential hypertension Yes    Mixed hyperlipidemia     Agatston CAC score 100-199      No orders of the defined types were placed in this encounter. Follow-up Disposition:  Return in about 6 months (around 9/15/2019).     Binh Brody MD  3/15/2019

## 2019-03-26 ENCOUNTER — OFFICE VISIT (OUTPATIENT)
Dept: GYNECOLOGY | Age: 69
End: 2019-03-26

## 2019-03-26 VITALS
SYSTOLIC BLOOD PRESSURE: 155 MMHG | BODY MASS INDEX: 25.33 KG/M2 | HEART RATE: 55 BPM | WEIGHT: 152 LBS | HEIGHT: 65 IN | DIASTOLIC BLOOD PRESSURE: 59 MMHG

## 2019-03-26 DIAGNOSIS — N83.8 MASS OF RIGHT OVARY: Primary | ICD-10-CM

## 2019-03-26 NOTE — PROGRESS NOTES
[address] [place] [address] [address], [address]  P [phone]  F [phone]    Office Note  Patient ID:  Name:  Lauren Gilliam  MRN:  [id number]  :  1950/68 y.o. Date:  3/26/2019      HISTORY OF PRESENT ILLNESS:  Lauren Gilliam is a 77 y.o.  postmenopausal female who was referred to me for a right ovarian mass by Dr. Cosmo Habermann. She was seen in the ER at [facility] in 2018 with abdominal pain, nausea/vomiting, and diarrhea. A CT scan suggested possible enterocolitis, but no obstruction. She was noted to have an incidental findings of a 4.9 cm right ovarian cyst.  She subsequently followed up with Dr. Zeeshan Figueroa.  She was sent for a pelvic ultrasound to better evaluated the ovarian cyst.  On the ultrasound it measured 4.8 cm. It was simple and well-circumscribed. Doppler flow was normal.  The contralateral ovary appeared normal.  There was no free fluid in the cul de sac. The endometrium, however, was a bit thickened, measuring 8 mm. She denies any vaginal bleeding or discharge. Her abdominal symptoms have now resolved. She denies a family history of gynecologic malignancy. I reassured her that the cystic ovarian mass on the right was most likely benign, as it appeared simple, was not that large, and had normal doppler flow. I discussed options with her, including observation and surgical removal.  I felt that both were reasonable. She would like to avoid a big surgery if possible. I suggested that we just repeat an ultrasound if a couple of months to make sure that it remains stable in size and appearance. If things change, we can then consider surgical excision. As for the thickened endometrium on ultrasound, I explained that in a postmenopausal woman this requires evaluation. I suggested a hysteroscopy/D&C to evaluate. This was performed on 3/8/19.      Operative findings:  Large endometrial polyp arising from superior and lateral aspect of the right uterine wall, near the right tubal os. Smaller sessile polyp near the left tubal os. Multiple small cervical polyps. Nothing suspicious for malignancy. FINAL PATHOLOGIC DIAGNOSIS   Endometrium, curettage:    Multiple fragments of benign endocervical and endometrial polypoid mucosa. She presents today for her postoperative check. She is doing well and is without complaints. ROS:   and GI review:  Negative  Cardiopulmonary review:  Negative   Musculoskeletal:  Negative    A comprehensive review of systems was negative except for that written in the History of Present Illness. , 10 point ROS      OB/GYN ROS:  There is no history of significant gyn problems or procedures. Patient denies any abnormal bleeding or vaginal discharge.        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    Hyperlipidemia 01/10/2013    Colon cancer screening 07/10/2012    History of screening mammography 07/10/2012    Pap smear for cervical cancer screening 07/10/2012    Hypothyroid 12/07/2011    Depression 12/07/2011    HTN (hypertension) 12/07/2011    Fibrocystic breast 12/07/2011    S/p tibial fracture 12/07/2011    S/P hemorrhoidectomy 12/07/2011    Environmental allergies 12/07/2011    S/P laparoscopic cholecystectomy 12/07/2011    Hiatal hernia 12/07/2011     PMH:  Past Medical History:   Diagnosis Date    Arthritis     lower back    Cancer ([address]     BCCAs removed - 6 on legs    Depression     Environmental allergies 12/7/2011    Fibrocystic breast 12/7/2011    GERD (gastroesophageal reflux disease)     Hiatal hernia 12/7/2011    HIGH BLOOD PRESSURE     not on meds    HTN (hypertension) 12/7/2011    Hypothyroid 12/7/2011    Nausea & vomiting     S/P hemorrhoidectomy 12/7/2011    S/P laparoscopic cholecystectomy 12/7/2011    S/p tibial fracture 12/7/2011    Tendonitis of wrist, left 12/2017    cortisone shot    Thyroid disorder       PSH:  Past Surgical History:   Procedure Laterality Date    ENDOSCOPY, COLON, DIAGNOSTIC  11/2010    ([facility])-f/u 5 yrs.  HX BREAST LUMPECTOMY      right - benign    HX CHOLECYSTECTOMY      HX GI      hemorrhoidectomy    HX ORTHOPAEDIC      surgery for tibial plateau fx - took bone from hip/has hardware knee to ankle      Social History:  Social History     Tobacco Use    Smoking status: Never Smoker    Smokeless tobacco: Never Used   Substance Use Topics    Alcohol use: Yes     Alcohol/week: 4.2 oz     Types: 7 Glasses of wine per week      Family History:  Family History   Problem Relation Age of Onset    Alcohol abuse Mother         cirrhosis    Alcohol abuse Father     Other Father         cerebral hemorrhage    Cancer Brother         prostate    Heart Disease Brother 46        MI(age 46), bypass(66)    Diabetes Maternal Aunt     Hypertension Maternal Aunt     Cancer Maternal Uncle         COLON    Heart Disease Maternal Grandmother         MI    Cancer Cousin         BREAST    Anesth Problems Neg Hx       Medications: (reviewed)  Current Outpatient Medications   Medication Sig    FLUoxetine (PROZAC) 20 mg capsule TAKE 1 CAPSULE DAILY    raNITIdine (ZANTAC) 300 mg tab TAKE 1 TABLET DAILY (Patient taking differently: TAKE 1 TABLET DAILY AS NEEDED)    simvastatin (ZOCOR) 40 mg tablet TAKE 1 TABLET NIGHTLY    SYNTHROID 100 mcg tablet TAKE 1 TABLET DAILY BEFORE BREAKFAST    coenzyme q10 300 mg cap Take 300 mg by mouth daily.  LACTOBACILLUS ACIDOPHILUS (PROBIOTIC PO) Take 1 Cap by mouth daily.     BOSWELLIA SURENDRA EXTRACT (BOSWELLIA SURENDRA XT, BULK,) Take by mouth daily. Anti-inflammatory supplement. Takes one cap po daily.  MULTIVITAMIN PO Take  by mouth daily. Takes one po daily. No current facility-administered medications for this visit. Allergies: (reviewed)  Allergies   Allergen Reactions    Adhesive Hives    Cephalexin Diarrhea    E-Mycin [Erythromycin] Other (comments)     GI upset    Sudafed [Pseudoephedrine Hcl] Palpitations          OBJECTIVE:    Physical Exam:  VITAL SIGNS: Vitals:    03/26/19 1420   BP: 155/59   Pulse: (!) 55   Weight: 152 lb (68.9 kg)   Height: 5' 4.76\" (1.645 m)     Body mass index is 25.48 kg/m². GENERAL DANIEL: Conversant, alert, oriented. No acute distress. HEENT: HEENT. No thyroid enlargement. No JVD. Neck: Supple without restrictions. RESPIRATORY: Clear to auscultation and percussion to the bases. No CVAT. CARDIOVASC: RRR without murmur/rub. GASTROINT: soft, non-tender, without masses or organomegaly   MUSCULOSKEL: no joint tenderness, deformity or swelling   EXTREMITIES: extremities normal, atraumatic, no cyanosis or edema   PELVIC: Vulva and vagina appear normal. Normal cervix, but there was a small polyp protruding from the endocervical canal.  Bimanual exam reveals normal uterus and adnexa. RECTAL: Deferred   MARTIN SURVEY: No suspicious lymphadenopathy or edema noted. NEURO: Grossly intact. No acute deficit.        Lab Data:    Lab Results   Component Value Date/Time    WBC 4.2 03/05/2019 12:14 PM    HGB 13.7 03/05/2019 12:14 PM    HCT 40.6 03/05/2019 12:14 PM    PLATELET 398 22/58/9133 12:14 PM    MCV 96.0 03/05/2019 12:14 PM     Lab Results   Component Value Date/Time    Sodium 140 03/05/2019 12:14 PM    Potassium 4.3 03/05/2019 12:14 PM    Chloride 104 03/05/2019 12:14 PM    CO2 27 03/05/2019 12:14 PM    Anion gap 9 03/05/2019 12:14 PM    Glucose 82 03/05/2019 12:14 PM    BUN 18 03/05/2019 12:14 PM    Creatinine 0.65 03/05/2019 12:14 PM    BUN/Creatinine ratio 28 (H) 03/05/2019 12:14 PM    GFR est AA >60 03/05/2019 12:14 PM    GFR est non-AA >60 03/05/2019 12:14 PM    Calcium 9.3 03/05/2019 12:14 PM         CT of abdomen/pelvis (12/4/18) - [facility] chest:  Within normal limits. Vasculature:  Scattered atherosclerosis of the aortoiliac system without  aneurysm. Liver, gallbladder and biliary system:  Status post cholecystectomy.  The  liver has a normal appearance. Spleen:  Within normal limits. Pancreas:  Within normal limits. Adrenal glands: Within normal limits. Kidneys, ureters and urinary bladder:  No nephrolithiasis, hydronephrosis  or perinephric stranding.  In the interpolar region of the right  kidney is an oval-shaped 2.0 cm cyst.  No hydroureter.  Evaluation for  small punctate ureteral stones is limited by excreted contrast.  Urinary  bladder is within normal limits. GI tract:  Tiny hiatal hernia is present.  The stomach is otherwise normal.  There are several fluid-filled but nondilated loops of small bowel  throughout the abdomen and pelvis.  The appendix is tentatively identified. No right lower quadrant inflammatory changes.  The cecum is somewhat  patulous and fluid-filled.  Mild amount of fluid within the proximal  ascending colon.  Moderate to large distal colonic and rectal stool burden. No colonic diverticuli. Reproductive organs: Within the right adnexa is a rounded 4.9 cm  hypodensity measuring fluid attenuation. Peritoneum, retroperitoneum and mesentery:  No bulky lympadenopathy.  No  suspicious free fluid.  No free air identified. Superficial soft tissues: Within normal limits. Skeletal structures:  There is asymmetric contour deformity of the superior  aspect of the right iliac crest and a small 9 mm lucent defect with overall  benign appearance.  Degenerative changes of the spine are present.  No  aggressive osseous lytic or blastic lesions. Result Impression   1.  Several fluid-filled but nondilated loops of small bowel with moderate  amount of fluid within the cecum and proximal ascending colon.  Findings  likely represent enterocolitis. 2. No evidence of bowel obstruction, acute inflammatory process or  urolithiasis. 3. Tiny hiatal hernia. 4. Nearly 5 cm fluid density lesion within the right adnexa, likely  representing a cyst.  Routine outpatient pelvic ultrasound is recommended. 5. Moderate to large rectal stool burden which can be seen in the setting  of impaction. Pelvic ultrasound (1/16/19)  FINDINGS TRANSABDOMINAL:  The UTERUS MEASURES 6.6 x 2.6 x 4.1 cm. The central  endometrium measures 5mm in thickness. A small amount of fluid and heterogeneity  are noted in the endometrial canal..     There is no free fluid in the cul-de-sac.     The RIGHT OVARY measures 2.0 x 5.6 x 3.8cm . The LEFT OVARY measures 1.6 x 1.2 x  1.3 cm . The right ovary shows a dominant well-circumscribed cyst measuring 5.0  x 4.0 x 3.8 cm. Both ovaries show normal blood flow by color Doppler. The left  ovary is normal. There is no free fluid in the cul-de-sac.        FINDINGS TRANSVAGINAL:  The uterus shows overall size of 6.1 x 2.7 x 3.9.. Small  heterogeneous fluid collections are noted in the endometrial canal which is  approximately 8 mm in thickness. The internal cervical os is closed.     Further evaluation of the ovaries transvaginally reveals the right ovary  measures 1.6 x 0.9 x 1.7 cm, exclusive of a large simple cyst, and the left  ovary measures 1.9 x 1.5 x 1.4 cm. . The right ovarian cyst measures 4.8 x 3.6 x  3.6 cm. Both ovaries show normal blood flow by color Doppler.     IMPRESSION:   1. Transabdominal pelvic ultrasound revealing large right ovarian cyst. Normal  left ovary. Follow-up to clearing of the right ovarian cyst is recommended. Further evaluation of the current endometrial appearance is suggested. 2. Transvaginal pelvic ultrasound revealing right ovarian cyst 4.8 cm, for which  follow-up to clearing is recommended. Normal left ovary. Heterogeneous  endometrial canal, for which further evaluation is recommended. This appearance  may represent polyps, cysts or other indeterminate abnormality. Neoplasm is not  excluded. IMPRESSION/PLAN:  Morris Chowdhury is a 77 y.o. female with a working diagnosis of right ovarian cystic mass, as well as a thickened endometrium. She is s/p hysteroscopy/D&C with benign findings. I reviewed her pathology with her and reassured her. We will repeat an ultrasound in 2 months to reevaluate the ovarian cyst.  She still hopes to avoid a bigger surgery at this point. I will see her back after the ultrasound.           Signed By: Kendrick Najjar, MD     3/26/2019/3:13 PM

## 2019-05-07 ENCOUNTER — OFFICE VISIT (OUTPATIENT)
Dept: INTERNAL MEDICINE CLINIC | Age: 69
End: 2019-05-07

## 2019-05-07 ENCOUNTER — HOSPITAL ENCOUNTER (OUTPATIENT)
Dept: LAB | Age: 69
Discharge: HOME | End: 2019-05-07
Payer: MEDICARE

## 2019-05-07 VITALS
TEMPERATURE: 96.9 F | OXYGEN SATURATION: 96 % | HEIGHT: 65 IN | SYSTOLIC BLOOD PRESSURE: 126 MMHG | DIASTOLIC BLOOD PRESSURE: 86 MMHG | HEART RATE: 56 BPM | WEIGHT: 151 LBS | BODY MASS INDEX: 25.16 KG/M2 | RESPIRATION RATE: 15 BRPM

## 2019-05-07 DIAGNOSIS — E03.9 HYPOTHYROIDISM, ADULT: ICD-10-CM

## 2019-05-07 DIAGNOSIS — Z13.39 SCREENING FOR ALCOHOLISM: ICD-10-CM

## 2019-05-07 DIAGNOSIS — F32.A DEPRESSION, UNSPECIFIED DEPRESSION TYPE: ICD-10-CM

## 2019-05-07 DIAGNOSIS — Z00.00 MEDICARE ANNUAL WELLNESS VISIT, SUBSEQUENT: Primary | ICD-10-CM

## 2019-05-07 DIAGNOSIS — Z13.31 SCREENING FOR DEPRESSION: ICD-10-CM

## 2019-05-07 DIAGNOSIS — E78.2 MIXED HYPERLIPIDEMIA: ICD-10-CM

## 2019-05-07 PROCEDURE — 84443 ASSAY THYROID STIM HORMONE: CPT

## 2019-05-07 PROCEDURE — 84439 ASSAY OF FREE THYROXINE: CPT

## 2019-05-07 PROCEDURE — 80061 LIPID PANEL: CPT

## 2019-05-07 PROCEDURE — 36415 COLL VENOUS BLD VENIPUNCTURE: CPT

## 2019-05-07 NOTE — PROGRESS NOTES
Subjective: Kaitlyn Martell is a 77 y.o. female who presents today for follow up of her hypertension, hyperlipidemia and hypothyroid. She is also here for her medicare wellness exam.  
 
She is scheduled in June, 2019 for a biopsy of lump on her left neck. Dr. Servando Powers. She denies any chest pain, shortness of breath, syncope, headaches, nausea/vomiting, or any bowel changes. Patient Active Problem List  
Diagnosis Code  Hypothyroid E03.9  Depression F32.9  
 HTN (hypertension) I10  
 Fibrocystic breast N60.19  S/p tibial fracture Z87.81  
 S/P hemorrhoidectomy Z98.890, Z87.19  
 Environmental allergies Z91.09  
 S/P laparoscopic cholecystectomy Z90.49  
 Hiatal hernia K44.9  Colon cancer screening Z12.11  
 History of screening mammography Z92.89  
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  
 H/O bone density study Z92.89  
 Advance directive discussed with patient Z70.80  
 Mass of right ovary N83.9  Thickened endometrium R93.89  Endometrial polyp N84.0  Endocervical polyp N84.1 Current Outpatient Medications Medication Sig Dispense Refill  FLUoxetine (PROZAC) 20 mg capsule TAKE 1 CAPSULE DAILY 90 Cap 1  raNITIdine (ZANTAC) 300 mg tab TAKE 1 TABLET DAILY (Patient taking differently: TAKE 1 TABLET DAILY AS NEEDED) 90 Tab 1  
 simvastatin (ZOCOR) 40 mg tablet TAKE 1 TABLET NIGHTLY 90 Tab 1  
 SYNTHROID 100 mcg tablet TAKE 1 TABLET DAILY BEFORE BREAKFAST 90 Tab 1  coenzyme q10 300 mg cap Take 300 mg by mouth daily.  LACTOBACILLUS ACIDOPHILUS (PROBIOTIC PO) Take 1 Cap by mouth daily.  BOSWELLIA SURENDRA EXTRACT (BOSWELLIA SURENDRA XT, BULK,) Take  by mouth daily. Anti-inflammatory supplement. Takes one cap po daily.  MULTIVITAMIN PO Take  by mouth daily. Takes one po daily. Review of Systems Pertinent items are noted in HPI. Objective: Wt Readings from Last 3 Encounters:  
05/07/19 151 lb (68.5 kg) 03/26/19 152 lb (68.9 kg) 03/15/19 152 lb (68.9 kg) Temp Readings from Last 3 Encounters:  
05/07/19 96.9 °F (36.1 °C) (Oral) 03/08/19 97.3 °F (36.3 °C)  
03/05/19 97.5 °F (36.4 °C) BP Readings from Last 3 Encounters:  
05/07/19 126/86  
03/26/19 155/59  
03/15/19 124/66 Pulse Readings from Last 3 Encounters:  
05/07/19 (!) 56  
03/26/19 (!) 55  
03/15/19 64 Visit Vitals /86 (BP 1 Location: Left arm, BP Patient Position: Sitting) Pulse (!) 56 Temp 96.9 °F (36.1 °C) (Oral) Resp 15 Ht 5' 4.76\" (1.645 m) Wt 151 lb (68.5 kg) SpO2 96% BMI 25.31 kg/m² General appearance: alert, cooperative, no distress, appears stated age Head: Normocephalic, without obvious abnormality, atraumatic Neck: supple, symmetrical, trachea midline, no adenopathy, thyroid: not enlarged, symmetric, no tenderness/mass/nodules, no carotid bruit and no JVD Lungs: clear to auscultation bilaterally Heart: regular rate and rhythm, S1, S2 normal, no murmur, click, rub or gallop Extremities: extremities normal, atraumatic, no cyanosis or edema Pulses: 2+ and symmetric Assessment/Plan: 1. Medicare annual wellness visit, subsequent 
-shingrix script given 11/2/18. Not completed yet. - [place] ANNUAL ALCOHOL SCREEN 15 MIN 
- [address] 2. Screening for alcoholism - [place] ANNUAL ALCOHOL SCREEN 15 MIN 3. Screening for depression 
 
- [address] 4. Depression, unspecified depression type 
-I evaluated and recommended to continue current doses of medications. 5. Mixed hyperlipidemia 
-due for fasting lipid panel at this time.  
 
- LIPID PANEL 6. Hypothyroidism, adult 
-clinically euthyroid. - TSH 3RD GENERATION 
- T4, FREE Follow-up Disposition:  
 
Follow up in 6 months Return if symptoms worsen or fail to improve. Advised patient to call back or return to office if symptoms worsen/change/persist.  
 
Discussed expected course/resolution/complications of diagnosis in detail with patient. Medication risks/benefits/costs/interactions/alternatives discussed with patient. Patient was given an after visit summary which includes diagnoses, current medications, & vitals. Patient expressed understanding with the diagnosis and plan. This is the Subsequent Medicare Annual Wellness Exam, performed 12 months or more after the Initial AWV or the last Subsequent AWV I have reviewed the patient's medical history in detail and updated the computerized patient record. History Past Medical History:  
Diagnosis Date  Arthritis   
 lower back  Cancer ([facility] [address] BCCAs removed - 6 on legs  Depression  Environmental allergies 12/7/2011  Fibrocystic breast 12/7/2011  GERD (gastroesophageal reflux disease)  Hiatal hernia 12/7/2011  
 HIGH BLOOD PRESSURE   
 not on meds  HTN (hypertension) 12/7/2011  Hypothyroid 12/7/2011  Nausea & vomiting  S/P hemorrhoidectomy 12/7/2011  S/P laparoscopic cholecystectomy 12/7/2011  S/p tibial fracture 12/7/2011  Tendonitis of wrist, left 12/2017  
 cortisone shot  Thyroid disorder Past Surgical History:  
Procedure Laterality Date  ENDOSCOPY, COLON, DIAGNOSTIC  11/2010 ([facility])-f/u 5 yrs.  HX BREAST LUMPECTOMY    
 right - benign  HX CHOLECYSTECTOMY  HX GI    
 hemorrhoidectomy  HX GYN  03/2019  
 d/c for thickened endometrium  HX ORTHOPAEDIC    
 surgery for tibial plateau fx - took bone from hip/has hardware knee to ankle Current Outpatient Medications Medication Sig Dispense Refill  FLUoxetine (PROZAC) 20 mg capsule TAKE 1 CAPSULE DAILY 90 Cap 1  raNITIdine (ZANTAC) 300 mg tab TAKE 1 TABLET DAILY (Patient taking differently: TAKE 1 TABLET DAILY AS NEEDED) 90 Tab 1  
  simvastatin (ZOCOR) 40 mg tablet TAKE 1 TABLET NIGHTLY 90 Tab 1  
 SYNTHROID 100 mcg tablet TAKE 1 TABLET DAILY BEFORE BREAKFAST 90 Tab 1  coenzyme q10 300 mg cap Take 300 mg by mouth daily.  LACTOBACILLUS ACIDOPHILUS (PROBIOTIC PO) Take 1 Cap by mouth daily.  BOSWELLIA SURENDRA EXTRACT (BOSWELLIA SURENDRA XT, BULK,) Take  by mouth daily. Anti-inflammatory supplement. Takes one cap po daily.  MULTIVITAMIN PO Take  by mouth daily. Takes one po daily. Allergies Allergen Reactions  Adhesive Hives  Cephalexin Diarrhea  E-Mycin [Erythromycin] Other (comments) GI upset  Sudafed [Pseudoephedrine Hcl] Palpitations Family History Problem Relation Age of Onset  Alcohol abuse Mother   
     cirrhosis  Alcohol abuse Father  Other Father   
     cerebral hemorrhage  Cancer Brother   
     prostate  Heart Disease Brother 52  
     MI(age 52), bypass(66)  Diabetes Maternal Aunt  Hypertension Maternal Aunt  Cancer Maternal Uncle COLON  
 Heart Disease Maternal Grandmother MI  
 Cancer Cousin BREAST  Anesth Problems Neg Hx Social History Tobacco Use  Smoking status: Never Smoker  Smokeless tobacco: Never Used Substance Use Topics  Alcohol use: Yes Alcohol/week: 4.2 oz Types: 7 Glasses of wine per week Patient Active Problem List  
Diagnosis Code  Hypothyroid E03.9  Depression F32.9  
 HTN (hypertension) I10  
 Fibrocystic breast N60.19  S/p tibial fracture Z87.81  
 S/P hemorrhoidectomy Z98.890, Z87.19  
 Environmental allergies Z91.09  
 S/P laparoscopic cholecystectomy Z90.49  
 Hiatal hernia K44.9  Colon cancer screening Z12.11  
 History of screening mammography Z92.89  
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  
 Advance directive discussed with patient Z70.80  
 Mass of right ovary N83.9  Thickened endometrium R93.89  Endometrial polyp N84.0  Endocervical polyp N84.1 Depression Risk Factor Screening:  
 
3 most recent PHQ Screens 3/15/2019 PHQ Not Done - Little interest or pleasure in doing things Not at all Feeling down, depressed, irritable, or hopeless Not at all Total Score PHQ 2 0 Alcohol Risk Factor Screening: You do not drink alcohol or very rarely. Functional Ability and Level of Safety:  
Hearing Loss Hearing is good. Activities of Daily Living The home contains: no safety equipment. Patient does total self care Fall Risk Fall Risk Assessment, last 12 mths 3/15/2019 Able to walk? Yes Fall in past 12 months? No  
 
 
Abuse Screen Patient is not abused Cognitive Screening Evaluation of Cognitive Function: 
Has your family/caregiver stated any concerns about your memory: no 
Normal 
 
Patient Care Team  
Patient Care Team: 
Linda Martinez MD as PCP - General (Internal Medicine) Arin Reardon MD as Physician (Orthopedic Surgery) Kandace Urbina MD as Physician (Pain Management) Zenia Collazo MD as Physician (Ophthalmology) Marvin Brand MD as Physician (Gynecology) Glo Stafford MD (Cardiology) Assessment/Plan Education and counseling provided: 
Are appropriate based on today's review and evaluation Diagnoses and all orders for this visit: 
 
1. Medicare annual wellness visit, subsequent -     [place] ANNUAL ALCOHOL SCREEN 15 MIN 
-     [address] 2. Screening for alcoholism -     [place] ANNUAL ALCOHOL SCREEN 15 MIN 3. Screening for depression 
-     [address] There are no preventive care reminders to display for this patient.

## 2019-05-07 NOTE — PROGRESS NOTES
Patient's identity verified with two patient identifiers (name and date of birth). Reviewed record in preparation for visit and have obtained necessary documentation. 1. Have you been to the ER, urgent care clinic since your last visit? Hospitalized since your last visit? No 
2. Have you seen or consulted any other health care providers outside of the [address] [place] [address] since your last visit? Include any pap smears or colon screening. No 
 
Chief Complaint Patient presents with [facility] Annual Wellness Visit Medicare wellness due, fasting.  Depression 6 month follow up Fasting. Health Maintenance Due Topic Date Due  MEDICARE YEARLY EXAM  
due 03/14/2019 Wt Readings from Last 3 Encounters:  
05/07/19 151 lb (68.5 kg) 03/26/19 152 lb (68.9 kg) 03/15/19 152 lb (68.9 kg) Temp Readings from Last 3 Encounters:  
05/07/19 96.9 °F (36.1 °C) (Oral) 03/08/19 97.3 °F (36.3 °C)  
03/05/19 97.5 °F (36.4 °C) BP Readings from Last 3 Encounters:  
05/07/19 126/86  
03/26/19 155/59  
03/15/19 124/66 Pulse Readings from Last 3 Encounters:  
05/07/19 (!) 56  
03/26/19 (!) 55  
03/15/19 64 Learning Assessment: 
:  
 
Learning Assessment 5/7/2019 3/13/2018 2/4/2015 1/28/2014 1/20/2014 1/10/2013 PRIMARY LEARNER Patient Patient Patient Patient Patient Patient HIGHEST LEVEL OF EDUCATION - PRIMARY LEARNER  GRADUATED HIGH SCHOOL OR GED GRADUATED HIGH SCHOOL OR GED GRADUATED HIGH SCHOOL OR GED GRADUATED HIGH SCHOOL OR GED GRADUATED HIGH SCHOOL OR GED -  
BARRIERS PRIMARY LEARNER NONE NONE NONE NONE NONE -  
CO-LEARNER CAREGIVER No No No No No - PRIMARY LANGUAGE ENGLISH ENGLISH ENGLISH ENGLISH ENGLISH ENGLISH  NEED - - No - - - LEARNER PREFERENCE PRIMARY VIDEOS DEMONSTRATION LISTENING LISTENING DEMONSTRATION DEMONSTRATION  
  - - DEMONSTRATION DEMONSTRATION - -  
LEARNING SPECIAL TOPICS - - no - - -  
 ANSWERED BY patient patient patient Heriberto Quirozs patient patient RELATIONSHIP SELF SELF SELF SELF SELF SELF

## 2019-05-07 NOTE — PATIENT INSTRUCTIONS
Medicare Wellness Visit, Female The best way to live healthy is to have a lifestyle where you eat a well-balanced diet, exercise regularly, limit alcohol use, and quit all forms of tobacco/nicotine, if applicable. Regular preventive services are another way to keep healthy. Preventive services (vaccines, screening tests, monitoring & exams) can help personalize your care plan, which helps you manage your own care. Screening tests can find health problems at the earliest stages, when they are easiest to treat. [facility] follows the current, evidence-based guidelines published by the [facility] ([facility]) when recommending preventive services for our patients. Because we follow these guidelines, sometimes recommendations change over time as research supports it. (For example, mammograms used to be recommended annually. Even though Medicare will still pay for an annual mammogram, the newer guidelines recommend a mammogram every two years for women of average risk.) Of course, you and your doctor may decide to screen more often for some diseases, based on your risk and your health status. Preventive services for you include: - Medicare offers their members a free annual wellness visit, which is time for you and your primary care provider to discuss and plan for your preventive service needs. Take advantage of this benefit every year! 
-All adults over the age of 72 should receive the recommended pneumonia vaccines. Current USPSTF guidelines recommend a series of two vaccines for the best pneumonia protection.  
-All adults should have a flu vaccine yearly and a tetanus vaccine every 10 years. All adults age 61 and older should receive a shingles vaccine once in their lifetime.   
-A bone mass density test is recommended when a woman turns 65 to screen for osteoporosis. This test is only recommended one time, as a screening. Some providers will use this same test as a disease monitoring tool if you already have osteoporosis. -All adults age 38-68 who are overweight should have a diabetes screening test once every three years.  
-Other screening tests and preventive services for persons with diabetes include: an eye exam to screen for diabetic retinopathy, a kidney function test, a foot exam, and stricter control over your cholesterol.  
-Cardiovascular screening for adults with routine risk involves an electrocardiogram (ECG) at intervals determined by your doctor.  
-Colorectal cancer screenings should be done for adults age 54-65 with no increased risk factors for colorectal cancer. There are a number of acceptable methods of screening for this type of cancer. Each test has its own benefits and drawbacks. Discuss with your doctor what is most appropriate for you during your annual wellness visit. The different tests include: colonoscopy (considered the best screening method), a fecal occult blood test, a fecal DNA test, and sigmoidoscopy. -Breast cancer screenings are recommended every other year for women of normal risk, age 54-69. 
-Cervical cancer screenings for women over age 72 are only recommended with certain risk factors.  
-All adults born between [facility] should be screened once for Hepatitis C. Here is a list of your current Health Maintenance items (your personalized list of preventive services) with a due date: There are no preventive care reminders to display for this patient.

## 2019-05-08 LAB
CHOLEST SERPL-MCNC: 192 MG/DL (ref 100–199)
HDLC SERPL-MCNC: 65 MG/DL
INTERPRETATION, 910389: NORMAL
LDLC SERPL CALC-MCNC: 105 MG/DL (ref 0–99)
T4 FREE SERPL-MCNC: 1.47 NG/DL (ref 0.82–1.77)
TRIGL SERPL-MCNC: 110 MG/DL (ref 0–149)
TSH SERPL DL<=0.005 MIU/L-ACNC: 0.82 UIU/ML (ref 0.45–4.5)
VLDLC SERPL CALC-MCNC: 22 MG/DL (ref 5–40)

## 2019-05-14 RX ORDER — LEVOTHYROXINE SODIUM 100 UG/1
TABLET ORAL
Qty: 90 TAB | Refills: 1 | Status: SHIPPED | OUTPATIENT
Start: 2019-05-14 | End: 2019-11-10 | Stop reason: SDUPTHER

## 2019-05-21 ENCOUNTER — HOSPITAL ENCOUNTER (OUTPATIENT)
Dept: ULTRASOUND IMAGING | Age: 69
Discharge: HOME | End: 2019-05-21
Attending: OBSTETRICS & GYNECOLOGY
Payer: MEDICARE

## 2019-05-21 DIAGNOSIS — N83.8 MASS OF RIGHT OVARY: ICD-10-CM

## 2019-05-21 PROCEDURE — 76856 US EXAM PELVIC COMPLETE: CPT

## 2019-05-21 PROCEDURE — 76830 TRANSVAGINAL US NON-OB: CPT

## 2019-05-21 RX ORDER — SIMVASTATIN 40 MG/1
TABLET, FILM COATED ORAL
Qty: 90 TAB | Refills: 1 | Status: SHIPPED | OUTPATIENT
Start: 2019-05-21 | End: 2019-11-17 | Stop reason: SDUPTHER

## 2019-05-28 ENCOUNTER — HOSPITAL ENCOUNTER (OUTPATIENT)
Dept: ULTRASOUND IMAGING | Age: 69
Discharge: HOME | End: 2019-05-28
Attending: OTOLARYNGOLOGY
Payer: MEDICARE

## 2019-05-28 DIAGNOSIS — R22.1 NECK MASS: ICD-10-CM

## 2019-05-28 PROCEDURE — 88305 TISSUE EXAM BY PATHOLOGIST: CPT

## 2019-05-28 PROCEDURE — 10005 FNA BX W/US GDN 1ST LES: CPT

## 2019-05-28 PROCEDURE — 74011250636 HC RX REV CODE- 250/636: Performed by: RADIOLOGY

## 2019-05-28 RX ORDER — LIDOCAINE HYDROCHLORIDE 10 MG/ML
5 INJECTION, SOLUTION EPIDURAL; INFILTRATION; INTRACAUDAL; PERINEURAL
Status: COMPLETED | OUTPATIENT
Start: 2019-05-28 | End: 2019-05-28

## 2019-05-28 RX ADMIN — LIDOCAINE HYDROCHLORIDE 5 ML: 10 INJECTION, SOLUTION EPIDURAL; INFILTRATION; INTRACAUDAL; PERINEURAL at 14:00

## 2019-06-19 ENCOUNTER — TELEPHONE (OUTPATIENT)
Dept: GYNECOLOGY | Age: 69
End: 2019-06-19

## 2019-06-19 NOTE — TELEPHONE ENCOUNTER
Patient  and states she is waiting on her ultrasound results from 5/21/19. Do you want her to make a follow up appointment to discuss?

## 2019-06-20 NOTE — TELEPHONE ENCOUNTER
I called and s/w pt, gave her Dr. Maryse Figueroa recommendation and she will call back once she has her calendar to schedule a follow up appt in 4-6 months    She was supposed to schedule a follow-up, but that is OK.  Just let her know that there is no change.  Remains stable.  I recommend that we just follow it.  No surgery.  Have her follow up in 4-6 months.

## 2019-09-20 ENCOUNTER — OFFICE VISIT (OUTPATIENT)
Dept: CARDIOLOGY CLINIC | Age: 69
End: 2019-09-20

## 2019-09-20 VITALS
HEIGHT: 64 IN | RESPIRATION RATE: 18 BRPM | SYSTOLIC BLOOD PRESSURE: 138 MMHG | BODY MASS INDEX: 26.32 KG/M2 | WEIGHT: 154.2 LBS | HEART RATE: 58 BPM | DIASTOLIC BLOOD PRESSURE: 84 MMHG

## 2019-09-20 DIAGNOSIS — I10 ESSENTIAL HYPERTENSION: ICD-10-CM

## 2019-09-20 DIAGNOSIS — R93.1 AGATSTON CAC SCORE 100-199: ICD-10-CM

## 2019-09-20 DIAGNOSIS — E78.2 MIXED HYPERLIPIDEMIA: Primary | ICD-10-CM

## 2019-09-20 LAB
STRESS ANGINA INDEX: 0
STRESS BASELINE DIAS BP: 80 MMHG
STRESS BASELINE HR: 58 BPM
STRESS BASELINE SYS BP: 160 MMHG
STRESS ESTIMATED WORKLOAD: 10.1 METS
STRESS EXERCISE DUR MIN: NORMAL MIN:SEC
STRESS PEAK DIAS BP: 80 MMHG
STRESS PEAK SYS BP: 220 MMHG
STRESS PERCENT HR ACHIEVED: 85 %
STRESS POST PEAK HR: 127 BPM
STRESS RATE PRESSURE PRODUCT: NORMAL BPM*MMHG
STRESS TARGET HR: 150 BPM

## 2019-09-20 NOTE — PROGRESS NOTES
HISTORY OF PRESENT ILLNESS  Jesus Alberto Deng is a 77 y.o. female     SUMMARY:   Problem List  Date Reviewed: 9/19/2019          Codes Class Noted    Endometrial polyp ICD-10-CM: N84.0  ICD-9-CM: 621.0  3/8/2019        Endocervical polyp ICD-10-CM: N84.1  ICD-9-CM: 622.7  3/8/2019        Mass of right ovary ICD-10-CM: N83.9  ICD-9-CM: 620.9  1/22/2019        Thickened endometrium ICD-10-CM: R93.89  ICD-9-CM: 793.5  1/22/2019        Advance directive discussed with patient ICD-10-CM: Z71.89  ICD-9-CM: V65.49  3/13/2018    Overview Signed 3/13/2018 11:13 AM by Raisa Meng MD     3/13/2018 - -patient has an established AD. Will bring copy in for chart. H/O bone density study (Chronic) ICD-10-CM: Z92.89  ICD-9-CM: V15.89  9/9/2016    Overview Addendum 9/11/2019  1:09 PM by Raisa Meng MD     [facility] [place],9/4/19- osteopenia. Followed by Dr. Clark Smiley CAC score 100-199 ICD-10-CM: R93.1  ICD-9-CM: 793.2  6/10/2016    Overview Signed 6/10/2016  8:24 AM by Bobbi Baez MD     5/16 score 188, 80th percentile             Nuclear cataract ICD-10-CM: CEQ9007  ICD-9-CM: 366.16  2/1/2016        Benign neoplasm of choroid ICD-10-CM: D31.30  ICD-9-CM: 224.6  2/1/2016        Hyperlipidemia ICD-10-CM: E78.5  ICD-9-CM: 272.4  1/10/2013        Colon cancer screening (Chronic) ICD-10-CM: Z12.11  ICD-9-CM: V76.51  7/10/2012    Overview Addendum 1/17/2017  4:26 PM by MANASA [address] 11/18/2015., poor prep, follow up in 1 year. Polyps removed. Repeat done 12/2016, polyps removed; follow-up in 3 years.              History of screening mammography (Chronic) ICD-10-CM: B41.48  ICD-9-CM: V15.89  7/10/2012    Overview Addendum 3/13/2018 11:09 AM by Raisa Meng MD     [facility], 6/2017             Pap smear for cervical cancer screening (Chronic) ICD-10-CM: Z12.4  ICD-9-CM: V76.2  7/10/2012    Overview Addendum 3/10/2017 11:02 AM by Raisa Meng MD 6/16, [facility]             Hypothyroid ICD-10-CM: E03.9  ICD-9-CM: 244.9  12/7/2011        Depression ICD-10-CM: F32.9  ICD-9-CM: 947  12/7/2011        HTN (hypertension) ICD-10-CM: I10  ICD-9-CM: 401.9  12/7/2011        Fibrocystic breast ICD-10-CM: N60.19  ICD-9-CM: 610.1  12/7/2011        S/p tibial fracture ICD-10-CM: Z87.81  ICD-9-CM: V54.16  12/7/2011    Overview Signed 12/7/2011  2:17 PM by Angela Angulo MD     Right with venkatesh placement, 1995             S/P hemorrhoidectomy ICD-10-CM: C99.050, Z87.19  ICD-9-CM: V45.89  12/7/2011    Overview Signed 12/7/2011  2:18 PM by MD Dr. Goran Morales             Environmental allergies ICD-10-CM: Z91.09  ICD-9-CM: V15.09  12/7/2011        S/P laparoscopic cholecystectomy ICD-10-CM: O65.39  ICD-9-CM: V45.89  12/7/2011        Hiatal hernia ICD-10-CM: K44.9  ICD-9-CM: 553.3  12/7/2011    Overview Signed 12/7/2011  2:18 PM by Angela Angulo MD     egd 11/9/10, Dr. Vinicius Burnett Medications on File Prior to Visit   Medication Sig    FLUoxetine (PROZAC) 20 mg capsule TAKE 1 CAPSULE DAILY    simvastatin (ZOCOR) 40 mg tablet TAKE 1 TABLET NIGHTLY    SYNTHROID 100 mcg tablet TAKE 1 TABLET DAILY BEFORE BREAKFAST    raNITIdine (ZANTAC) 300 mg tab TAKE 1 TABLET DAILY (Patient taking differently: as needed.)    coenzyme q10 300 mg cap Take 300 mg by mouth daily.  LACTOBACILLUS ACIDOPHILUS (PROBIOTIC PO) Take 1 Cap by mouth daily.  BOSWELLIA SURENDRA EXTRACT (BOSWELLIA SURENDRA XT, BULK,) Take  by mouth daily. Anti-inflammatory supplement. Takes one cap po daily.  MULTIVITAMIN PO Take  by mouth daily. Takes one po daily. No current facility-administered medications on file prior to visit.         CARDIOLOGY STUDIES TO DATE:  12/11 normal stress echo at [facility] (notes reviewed and summarized under media tab)    5/16 score 188, 80th percentile  6/17 normal stress echo  9/19 normal stress echo    Chief Complaint   Patient presents with    Cholesterol Problem     HPI :  Ms. Ynag Aguillon is doing well. She is somewhat active, but gets no regular exercise. She had a stress echocardiogram today, which was completely normal.  Her last LDL cholesterol in May was 105, which is a little unusual for her and she has followup with Dr. Dell Mckinley in the next couple of months to repeat all of that. Her initial blood pressure was elevated, but on recheck, it came down to normal.        CARDIAC ROS:   negative for chest pressure/discomfort, dyspnea, palpitations, syncope, orthopnea, paroxysmal nocturnal dyspnea, exertional chest pressure/discomfort, claudication, lower extremity edema    Family History   Problem Relation Age of Onset    Alcohol abuse Mother         cirrhosis    Alcohol abuse Father     Other Father         cerebral hemorrhage    Cancer Brother         prostate    Heart Disease Brother 46        MI(age 46), bypass(66)    Diabetes Maternal Aunt     Hypertension Maternal Aunt     Cancer Maternal Uncle         COLON    Heart Disease Maternal Grandmother         MI    Cancer Cousin         BREAST    Anesth Problems Neg Hx        Past Medical History:   Diagnosis Date    Arthritis     lower back    Cancer ([facility] [address]     BCCAs removed - 6 on legs    Depression     Environmental allergies 12/7/2011    Fibrocystic breast 12/7/2011    GERD (gastroesophageal reflux disease)     Hiatal hernia 12/7/2011    HIGH BLOOD PRESSURE     not on meds    HTN (hypertension) 12/7/2011    Hypothyroid 12/7/2011    Nausea & vomiting     S/P hemorrhoidectomy 12/7/2011    S/P laparoscopic cholecystectomy 12/7/2011    S/p tibial fracture 12/7/2011    Tendonitis of wrist, left 12/2017    cortisone shot    Thyroid disorder        GENERAL ROS:  A comprehensive review of systems was negative except for that written in the HPI.     Visit Vitals  /84 (BP 1 Location: Left arm, BP Patient Position: Sitting)   Pulse (!) 58   Resp 18   Ht 5' 4\" (1.626 m) Wt 154 lb 3.2 oz (69.9 kg)   BMI 26.47 kg/m²       Wt Readings from Last 3 Encounters:   09/20/19 154 lb 3.2 oz (69.9 kg)   05/07/19 151 lb (68.5 kg)   03/26/19 152 lb (68.9 kg)            BP Readings from Last 3 Encounters:   09/20/19 138/84   05/07/19 126/86   03/26/19 155/59       PHYSICAL EXAM  General appearance: alert, cooperative, no distress, appears stated age  Neurologic: Alert and oriented X 3  Neck: supple, symmetrical, trachea midline, no adenopathy, no carotid bruit and no JVD  Lungs: clear to auscultation bilaterally  Heart: regular rate and rhythm, S1, S2 normal, no murmur, click, rub or gallop  Extremities: extremities normal, atraumatic, no cyanosis or edema    Lab Results   Component Value Date/Time    Cholesterol, total 192 05/07/2019 09:59 AM    Cholesterol, total 180 11/02/2018 10:30 AM    Cholesterol, total 194 03/13/2018 11:31 AM    Cholesterol, total 184 09/11/2017 12:00 AM    Cholesterol, total 164 03/10/2017 11:08 AM    HDL Cholesterol 65 05/07/2019 09:59 AM    HDL Cholesterol 63 11/02/2018 10:30 AM    HDL Cholesterol 66 03/13/2018 11:31 AM    HDL Cholesterol 72 09/11/2017 12:00 AM    HDL Cholesterol 73 03/10/2017 11:08 AM    LDL, calculated 105 (H) 05/07/2019 09:59 AM    LDL, calculated 94 11/02/2018 10:30 AM    LDL, calculated 96 03/13/2018 11:31 AM    LDL, calculated 87 09/11/2017 12:00 AM    LDL, calculated 70 03/10/2017 11:08 AM    Triglyceride 110 05/07/2019 09:59 AM    Triglyceride 114 11/02/2018 10:30 AM    Triglyceride 158 (H) 03/13/2018 11:31 AM    Triglyceride 123 09/11/2017 12:00 AM    Triglyceride 104 03/10/2017 11:08 AM    CHOL/HDL Ratio 2.4 07/15/2010 11:10 AM     ASSESSMENT  Ms. Colleen Carrillo is stable and asymptomatic, well compensated on a good medical regimen and needs no further cardiac testing at this time. I told her that if her next LDL cholesterol is greater than 100, I would recommend she switch to high dose Lipitor or Crestor.          current treatment plan is effective, no change in therapy  lab results and schedule of future lab studies reviewed with patient  reviewed diet, exercise and weight control    Encounter Diagnoses   Name Primary?  Mixed hyperlipidemia Yes    Essential hypertension     Agatston CAC score 100-199      No orders of the defined types were placed in this encounter. Follow-up and Dispositions    · Return in about 1 year (around 9/20/2020).          Rich Jerome MD  9/20/2019

## 2019-10-04 ENCOUNTER — OFFICE VISIT (OUTPATIENT)
Dept: INTERNAL MEDICINE CLINIC | Age: 69
End: 2019-10-04

## 2019-10-04 ENCOUNTER — TELEPHONE (OUTPATIENT)
Dept: INTERNAL MEDICINE CLINIC | Age: 69
End: 2019-10-04

## 2019-10-04 ENCOUNTER — HOSPITAL ENCOUNTER (OUTPATIENT)
Dept: GENERAL RADIOLOGY | Age: 69
Discharge: HOME | End: 2019-10-04
Attending: NURSE PRACTITIONER
Payer: MEDICARE

## 2019-10-04 VITALS
OXYGEN SATURATION: 96 % | TEMPERATURE: 97.9 F | HEIGHT: 64 IN | DIASTOLIC BLOOD PRESSURE: 84 MMHG | BODY MASS INDEX: 26.12 KG/M2 | RESPIRATION RATE: 18 BRPM | SYSTOLIC BLOOD PRESSURE: 122 MMHG | WEIGHT: 153 LBS | HEART RATE: 61 BPM

## 2019-10-04 DIAGNOSIS — R07.81 RIB PAIN ON LEFT SIDE: ICD-10-CM

## 2019-10-04 DIAGNOSIS — R07.81 RIB PAIN ON LEFT SIDE: Primary | ICD-10-CM

## 2019-10-04 PROCEDURE — 71101 X-RAY EXAM UNILAT RIBS/CHEST: CPT

## 2019-10-04 RX ORDER — RANITIDINE 300 MG/1
300 TABLET ORAL DAILY
Qty: 30 TAB | Refills: 0 | Status: SHIPPED | OUTPATIENT
Start: 2019-10-04 | End: 2019-11-03

## 2019-10-04 RX ORDER — METHYLPREDNISOLONE 4 MG/1
TABLET ORAL
Qty: 1 DOSE PACK | Refills: 0 | Status: SHIPPED | OUTPATIENT
Start: 2019-10-04 | End: 2019-12-17

## 2019-10-04 NOTE — PROGRESS NOTES
HISTORY OF PRESENT ILLNESS  Page Bolivar is a 77 y.o. female. Patient reports left lower rib pain x 1 month. It seems to be getting worse over the past month. No rash. No known injury. Patient denies any exercise. It is not worse with movement, but worse with palpation. Pain is fairly constant, but dull unless palpated. Pain does not radiate. No pain with deep breathing. She has tried Aleve BID with no improvement. She has also tried heat and ice with no improvement. Visit Vitals  /84 (BP 1 Location: Left arm, BP Patient Position: Sitting)   Pulse 61   Temp 97.9 °F (36.6 °C) (Oral)   Resp 18   Ht 5' 4\" (1.626 m)   Wt 153 lb (69.4 kg)   SpO2 96%   BMI 26.26 kg/m²       HPI    Review of Systems   Musculoskeletal:        Left rib pain       Physical Exam   Constitutional: She is oriented to person, place, and time. She appears well-developed and well-nourished. Pulmonary/Chest: Effort normal and breath sounds normal.   Musculoskeletal:   Left lower rib cage pain, worse in intercostal space posteriorly and radiates to left side; worse with palpation   Neurological: She is alert and oriented to person, place, and time. Skin: Skin is warm and dry. No rash noted. Psychiatric: She has a normal mood and affect. No rash or bruising    ASSESSMENT and PLAN    ICD-10-CM ICD-9-CM    1.  Rib pain on left side R07.81 786.50 XR RIBS LT W PA CXR MIN 3 V     Orders Placed This Encounter    XR RIBS LT W PA CXR MIN 3 V    famotidine (PEPCID PO)    raNITIdine (ZANTAC) 300 mg tab    methylPREDNISolone (MEDROL DOSEPACK) 4 mg tablet   start medrol dose pack  Xray ordered  Patient requests refill of zantac  Follow-up if no improvement over the next few weeks

## 2019-10-04 NOTE — PROGRESS NOTES
1. Have you been to the ER, urgent care clinic since your last visit? Hospitalized since your last visit? No    2. Have you seen or consulted any other health care providers outside of the [address] [place] [address] since your last visit? Include any pap smears or colon screening.  No

## 2019-10-07 ENCOUNTER — TELEPHONE (OUTPATIENT)
Dept: INTERNAL MEDICINE CLINIC | Age: 69
End: 2019-10-07

## 2019-10-07 RX ORDER — FAMOTIDINE 20 MG/1
20 TABLET, FILM COATED ORAL
Qty: 180 TAB | Refills: 0 | Status: SHIPPED | OUTPATIENT
Start: 2019-10-07 | End: 2019-12-17

## 2019-10-07 NOTE — TELEPHONE ENCOUNTER
Patient states she wanted a refill on Pepcid not Zantac. Please send a script for Pepcid to [facility] in [place] [phone].

## 2019-10-07 NOTE — TELEPHONE ENCOUNTER
Patient states she is not taking Pepcid currently but does not want to take Zantac anymore due to all the information on the news. Patient would like a 30 day supply to [facility] in [place], and like a 90 day sent to mail order.

## 2019-12-16 ENCOUNTER — TELEPHONE (OUTPATIENT)
Dept: CARDIOLOGY CLINIC | Age: 69
End: 2019-12-16

## 2019-12-17 ENCOUNTER — OFFICE VISIT (OUTPATIENT)
Dept: CARDIOLOGY CLINIC | Age: 69
End: 2019-12-17

## 2019-12-17 ENCOUNTER — OFFICE VISIT (OUTPATIENT)
Dept: INTERNAL MEDICINE CLINIC | Age: 69
End: 2019-12-17

## 2019-12-17 VITALS
BODY MASS INDEX: 26.09 KG/M2 | HEART RATE: 67 BPM | WEIGHT: 152.8 LBS | RESPIRATION RATE: 16 BRPM | OXYGEN SATURATION: 98 % | TEMPERATURE: 97.9 F | SYSTOLIC BLOOD PRESSURE: 136 MMHG | HEIGHT: 64 IN | DIASTOLIC BLOOD PRESSURE: 80 MMHG

## 2019-12-17 VITALS
BODY MASS INDEX: 26.02 KG/M2 | WEIGHT: 152.4 LBS | HEIGHT: 64 IN | HEART RATE: 71 BPM | OXYGEN SATURATION: 97 % | SYSTOLIC BLOOD PRESSURE: 116 MMHG | RESPIRATION RATE: 16 BRPM | DIASTOLIC BLOOD PRESSURE: 74 MMHG

## 2019-12-17 DIAGNOSIS — R20.0 LEFT JAW NUMBNESS: ICD-10-CM

## 2019-12-17 DIAGNOSIS — I10 ESSENTIAL HYPERTENSION: ICD-10-CM

## 2019-12-17 DIAGNOSIS — R20.2 ARM PARESTHESIA, LEFT: Primary | ICD-10-CM

## 2019-12-17 DIAGNOSIS — Z82.49 FAMILY HISTORY OF PREMATURE CAD: ICD-10-CM

## 2019-12-17 DIAGNOSIS — E78.2 MIXED HYPERLIPIDEMIA: ICD-10-CM

## 2019-12-17 DIAGNOSIS — R93.1 AGATSTON CAC SCORE 100-199: Primary | ICD-10-CM

## 2019-12-17 RX ORDER — ASPIRIN 81 MG/1
TABLET ORAL DAILY
COMMUNITY

## 2019-12-17 RX ORDER — CALCIUM CARBONATE 200(500)MG
1 TABLET,CHEWABLE ORAL DAILY
COMMUNITY

## 2019-12-17 NOTE — PROGRESS NOTES
HISTORY OF PRESENT ILLNESS  Galo Olmedo is a 68 y.o. female. Numbness   The history is provided by the patient. This is a new problem. Episode onset: 3 weeks. The problem has been gradually worsening. There was left facial and left upper extremity (notes tingling in the upper arm. Left jaw numbness has been 2 d only.) focality noted. Primary symptoms include loss of sensation. Pertinent negatives include no focal weakness, no loss of balance, no slurred speech, no speech difficulty and no visual change. There has been no fever. Associated symptoms include headaches. Pertinent negatives include no vomiting, no altered mental status, no confusion and no nausea. Associated symptoms comments: Posterior, c/w cervical strain. Associated medical issues do not include CVA. Associated medical issues comments: strong FHx of CAD. Review of Systems   Gastrointestinal: Negative for nausea and vomiting. Neurological: Positive for numbness and headaches. Negative for focal weakness, speech difficulty and loss of balance. Psychiatric/Behavioral: Negative for confusion. Physical Exam  Vitals signs and nursing note reviewed. Eyes:      Extraocular Movements: Extraocular movements intact. Pupils: Pupils are equal, round, and reactive to light. Neck:      Musculoskeletal: Neck supple. No muscular tenderness. Vascular: No carotid bruit. Cardiovascular:      Rate and Rhythm: Normal rate and regular rhythm. Heart sounds: No murmur. No friction rub. No gallop. Pulmonary:      Effort: Pulmonary effort is normal. No respiratory distress. Breath sounds: Normal breath sounds. Lymphadenopathy:      Cervical: No cervical adenopathy. Neurological:      General: No focal deficit present. Cranial Nerves: No cranial nerve deficit. Sensory: No sensory deficit. Motor: No weakness.       Coordination: Coordination normal.      Gait: Gait normal.      Deep Tendon Reflexes: Reflexes normal.         ASSESSMENT and PLAN  Diagnoses and all orders for this visit:    1. Arm paresthesia, left  -     MRI BRAIN WO CONT; Future  -     DUPLEX CAROTID BILATERAL; Future  -     XR SPINE CERV 4 OR 5 V; Future  - Based on work up may need to consider EMG or See neurologist as directed     2. Left jaw numbness  -     MRI BRAIN WO CONT; Future  -     DUPLEX CAROTID BILATERAL;  Future  -     XR SPINE CERV 4 OR 5 V; Future

## 2019-12-17 NOTE — PATIENT INSTRUCTIONS
Arrive at [id number] [facility] on Wednesday 12/18/19 for 8:30 carotid scan (neck). [address] Arrive at [facility] at 10:00 12/18/19 for 10:30 MRI - no metal on body You may have your xray after the MRI - appt needed

## 2019-12-17 NOTE — PROGRESS NOTES
HISTORY OF PRESENT ILLNESS  Kathleen Alvarez is a 68 y.o. female     SUMMARY:   Problem List  Date Reviewed: 12/16/2019          Codes Class Noted    Endometrial polyp ICD-10-CM: N84.0  ICD-9-CM: 621.0  3/8/2019        Endocervical polyp ICD-10-CM: N84.1  ICD-9-CM: 622.7  3/8/2019        Mass of right ovary ICD-10-CM: N83.8  ICD-9-CM: 620.8  1/22/2019        Thickened endometrium ICD-10-CM: R93.89  ICD-9-CM: 793.5  1/22/2019        Advance directive discussed with patient ICD-10-CM: Z71.89  ICD-9-CM: V65.49  3/13/2018    Overview Signed 3/13/2018 11:13 AM by Iggy Hoffman MD     3/13/2018 - -patient has an established AD. Will bring copy in for chart. H/O bone density study (Chronic) ICD-10-CM: Z92.89  ICD-9-CM: V15.89  9/9/2016    Overview Addendum 9/11/2019  1:09 PM by Iggy Hoffman MD     [facility]'s [place],9/4/19- osteopenia. Followed by Dr. 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 Polyps removed. Repeat done 12/2016, polyps removed; follow-up in 3 years.              History of screening mammography (Chronic) ICD-10-CM: E84.57  ICD-9-CM: V15.89  7/10/2012    Overview Addendum 3/13/2018 11:09 AM by Iggy Hoffman MD     [facility], 6/2017             Pap smear for cervical cancer screening (Chronic) ICD-10-CM: Z12.4  ICD-9-CM: V76.2  7/10/2012    Overview Addendum 3/10/2017 11:02 AM by Iggy Hoffman MD 6/16, [address]             Hypothyroid ICD-10-CM: E03.9  ICD-9-CM: 244.9  12/7/2011        Depression ICD-10-CM: F32.9  ICD-9-CM: 219  12/7/2011        HTN (hypertension) ICD-10-CM: I10  ICD-9-CM: 401.9  12/7/2011        Fibrocystic breast ICD-10-CM: N60.19  ICD-9-CM: 610.1  12/7/2011        S/p tibial fracture ICD-10-CM: Z87.81  ICD-9-CM: V54.16  12/7/2011    Overview Signed 12/7/2011  2:17 PM by Yolanda Rosa MD     Right with venkatesh placement, 1995             S/P hemorrhoidectomy ICD-10-CM: M34.003, Z87.19  ICD-9-CM: V45.89  12/7/2011    Overview Signed 12/7/2011  2:18 PM by MD Dr. Gina Krause             Environmental allergies ICD-10-CM: Z91.09  ICD-9-CM: V15.09  12/7/2011        S/P laparoscopic cholecystectomy ICD-10-CM: C01.06  ICD-9-CM: V45.89  12/7/2011        Hiatal hernia ICD-10-CM: K44.9  ICD-9-CM: 553.3  12/7/2011    Overview Signed 12/7/2011  2:18 PM by Yolanda Rosa MD     egd 11/9/10, Dr. Rroy Moreno Medications on File Prior to Visit   Medication Sig    calcium carbonate (TUMS) 200 mg calcium (500 mg) chew Take 1 Tab by mouth daily. As needed    aspirin delayed-release 81 mg tablet Take  by mouth daily.  simvastatin (ZOCOR) 40 mg tablet TAKE 1 TABLET NIGHTLY    SYNTHROID 100 mcg tablet TAKE 1 TABLET DAILY BEFORE BREAKFAST    FLUoxetine (PROZAC) 20 mg capsule TAKE 1 CAPSULE DAILY    coenzyme q10 300 mg cap Take 300 mg by mouth daily.  LACTOBACILLUS ACIDOPHILUS (PROBIOTIC PO) Take 1 Cap by mouth daily.  BOSWELLIA SURENDRA EXTRACT (BOSWELLIA SURENDRA XT, BULK,) Take  by mouth daily. Anti-inflammatory supplement. Takes one cap po daily.  MULTIVITAMIN PO Take  by mouth daily. Takes one po daily. No current facility-administered medications on file prior to visit.         CARDIOLOGY STUDIES TO DATE:  12/11 normal stress echo at [facility] (notes reviewed and summarized under media tab)    5/16 score 188, 80th percentile  6/17 normal stress echo  9/19 normal stress echo    Chief Complaint   Patient presents with    Cholesterol Problem     HPI :  Ms. Lizette Navarrete has had some concerns for the last three or four weeks. She is getting some intermittent tingling sensation in her left upper shoulder and left upper arm that feels to her like an electrical shock sort of sensation. At times, she feels some soreness as well in the left side of her neck associated with this. No weakness and no neck pain and there are no associated symptoms. Nothing seems to make it better or worse and she remains active going up and down the stairs, shopping, working around her house without any pattern to this current symptom. She does not get any regular exercise.        CARDIAC ROS:   negative for chest pain, palpitations, syncope, orthopnea, paroxysmal nocturnal dyspnea, exertional chest pressure/discomfort, claudication, lower extremity edema    Family History   Problem Relation Age of Onset    Alcohol abuse Mother         cirrhosis    Alcohol abuse Father     Other Father         cerebral hemorrhage    Cancer Brother         prostate    Heart Disease Brother 46        MI(age 46), bypass(66)    Diabetes Maternal Aunt     Hypertension Maternal Aunt     Cancer Maternal Uncle         COLON    Heart Disease Maternal Grandmother         MI    Cancer Cousin         BREAST    Anesth Problems Neg Hx        Past Medical History:   Diagnosis Date    Arthritis     lower back    Cancer ([facility] [address]     BCCAs removed - 6 on legs    Depression     Environmental allergies 12/7/2011    Fibrocystic breast 12/7/2011    GERD (gastroesophageal reflux disease)     Hiatal hernia 12/7/2011    HIGH BLOOD PRESSURE     not on meds    HTN (hypertension) 12/7/2011    Hypothyroid 12/7/2011    Nausea & vomiting     S/P hemorrhoidectomy 12/7/2011    S/P laparoscopic cholecystectomy 12/7/2011    S/p tibial fracture 12/7/2011    Tendonitis of wrist, left 12/2017    cortisone shot    Thyroid disorder        GENERAL ROS:  A comprehensive review of systems was negative except for that written in the HPI.     Visit Vitals  /74 (BP 1 Location: Left arm, BP Patient Position: Sitting)   Pulse 71   Resp 16   Ht 5' 4\" (1.626 m)   Wt 152 lb 6.4 oz (69.1 kg)   SpO2 97%   BMI 26.16 kg/m²       Wt Readings from Last 3 Encounters:   12/17/19 152 lb 6.4 oz (69.1 kg)   10/04/19 153 lb (69.4 kg)   09/20/19 154 lb 3.2 oz (69.9 kg)            BP Readings from Last 3 Encounters:   12/17/19 116/74   10/04/19 122/84   09/20/19 138/84       PHYSICAL EXAM  General appearance: alert, cooperative, no distress, appears stated age  Neurologic: Alert and oriented X 3  Neck: supple, symmetrical, trachea midline, no adenopathy, no carotid bruit and no JVD  Lungs: clear to auscultation bilaterally  Heart: regular rate and rhythm, S1, S2 normal, no murmur, click, rub or gallop  Extremities: extremities normal, atraumatic, no cyanosis or edema    Lab Results   Component Value Date/Time    Cholesterol, total 192 05/07/2019 09:59 AM    Cholesterol, total 180 11/02/2018 10:30 AM    Cholesterol, total 194 03/13/2018 11:31 AM    Cholesterol, total 184 09/11/2017 12:00 AM    Cholesterol, total 164 03/10/2017 11:08 AM    HDL Cholesterol 65 05/07/2019 09:59 AM    HDL Cholesterol 63 11/02/2018 10:30 AM    HDL Cholesterol 66 03/13/2018 11:31 AM    HDL Cholesterol 72 09/11/2017 12:00 AM    HDL Cholesterol 73 03/10/2017 11:08 AM    LDL, calculated 105 (H) 05/07/2019 09:59 AM    LDL, calculated 94 11/02/2018 10:30 AM    LDL, calculated 96 03/13/2018 11:31 AM    LDL, calculated 87 09/11/2017 12:00 AM    LDL, calculated 70 03/10/2017 11:08 AM    Triglyceride 110 05/07/2019 09:59 AM    Triglyceride 114 11/02/2018 10:30 AM    Triglyceride 158 (H) 03/13/2018 11:31 AM    Triglyceride 123 09/11/2017 12:00 AM    Triglyceride 104 03/10/2017 11:08 AM    CHOL/HDL Ratio 2.4 07/15/2010 11:10 AM     ASSESSMENT  Given the description and pattern of her symptoms, this does not sound cardiac and if anything it sounds musculoskeletal or neurologic, so we talked about that at length. It is not limiting her in any way and it is not associated with any other symptoms. Going forward, if this or any other discomfort in her arm or chest starts to become associated with other symptoms and is prolonged she needs to call 911. I told her she might want to check in with her primary care physician and see if they have any other thoughts on what this might be.        current treatment plan is effective, no change in therapy  lab results and schedule of future lab studies reviewed with patient  reviewed diet, exercise and weight control    Encounter Diagnoses   Name Primary?  Agatston CAC score 100-199 Yes    Mixed hyperlipidemia     Essential hypertension     Family history of premature CAD      Orders Placed This Encounter    calcium carbonate (TUMS) 200 mg calcium (500 mg) chew    aspirin delayed-release 81 mg tablet       Follow-up and Dispositions    · Return in about 6 months (around 6/17/2020).          Liberty See MD  12/17/2019

## 2019-12-17 NOTE — PROGRESS NOTES
Chief Complaint   Patient presents with    Tingling     x 3 weeks/shoulder    Numbness     Jaw     1. Have you been to the ER, urgent care clinic since your last visit? Hospitalized since your last visit? No    2. Have you seen or consulted any other health care providers outside of the [address] [place] [address] since your last visit? Include any pap smears or colon screening.  No

## 2019-12-18 ENCOUNTER — HOSPITAL ENCOUNTER (OUTPATIENT)
Dept: MRI IMAGING | Age: 69
Discharge: HOME | End: 2019-12-18
Attending: INTERNAL MEDICINE
Payer: MEDICARE

## 2019-12-18 ENCOUNTER — HOSPITAL ENCOUNTER (OUTPATIENT)
Dept: VASCULAR SURGERY | Age: 69
Discharge: HOME | End: 2019-12-18
Attending: INTERNAL MEDICINE
Payer: MEDICARE

## 2019-12-18 DIAGNOSIS — R20.2 ARM PARESTHESIA, LEFT: ICD-10-CM

## 2019-12-18 DIAGNOSIS — R20.0 LEFT JAW NUMBNESS: ICD-10-CM

## 2019-12-18 LAB
LEFT ARM BP: 145 MMHG
LEFT CCA DIST DIAS: 14.2 CM/S
LEFT CCA DIST SYS: 65.8 CM/S
LEFT CCA PROX DIAS: 13 CM/S
LEFT CCA PROX SYS: 86.7 CM/S
LEFT ECA DIAS: 13.1 CM/S
LEFT ECA SYS: 137.2 CM/S
LEFT ICA DIST DIAS: 27.7 CM/S
LEFT ICA DIST SYS: 87.5 CM/S
LEFT ICA MID DIAS: 21 CM/S
LEFT ICA MID SYS: 83 CM/S
LEFT ICA PROX DIAS: 16.7 CM/S
LEFT ICA PROX SYS: 72 CM/S
LEFT ICA/CCA SYS: 1.3
LEFT VERTEBRAL DIAS: 12.7 CM/S
LEFT VERTEBRAL SYS: 43.9 CM/S
RIGHT ARM BP: 150 MMHG
RIGHT CCA DIST DIAS: 17.7 CM/S
RIGHT CCA DIST SYS: 67.2 CM/S
RIGHT CCA PROX DIAS: 15.1 CM/S
RIGHT CCA PROX SYS: 81.6 CM/S
RIGHT ECA DIAS: 12.5 CM/S
RIGHT ECA SYS: 106.3 CM/S
RIGHT ICA DIST DIAS: 17.9 CM/S
RIGHT ICA DIST SYS: 65.8 CM/S
RIGHT ICA MID DIAS: 21.5 CM/S
RIGHT ICA MID SYS: 68.8 CM/S
RIGHT ICA PROX DIAS: 14.9 CM/S
RIGHT ICA PROX SYS: 57.8 CM/S
RIGHT ICA/CCA SYS: 1
RIGHT VERTEBRAL DIAS: 13.8 CM/S
RIGHT VERTEBRAL SYS: 56.7 CM/S

## 2019-12-18 PROCEDURE — 70551 MRI BRAIN STEM W/O DYE: CPT

## 2019-12-18 PROCEDURE — 93880 EXTRACRANIAL BILAT STUDY: CPT

## 2019-12-19 ENCOUNTER — HOSPITAL ENCOUNTER (OUTPATIENT)
Dept: GENERAL RADIOLOGY | Age: 69
Discharge: HOME | End: 2019-12-19
Attending: INTERNAL MEDICINE
Payer: MEDICARE

## 2019-12-19 ENCOUNTER — TELEPHONE (OUTPATIENT)
Dept: INTERNAL MEDICINE CLINIC | Age: 69
End: 2019-12-19

## 2019-12-19 DIAGNOSIS — R20.2 ARM PARESTHESIA, LEFT: ICD-10-CM

## 2019-12-19 DIAGNOSIS — M47.819 SPINAL ARTHRITIS: Primary | ICD-10-CM

## 2019-12-19 DIAGNOSIS — R20.0 LEFT JAW NUMBNESS: ICD-10-CM

## 2019-12-19 PROCEDURE — 72050 X-RAY EXAM NECK SPINE 4/5VWS: CPT

## 2019-12-19 RX ORDER — PREDNISONE 10 MG/1
TABLET ORAL
Qty: 25 TAB | Refills: 0 | Status: SHIPPED | OUTPATIENT
Start: 2019-12-19 | End: 2020-08-07

## 2019-12-19 NOTE — TELEPHONE ENCOUNTER
Informed patient per provider result note. Sending medication in today. Patient verbalized understanding.

## 2019-12-19 NOTE — PROGRESS NOTES
Call- there's mild to moderate spinal arthritis, advise prednisone 10 mg , 25, no rf.  Day 1- 4 tabs ASAP,Day 2- 4 tabs,Day 3- 4 tabs,Day 4 - 3 tabs,Day 5 - 3 tab Day 6 -  2 tabs,Day 7 - 2 tabs,Day 8 - 1 tab,Day 9 - 1 tab,Day 10 - 1/2 tab,Day 11- 1/2 tab.  follow up with Dr Franny Yin if persistent sx

## 2019-12-19 NOTE — TELEPHONE ENCOUNTER
----- Message from Tessa Gomez MD sent at 12/19/2019  1:54 PM EST -----  Call- tests are fine, no stroke, blocked arteries. Did she do the plain film cervical spine xrays ?

## 2020-02-04 ENCOUNTER — HOSPITAL ENCOUNTER (OUTPATIENT)
Dept: LAB | Age: 70
Discharge: HOME | End: 2020-02-04
Payer: MEDICARE

## 2020-02-04 ENCOUNTER — OFFICE VISIT (OUTPATIENT)
Dept: INTERNAL MEDICINE CLINIC | Age: 70
End: 2020-02-04

## 2020-02-04 VITALS
SYSTOLIC BLOOD PRESSURE: 122 MMHG | RESPIRATION RATE: 16 BRPM | DIASTOLIC BLOOD PRESSURE: 72 MMHG | WEIGHT: 151 LBS | HEIGHT: 64 IN | BODY MASS INDEX: 25.78 KG/M2 | TEMPERATURE: 97.8 F | OXYGEN SATURATION: 99 % | HEART RATE: 58 BPM

## 2020-02-04 DIAGNOSIS — E03.9 HYPOTHYROIDISM, ADULT: ICD-10-CM

## 2020-02-04 DIAGNOSIS — E78.2 MIXED HYPERLIPIDEMIA: ICD-10-CM

## 2020-02-04 DIAGNOSIS — I10 BENIGN HYPERTENSION: Primary | ICD-10-CM

## 2020-02-04 DIAGNOSIS — Z79.899 ENCOUNTER FOR LONG-TERM (CURRENT) USE OF MEDICATIONS: ICD-10-CM

## 2020-02-04 DIAGNOSIS — M25.512 LEFT SHOULDER PAIN, UNSPECIFIED CHRONICITY: ICD-10-CM

## 2020-02-04 PROCEDURE — 84443 ASSAY THYROID STIM HORMONE: CPT

## 2020-02-04 PROCEDURE — 85025 COMPLETE CBC W/AUTO DIFF WBC: CPT

## 2020-02-04 PROCEDURE — 80061 LIPID PANEL: CPT

## 2020-02-04 PROCEDURE — 36415 COLL VENOUS BLD VENIPUNCTURE: CPT

## 2020-02-04 PROCEDURE — 84439 ASSAY OF FREE THYROXINE: CPT

## 2020-02-04 PROCEDURE — 80053 COMPREHEN METABOLIC PANEL: CPT

## 2020-02-04 NOTE — PROGRESS NOTES
Verified name and birth date for privacy precautions. Chart reviewed in preparation for today's visit.      Chief Complaint   Patient presents with    Hypertension     6 month f/u    Cholesterol Problem     6 month f/u          Health Maintenance Due   Topic    Shingrix Vaccine Age 50> (1 of 2)    GLAUCOMA SCREENING Q2Y          Wt Readings from Last 3 Encounters:   02/04/20 151 lb (68.5 kg)   12/17/19 152 lb 12.8 oz (69.3 kg)   12/17/19 152 lb 6.4 oz (69.1 kg)     Temp Readings from Last 3 Encounters:   02/04/20 97.8 °F (36.6 °C) (Oral)   12/17/19 97.9 °F (36.6 °C) (Oral)   10/04/19 97.9 °F (36.6 °C) (Oral)     BP Readings from Last 3 Encounters:   02/04/20 122/72   12/17/19 136/80   12/17/19 116/74     Pulse Readings from Last 3 Encounters:   02/04/20 (!) 58   12/17/19 67   12/17/19 71         Learning Assessment:  :     Learning Assessment 5/7/2019 3/13/2018 2/4/2015 1/28/2014 1/20/2014 1/10/2013   PRIMARY LEARNER Patient Patient Patient Patient Patient Patient   HIGHEST LEVEL OF EDUCATION - PRIMARY LEARNER  GRADUATED HIGH SCHOOL OR GED GRADUATED HIGH SCHOOL OR GED GRADUATED HIGH SCHOOL OR GED GRADUATED HIGH SCHOOL OR GED GRADUATED HIGH SCHOOL OR GED -   BARRIERS PRIMARY LEARNER NONE NONE NONE NONE NONE -   CO-LEARNER CAREGIVER No No No No No -   PRIMARY LANGUAGE ENGLISH ENGLISH ENGLISH ENGLISH ENGLISH ENGLISH    NEED - - No - - -   LEARNER PREFERENCE PRIMARY VIDEOS DEMONSTRATION LISTENING LISTENING DEMONSTRATION DEMONSTRATION     - - DEMONSTRATION DEMONSTRATION - -   LEARNING SPECIAL TOPICS - - no - - -   ANSWERED BY patient patient patient Bossman [place] patient patient   RELATIONSHIP SELF SELF SELF SELF SELF SELF       Depression Screening:  :     3 most recent [facility] Screens 3/15/2019   PHQ Not Done -   Little interest or pleasure in doing things Not at all   Feeling down, depressed, irritable, or hopeless Not at all   Total Score PHQ 2 0       Fall Risk Assessment:  :     Fall Risk Assessment, last 12 mths 12/17/2019   Able to walk? Yes   Fall in past 12 months? No       Abuse Screening:  :     Abuse Screening Questionnaire 3/15/2019 7/6/2018 9/11/2017 3/16/2015 3/18/2014   Do you ever feel afraid of your partner? N N N N N   Are you in a relationship with someone who physically or mentally threatens you? N N N N N   Is it safe for you to go home? Y Y Y Y Y       Coordination of Care Questionnaire:  :     1) Have you been to an emergency room, urgent care clinic since your last visit? no   Hospitalized since your last visit? no             2) Have you seen or consulted any other health care providers outside of [address] [place] [address] since your last visit? yes, Dr. Bernadette Sharpe  (Include any pap smears or colon screenings in this section.)    3) Do you have an Advance Directive on file?  yes      Patient is currently accompanied by her self.     ------------------------------------------------

## 2020-02-04 NOTE — PROGRESS NOTES
Subjective: Franny Steel is a 68 y.o. female who presents today for follow up of her hypertension, hyperlipidemia and hypothyroid. She was seeb ib 12.17//2019 with arm pain and jaw numbness. MRI brain, carotid dopplers completed. Both did not show any acute disease. Cervical xray showed arthritis. She is still having some left shoulder pain. No exercise. Has used advil prn    She saw Dr. Es Castle and had skin lesions removed - basal cell. colonoscopy done with Dr. Abelardo Ham in January, 2020. Polyps removed. Due for:  -shingrix - not done yet  -eye exam - about 11/2019. Dr. Roma Lo    Patient Active Problem List   Diagnosis Code    Hypothyroid E03.9    Depression F32.9    HTN (hypertension) I10    Fibrocystic breast N60.19    S/p tibial fracture Z87.81    S/P hemorrhoidectomy Z98.890, Z87.19    Environmental allergies Z91.09    S/P laparoscopic cholecystectomy Z90.49    Hiatal hernia K44.9    Colon cancer screening Z12.11    History of screening mammography Z92.89    Pap smear for cervical cancer screening Z12.4    Hyperlipidemia E78.5    Nuclear cataract [id number]    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    Agatston CAC score 100-199 R93.1    H/O bone density study Z92.89    Advance directive discussed with patient Z70.80    Mass of right ovary N83.8    Thickened endometrium R93.89    Endometrial polyp N84.0    Endocervical polyp N84.1     Current Outpatient Medications   Medication Sig Dispense Refill    calcium carbonate (TUMS) 200 mg calcium (500 mg) chew Take 1 Tab by mouth daily. As needed      aspirin delayed-release 81 mg tablet Take  by mouth daily.  simvastatin (ZOCOR) 40 mg tablet TAKE 1 TABLET NIGHTLY 90 Tab 1    SYNTHROID 100 mcg tablet TAKE 1 TABLET DAILY BEFORE BREAKFAST 90 Tab 1    FLUoxetine (PROZAC) 20 mg capsule TAKE 1 CAPSULE DAILY 90 Cap 1    coenzyme q10 300 mg cap Take 300 mg by mouth daily.       LACTOBACILLUS ACIDOPHILUS (PROBIOTIC PO) Take 1 Cap by mouth daily.  BOSWELLIA SURENDRA EXTRACT (BOSWELLIA SURENDRA XT, BULK,) Take  by mouth daily. Anti-inflammatory supplement. Takes one cap po daily.  MULTIVITAMIN PO Take  by mouth daily. Takes one po daily.  predniSONE (DELTASONE) 10 mg tablet Day 1- 4 tabs ASAP,Day 2- 4 tabs,Day 3- 4 tabs,Day 4 - 3 tabs,Day 5 - 3 tab Day 6 -  2 tabs,Day 7 - 2 tabs,Day 8 - 1 tab,Day 9 - 1 tab,Day 10 - 1/2 tab,Day 11- 1/2 tab. (Patient taking differently: Day 1- 4 tabs ASAP,Day 2- 4 tabs,Day 3- 4 tabs,Day 4 - 3 tabs,Day 5 - 3 tab Day 6 -  2 tabs,Day 7 - 2 tabs,Day 8 - 1 tab,Day 9 - 1 tab,Day 10 - 1/2 tab,Day 11- 1/2 tab. Pt reports she never received this medication.) 25 Tab 0        Review of Systems    Pertinent items are noted in HPI. Objective:     Visit Vitals  /72 (BP 1 Location: Left arm, BP Patient Position: Sitting)   Pulse (!) 58   Temp 97.8 °F (36.6 °C) (Oral)   Resp 16   Ht 5' 3.78\" (1.62 m)   Wt 151 lb (68.5 kg)   SpO2 99%   BMI 26.10 kg/m²     General appearance: alert, cooperative, no distress, appears stated age  Head: Normocephalic, without obvious abnormality, atraumatic  Neck: supple, symmetrical, trachea midline, no adenopathy, thyroid: not enlarged, symmetric, no tenderness/mass/nodules, no carotid bruit and no JVD  Lungs: clear to auscultation bilaterally  Heart: regular rate and rhythm, S1, S2 normal, no murmur, click, rub or gallop  Extremities: extremities normal, atraumatic, no cyanosis or edema    Assessment/Plan:     1. Benign hypertension  -I evaluated and recommended to continue current doses of medications. - CBC WITH AUTOMATED DIFF  - METABOLIC PANEL, COMPREHENSIVE    2. Mixed hyperlipidemia  -due for fasting lipid panel at this time.     - METABOLIC PANEL, COMPREHENSIVE  - LIPID PANEL    3.  Hypothyroidism, adult  -clinically euthyroid    - TSH 3RD GENERATION  - T4, FREE    4. Encounter for long-term (current) use of medications    - CBC WITH AUTOMATED DIFF  - METABOLIC PANEL, COMPREHENSIVE  - LIPID PANEL  - TSH 3RD GENERATION  - T4, FREE    5. Left shoulder pain, unspecified chronicity  -has mild to moderate multilevel arthritis in cervical spine  -encouraged regular exercise and stretches  -aleve 2 tablets with food for next 7 days. Follow-up Disposition:     Follow up in 6 months     Return if symptoms worsen or fail to improve. Advised patient to call back or return to office if symptoms worsen/change/persist.     Discussed expected course/resolution/complications of diagnosis in detail with patient. Medication risks/benefits/costs/interactions/alternatives discussed with patient. Patient was given an after visit summary which includes diagnoses, current medications, & vitals. Patient expressed understanding with the diagnosis and plan.

## 2020-02-05 LAB
ALBUMIN SERPL-MCNC: 4.6 G/DL (ref 3.7–4.7)
ALBUMIN/GLOB SERPL: 2.7 {RATIO} (ref 1.2–2.2)
ALP SERPL-CCNC: 96 IU/L (ref 39–117)
ALT SERPL-CCNC: 22 IU/L (ref 0–32)
AST SERPL-CCNC: 21 IU/L (ref 0–40)
BASOPHILS # BLD AUTO: 0.1 X10E3/UL (ref 0–0.2)
BASOPHILS NFR BLD AUTO: 1 %
BILIRUB SERPL-MCNC: 0.5 MG/DL (ref 0–1.2)
BUN SERPL-MCNC: 18 MG/DL (ref 8–27)
BUN/CREAT SERPL: 25 (ref 12–28)
CALCIUM SERPL-MCNC: 9.7 MG/DL (ref 8.7–10.3)
CHLORIDE SERPL-SCNC: 103 MMOL/L (ref 96–106)
CHOLEST SERPL-MCNC: 183 MG/DL (ref 100–199)
CO2 SERPL-SCNC: 26 MMOL/L (ref 20–29)
CREAT SERPL-MCNC: 0.71 MG/DL (ref 0.57–1)
EOSINOPHIL # BLD AUTO: 0.1 X10E3/UL (ref 0–0.4)
EOSINOPHIL NFR BLD AUTO: 2 %
ERYTHROCYTE [DISTWIDTH] IN BLOOD BY AUTOMATED COUNT: 11.9 % (ref 11.7–15.4)
GLOBULIN SER CALC-MCNC: 1.7 G/DL (ref 1.5–4.5)
GLUCOSE SERPL-MCNC: 85 MG/DL (ref 65–99)
HCT VFR BLD AUTO: 40.6 % (ref 34–46.6)
HDLC SERPL-MCNC: 63 MG/DL
HGB BLD-MCNC: 13.8 G/DL (ref 11.1–15.9)
IMM GRANULOCYTES # BLD AUTO: 0 X10E3/UL (ref 0–0.1)
IMM GRANULOCYTES NFR BLD AUTO: 0 %
LDLC SERPL CALC-MCNC: 104 MG/DL (ref 0–99)
LYMPHOCYTES # BLD AUTO: 1.3 X10E3/UL (ref 0.7–3.1)
LYMPHOCYTES NFR BLD AUTO: 30 %
MCH RBC QN AUTO: 32.7 PG (ref 26.6–33)
MCHC RBC AUTO-ENTMCNC: 34 G/DL (ref 31.5–35.7)
MCV RBC AUTO: 96 FL (ref 79–97)
MONOCYTES # BLD AUTO: 0.5 X10E3/UL (ref 0.1–0.9)
MONOCYTES NFR BLD AUTO: 12 %
NEUTROPHILS # BLD AUTO: 2.4 X10E3/UL (ref 1.4–7)
NEUTROPHILS NFR BLD AUTO: 55 %
PLATELET # BLD AUTO: 304 X10E3/UL (ref 150–450)
POTASSIUM SERPL-SCNC: 5.2 MMOL/L (ref 3.5–5.2)
PROT SERPL-MCNC: 6.3 G/DL (ref 6–8.5)
RBC # BLD AUTO: 4.22 X10E6/UL (ref 3.77–5.28)
SODIUM SERPL-SCNC: 145 MMOL/L (ref 134–144)
T4 FREE SERPL-MCNC: 1.7 NG/DL (ref 0.82–1.77)
TRIGL SERPL-MCNC: 78 MG/DL (ref 0–149)
TSH SERPL DL<=0.005 MIU/L-ACNC: 0.67 UIU/ML (ref 0.45–4.5)
VLDLC SERPL CALC-MCNC: 16 MG/DL (ref 5–40)
WBC # BLD AUTO: 4.4 X10E3/UL (ref 3.4–10.8)

## 2020-05-11 RX ORDER — LEVOTHYROXINE SODIUM 100 UG/1
100 TABLET ORAL
Qty: 14 TAB | Refills: 0 | Status: SHIPPED | OUTPATIENT
Start: 2020-05-11 | End: 2020-10-26

## 2020-05-11 NOTE — TELEPHONE ENCOUNTER
Requesting a weeks worth sent to her local Rx; [facility] still in progress. Pt will run out before it gets delivered.

## 2020-08-07 ENCOUNTER — OFFICE VISIT (OUTPATIENT)
Dept: INTERNAL MEDICINE CLINIC | Age: 70
End: 2020-08-07
Payer: MEDICARE

## 2020-08-07 ENCOUNTER — HOSPITAL ENCOUNTER (OUTPATIENT)
Dept: LAB | Age: 70
Discharge: HOME | End: 2020-08-07
Payer: MEDICARE

## 2020-08-07 VITALS
DIASTOLIC BLOOD PRESSURE: 60 MMHG | RESPIRATION RATE: 16 BRPM | SYSTOLIC BLOOD PRESSURE: 110 MMHG | HEIGHT: 64 IN | HEART RATE: 66 BPM | WEIGHT: 153 LBS | BODY MASS INDEX: 26.12 KG/M2 | TEMPERATURE: 95.8 F

## 2020-08-07 DIAGNOSIS — I10 BENIGN HYPERTENSION: ICD-10-CM

## 2020-08-07 DIAGNOSIS — E78.2 MIXED HYPERLIPIDEMIA: ICD-10-CM

## 2020-08-07 DIAGNOSIS — Z13.39 SCREENING FOR ALCOHOLISM: ICD-10-CM

## 2020-08-07 DIAGNOSIS — Z79.899 ENCOUNTER FOR LONG-TERM (CURRENT) USE OF MEDICATIONS: ICD-10-CM

## 2020-08-07 DIAGNOSIS — Z13.31 SCREENING FOR DEPRESSION: ICD-10-CM

## 2020-08-07 DIAGNOSIS — Z00.00 MEDICARE ANNUAL WELLNESS VISIT, SUBSEQUENT: Primary | ICD-10-CM

## 2020-08-07 DIAGNOSIS — E03.9 HYPOTHYROIDISM, ADULT: ICD-10-CM

## 2020-08-07 PROCEDURE — 99213 OFFICE O/P EST LOW 20 MIN: CPT | Performed by: INTERNAL MEDICINE

## 2020-08-07 PROCEDURE — G9899 SCRN MAM PERF RSLTS DOC: HCPCS | Performed by: INTERNAL MEDICINE

## 2020-08-07 PROCEDURE — G8399 PT W/DXA RESULTS DOCUMENT: HCPCS | Performed by: INTERNAL MEDICINE

## 2020-08-07 PROCEDURE — G8752 SYS BP LESS 140: HCPCS | Performed by: INTERNAL MEDICINE

## 2020-08-07 PROCEDURE — 84439 ASSAY OF FREE THYROXINE: CPT

## 2020-08-07 PROCEDURE — G8536 NO DOC ELDER MAL SCRN: HCPCS | Performed by: INTERNAL MEDICINE

## 2020-08-07 PROCEDURE — G0444 DEPRESSION SCREEN ANNUAL: HCPCS | Performed by: INTERNAL MEDICINE

## 2020-08-07 PROCEDURE — G8754 DIAS BP LESS 90: HCPCS | Performed by: INTERNAL MEDICINE

## 2020-08-07 PROCEDURE — G9717 DOC PT DX DEP/BP F/U NT REQ: HCPCS | Performed by: INTERNAL MEDICINE

## 2020-08-07 PROCEDURE — G8419 CALC BMI OUT NRM PARAM NOF/U: HCPCS | Performed by: INTERNAL MEDICINE

## 2020-08-07 PROCEDURE — G8427 DOCREV CUR MEDS BY ELIG CLIN: HCPCS | Performed by: INTERNAL MEDICINE

## 2020-08-07 PROCEDURE — 1101F PT FALLS ASSESS-DOCD LE1/YR: CPT | Performed by: INTERNAL MEDICINE

## 2020-08-07 PROCEDURE — 80061 LIPID PANEL: CPT

## 2020-08-07 PROCEDURE — G0439 PPPS, SUBSEQ VISIT: HCPCS | Performed by: INTERNAL MEDICINE

## 2020-08-07 PROCEDURE — 3017F COLORECTAL CA SCREEN DOC REV: CPT | Performed by: INTERNAL MEDICINE

## 2020-08-07 PROCEDURE — 1090F PRES/ABSN URINE INCON ASSESS: CPT | Performed by: INTERNAL MEDICINE

## 2020-08-07 PROCEDURE — 84443 ASSAY THYROID STIM HORMONE: CPT

## 2020-08-07 PROCEDURE — 80053 COMPREHEN METABOLIC PANEL: CPT

## 2020-08-07 NOTE — PROGRESS NOTES
Subjective: Janis Duran is a 68 y.o. female who presents today for follow up of her hypertension, hyperlipidemia and hypothyroid. She follows with Dr. Leonardo Hernandez for dermatology. She is due for her medicare wellness exam.     She has been a little relaxed about maintaining a low fat diet. No regular exercise. 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 14 Tab 0    FLUoxetine (PROZAC) 20 mg capsule TAKE 1 CAPSULE DAILY 90 Cap 1    calcium carbonate (TUMS) 200 mg calcium (500 mg) chew Take 1 Tab by mouth daily. As needed      aspirin delayed-release 81 mg tablet Take  by mouth daily.  coenzyme q10 300 mg cap Take 300 mg by mouth daily.  LACTOBACILLUS ACIDOPHILUS (PROBIOTIC PO) Take 1 Cap by mouth daily.  BOSWELLIA SURENDRA EXTRACT (BOSWELLIA SURENDRA XT, BULK,) Take  by mouth daily. Anti-inflammatory supplement. Takes one cap po daily.  MULTIVITAMIN PO Take  by mouth daily. Takes one po daily.           Review of Systems    Pertinent items are noted in HPI. Objective: Wt Readings from Last 3 Encounters:   08/07/20 153 lb (69.4 kg)   02/04/20 151 lb (68.5 kg)   12/17/19 152 lb 12.8 oz (69.3 kg)     BP Readings from Last 3 Encounters:   08/07/20 110/60   02/04/20 122/72   12/17/19 136/80     Visit Vitals  /60 (BP 1 Location: Left arm, BP Patient Position: Sitting)   Pulse 66   Temp (!) 95.8 °F (35.4 °C) (Temporal)   Resp 16   Ht 5' 4\" (1.626 m)   Wt 153 lb (69.4 kg)   BMI 26.26 kg/m²     General appearance: alert, cooperative, no distress, appears stated age  Head: Normocephalic, without obvious abnormality, atraumatic  Lungs: clear to auscultation bilaterally  Heart: regular rate and rhythm, S1, S2 normal, no murmur, click, rub or gallop  Extremities: extremities normal, atraumatic, no cyanosis or edema  Pulses: 2+ and symmetric    Assessment/Plan:       1. Medicare annual wellness visit, subsequent  -completed today    - DEPRESSION SCREEN ANNUAL    2. Screening for alcoholism      3. Screening for depression    - DEPRESSION SCREEN ANNUAL    4. Benign hypertension  -I evaluated and recommended to continue current doses of medications. 5. Mixed hyperlipidemia  -need to get back on low fat diet. - METABOLIC PANEL, COMPREHENSIVE  - LIPID PANEL    6. Hypothyroidism, adult  -clinically euthyroid    - TSH 3RD GENERATION  - T4, FREE    7. Encounter for long-term (current) use of medications    Orders Placed This Encounter    Depression Screen Annual    METABOLIC PANEL, COMPREHENSIVE    TSH 3RD GENERATION    T4, FREE    LIPID PANEL       Follow-up Disposition:     Follow up in 6 months     Return if symptoms worsen or fail to improve. Advised patient to call back or return to office if symptoms worsen/change/persist.     Discussed expected course/resolution/complications of diagnosis in detail with patient. Medication risks/benefits/costs/interactions/alternatives discussed with patient.    Patient was given an after visit summary which includes diagnoses, current medications, & vitals. Patient expressed understanding with the diagnosis and plan. This is the Subsequent Medicare Annual Wellness Exam, performed 12 months or more after the Initial AWV or the last Subsequent AWV    I have reviewed the patient's medical history in detail and updated the computerized patient record. 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    Agatston CAC score 100-199 R93.1    H/O bone density study Z92.89    Advance directive discussed with patient Z70.80    Mass of right ovary N83.8    Thickened endometrium R93.89    Endometrial polyp N84.0    Endocervical polyp N84.1     Past Medical History:   Diagnosis Date    Arthritis     lower back    Cancer ([facility] [address]     BCCAs removed - 6 on legs    Depression     Environmental allergies 12/7/2011    Fibrocystic breast 12/7/2011    GERD (gastroesophageal reflux disease)     Hiatal hernia 12/7/2011    HIGH BLOOD PRESSURE     not on meds    HTN (hypertension) 12/7/2011    Hypothyroid 12/7/2011    Nausea & vomiting     S/P hemorrhoidectomy 12/7/2011    S/P laparoscopic cholecystectomy 12/7/2011    S/p tibial fracture 12/7/2011    Tendonitis of wrist, left 12/2017    cortisone shot    Thyroid disorder       Past Surgical History:   Procedure Laterality Date    ENDOSCOPY, COLON, DIAGNOSTIC  11/2010    ([facility])-f/u 5 yrs.     HX BREAST LUMPECTOMY      right - benign    HX CHOLECYSTECTOMY      HX GI      hemorrhoidectomy    HX GYN  03/2019    d/c for thickened endometrium    HX ORTHOPAEDIC      surgery for tibial plateau fx - took bone from hip/has hardware knee to ankle     Current Outpatient Medications   Medication Sig Dispense Refill    simvastatin (ZOCOR) 40 mg tablet TAKE 1 TABLET NIGHTLY 90 Tab 1    Synthroid 100 mcg tablet Take 1 Tab by mouth Daily (before breakfast). 14 Tab 0    FLUoxetine (PROZAC) 20 mg capsule TAKE 1 CAPSULE DAILY 90 Cap 1    calcium carbonate (TUMS) 200 mg calcium (500 mg) chew Take 1 Tab by mouth daily. As needed      aspirin delayed-release 81 mg tablet Take  by mouth daily.  coenzyme q10 300 mg cap Take 300 mg by mouth daily.  LACTOBACILLUS ACIDOPHILUS (PROBIOTIC PO) Take 1 Cap by mouth daily.  BOSWELLIA SURENDRA EXTRACT (BOSWELLIA SURENDRA XT, BULK,) Take  by mouth daily. Anti-inflammatory supplement. Takes one cap po daily.  MULTIVITAMIN PO Take  by mouth daily. Takes one po daily. Allergies   Allergen Reactions    Adhesive Hives    Cephalexin Diarrhea    E-Mycin [Erythromycin] Other (comments)     GI upset    Sudafed [Pseudoephedrine Hcl] Palpitations       Family History   Problem Relation Age of Onset    Alcohol abuse Mother         cirrhosis    Alcohol abuse Father     Other Father         cerebral hemorrhage    Cancer Brother         prostate    Heart Disease Brother 46        MI(age 46), bypass(66)    Diabetes Maternal Aunt     Hypertension Maternal Aunt     Cancer Maternal Uncle         COLON    Heart Disease Maternal Grandmother         MI    Cancer Cousin         BREAST    Anesth Problems Neg Hx      Social History     Tobacco Use    Smoking status: Never Smoker    Smokeless tobacco: Never Used   Substance Use Topics    Alcohol use:  Yes     Alcohol/week: 7.0 standard drinks     Types: 7 Glasses of wine per week       Depression Risk Factor Screening:     3 most recent PHQ Screens 8/7/2020   PHQ Not Done -   Little interest or pleasure in doing things Not at all   Feeling down, depressed, irritable, or hopeless Not at all   Total Score PHQ 2 0       Alcohol Risk Factor Screening:   Do you average 1 drink per night or more than 7 drinks a week:  No    On any one occasion in the past three months have you have had more than 3 drinks containing alcohol:  No      Functional Ability and Level of Safety:   Hearing: Hearing is good. Activities of Daily Living: The home contains: no safety equipment. Patient does total self care     Ambulation: with no difficulty     Fall Risk:  Fall Risk Assessment, last 12 mths 8/7/2020   Able to walk? Yes   Fall in past 12 months? No     Abuse Screen:  Patient is not abused       Cognitive Screening   Has your family/caregiver stated any concerns about your memory: no         Patient Care Team   Patient Care Team:  Marita Johnson MD as PCP - General (Internal Medicine)  Marita Johnson MD as PCP - [facility]  Cesario Guevara MD as Physician (Orthopedic Surgery)  Gonsalo Arechiga MD as Physician (Pain Management)  Misbah Rivas MD as Physician (Ophthalmology)  Fabián Franco MD as Physician (Gynecology)  Raeann Mendieta MD (Cardiology)    Assessment/Plan   Education and counseling provided:  Are appropriate based on today's review and evaluation    Diagnoses and all orders for this visit:    1. Medicare annual wellness visit, subsequent  -     Kel Lynch    2. Screening for alcoholism    3.  Screening for depression  -     Kel Lynch

## 2020-08-07 NOTE — PATIENT INSTRUCTIONS
Medicare Wellness Visit, Female The best way to live healthy is to have a lifestyle where you eat a well-balanced diet, exercise regularly, limit alcohol use, and quit all forms of tobacco/nicotine, if applicable. Regular preventive services are another way to keep healthy. Preventive services (vaccines, screening tests, monitoring & exams) can help personalize your care plan, which helps you manage your own care. Screening tests can find health problems at the earliest stages, when they are easiest to treat. [facility] follows the current, evidence-based guidelines published by the [facility] Seth Larsen ([facility]) when recommending preventive services for our patients. Because we follow these guidelines, sometimes recommendations change over time as research supports it. (For example, mammograms used to be recommended annually. Even though Medicare will still pay for an annual mammogram, the newer guidelines recommend a mammogram every two years for women of average risk). Of course, you and your doctor may decide to screen more often for some diseases, based on your risk and your co-morbidities (chronic disease you are already diagnosed with). Preventive services for you include: - Medicare offers their members a free annual wellness visit, which is time for you and your primary care provider to discuss and plan for your preventive service needs. Take advantage of this benefit every year! 
-All adults over the age of 72 should receive the recommended pneumonia vaccines. Current USPSTF guidelines recommend a series of two vaccines for the best pneumonia protection.  
-All adults should have a flu vaccine yearly and a tetanus vaccine every 10 years.  
-All adults age 48 and older should receive the shingles vaccines (series of two vaccines). -All adults age 38-68 who are overweight should have a diabetes screening test once every three years. -All adults born between [birth year] and 1967 should be screened once for Hepatitis C. 
-Other screening tests and preventive services for persons with diabetes include: an eye exam to screen for diabetic retinopathy, a kidney function test, a foot exam, and stricter control over your cholesterol.  
-Cardiovascular screening for adults with routine risk involves an electrocardiogram (ECG) at intervals determined by your doctor.  
-Colorectal cancer screenings should be done for adults age 54-65 with no increased risk factors for colorectal cancer. There are a number of acceptable methods of screening for this type of cancer. Each test has its own benefits and drawbacks. Discuss with your doctor what is most appropriate for you during your annual wellness visit. The different tests include: colonoscopy (considered the best screening method), a fecal occult blood test, a fecal DNA test, and sigmoidoscopy. 
 
-A bone mass density test is recommended when a woman turns 65 to screen for osteoporosis. This test is only recommended one time, as a screening. Some providers will use this same test as a disease monitoring tool if you already have osteoporosis. -Breast cancer screenings are recommended every other year for women of normal risk, age 54-69. 
-Cervical cancer screenings for women over age 72 are only recommended with certain risk factors.

## 2020-08-07 NOTE — PROGRESS NOTES
Verified name and birth date for privacy precautions. Chart reviewed in preparation for today's visit. Chief Complaint   Patient presents with    Hypertension          Health Maintenance Due   Topic    GLAUCOMA SCREENING Q2Y     Medicare Yearly Exam     Influenza Age 5 to Adult          Wt Readings from Last 3 Encounters:   08/07/20 153 lb (69.4 kg)   02/04/20 151 lb (68.5 kg)   12/17/19 152 lb 12.8 oz (69.3 kg)     Temp Readings from Last 3 Encounters:   08/07/20 (!) 95.8 °F (35.4 °C) (Temporal)   02/04/20 97.8 °F (36.6 °C) (Oral)   12/17/19 97.9 °F (36.6 °C) (Oral)     BP Readings from Last 3 Encounters:   08/07/20 110/60   02/04/20 122/72   12/17/19 136/80     Pulse Readings from Last 3 Encounters:   08/07/20 66   02/04/20 (!) 58   12/17/19 67         Learning Assessment:  :     Learning Assessment 5/7/2019 3/13/2018 2/4/2015 1/28/2014 1/20/2014 1/10/2013   PRIMARY LEARNER Patient Patient Patient Patient Patient Patient   HIGHEST LEVEL OF EDUCATION - PRIMARY LEARNER  GRADUATED HIGH SCHOOL OR GED GRADUATED HIGH SCHOOL OR GED GRADUATED HIGH SCHOOL OR GED GRADUATED HIGH SCHOOL OR GED GRADUATED HIGH SCHOOL OR GED -   BARRIERS PRIMARY LEARNER NONE NONE NONE NONE NONE -   CO-LEARNER CAREGIVER No No No No No -   PRIMARY LANGUAGE ENGLISH ENGLISH ENGLISH ENGLISH ENGLISH ENGLISH    NEED - - No - - -   LEARNER PREFERENCE PRIMARY VIDEOS DEMONSTRATION LISTENING LISTENING DEMONSTRATION DEMONSTRATION     - - DEMONSTRATION DEMONSTRATION - -   LEARNING SPECIAL TOPICS - - no - - -   ANSWERED BY patient patient patient Ariana Salazaron patient patient   RELATIONSHIP SELF SELF SELF SELF SELF SELF       Depression Screening:  :     3 most recent PHQ Screens 8/7/2020   PHQ Not Done -   Little interest or pleasure in doing things Not at all   Feeling down, depressed, irritable, or hopeless Not at all   Total Score PHQ 2 0       Fall Risk Assessment:  :     Fall Risk Assessment, last 12 mths 8/7/2020   Able to walk?  Yes Fall in past 12 months? No       Abuse Screening:  :     Abuse Screening Questionnaire 8/7/2020 3/15/2019 7/6/2018 9/11/2017 3/16/2015 3/18/2014   Do you ever feel afraid of your partner? N N N N N N   Are you in a relationship with someone who physically or mentally threatens you? N N N N N N   Is it safe for you to go home?  Callie Hatch

## 2020-08-10 LAB
ALBUMIN SERPL-MCNC: 4.7 G/DL (ref 3.7–4.7)
ALBUMIN/GLOB SERPL: 2.2 {RATIO} (ref 1.2–2.2)
ALP SERPL-CCNC: 99 IU/L (ref 39–117)
ALT SERPL-CCNC: 39 IU/L (ref 0–32)
AST SERPL-CCNC: 37 IU/L (ref 0–40)
BILIRUB SERPL-MCNC: 0.5 MG/DL (ref 0–1.2)
BUN SERPL-MCNC: 13 MG/DL (ref 8–27)
BUN/CREAT SERPL: 18 (ref 12–28)
CALCIUM SERPL-MCNC: 10 MG/DL (ref 8.7–10.3)
CHLORIDE SERPL-SCNC: 98 MMOL/L (ref 96–106)
CHOLEST SERPL-MCNC: 229 MG/DL (ref 100–199)
CO2 SERPL-SCNC: 25 MMOL/L (ref 20–29)
CREAT SERPL-MCNC: 0.73 MG/DL (ref 0.57–1)
GLOBULIN SER CALC-MCNC: 2.1 G/DL (ref 1.5–4.5)
GLUCOSE SERPL-MCNC: 93 MG/DL (ref 65–99)
HDLC SERPL-MCNC: 79 MG/DL
LDLC SERPL CALC-MCNC: 116 MG/DL (ref 0–99)
POTASSIUM SERPL-SCNC: 5 MMOL/L (ref 3.5–5.2)
PROT SERPL-MCNC: 6.8 G/DL (ref 6–8.5)
SODIUM SERPL-SCNC: 139 MMOL/L (ref 134–144)
SPECIMEN STATUS REPORT, ROLRST: NORMAL
T4 FREE SERPL-MCNC: 1.57 NG/DL (ref 0.82–1.77)
TRIGL SERPL-MCNC: 169 MG/DL (ref 0–149)
TSH SERPL DL<=0.005 MIU/L-ACNC: 2.23 UIU/ML (ref 0.45–4.5)
VLDLC SERPL CALC-MCNC: 34 MG/DL (ref 5–40)

## 2020-08-24 NOTE — TELEPHONE ENCOUNTER
Requested Prescriptions     Pending Prescriptions Disp Refills    FLUoxetine (PROzac) 20 mg capsule 90 Cap [address] [place] [address] [facility]  [phone]

## 2020-08-25 RX ORDER — FLUOXETINE HYDROCHLORIDE 20 MG/1
CAPSULE ORAL
Qty: 90 CAP | Refills: 1 | Status: SHIPPED | COMMUNITY
Start: 2020-08-25 | End: 2021-02-17

## 2020-09-09 NOTE — PROGRESS NOTES
Check up for a ovarian mass. Pt states no abnormal spotting, bleeding or pain. 1. Have you been to the ER, urgent care clinic since your last visit? Hospitalized since your last visit?no    2. Have you seen or consulted any other health care providers outside of the [address] [place] [address] since your last visit? Include any pap smears or colon screening.  no

## 2020-09-10 ENCOUNTER — OFFICE VISIT (OUTPATIENT)
Dept: GYNECOLOGY | Age: 70
End: 2020-09-10
Payer: MEDICARE

## 2020-09-10 VITALS
HEART RATE: 64 BPM | HEIGHT: 64 IN | SYSTOLIC BLOOD PRESSURE: 158 MMHG | DIASTOLIC BLOOD PRESSURE: 76 MMHG | WEIGHT: 153 LBS | BODY MASS INDEX: 26.12 KG/M2

## 2020-09-10 DIAGNOSIS — N83.8 MASS OF RIGHT OVARY: ICD-10-CM

## 2020-09-10 DIAGNOSIS — N83.8 MASS OF RIGHT OVARY: Primary | ICD-10-CM

## 2020-09-10 PROCEDURE — G9717 DOC PT DX DEP/BP F/U NT REQ: HCPCS | Performed by: OBSTETRICS & GYNECOLOGY

## 2020-09-10 PROCEDURE — G8754 DIAS BP LESS 90: HCPCS | Performed by: OBSTETRICS & GYNECOLOGY

## 2020-09-10 PROCEDURE — 1101F PT FALLS ASSESS-DOCD LE1/YR: CPT | Performed by: OBSTETRICS & GYNECOLOGY

## 2020-09-10 PROCEDURE — G8399 PT W/DXA RESULTS DOCUMENT: HCPCS | Performed by: OBSTETRICS & GYNECOLOGY

## 2020-09-10 PROCEDURE — G8753 SYS BP > OR = 140: HCPCS | Performed by: OBSTETRICS & GYNECOLOGY

## 2020-09-10 PROCEDURE — G9899 SCRN MAM PERF RSLTS DOC: HCPCS | Performed by: OBSTETRICS & GYNECOLOGY

## 2020-09-10 PROCEDURE — 99214 OFFICE O/P EST MOD 30 MIN: CPT | Performed by: OBSTETRICS & GYNECOLOGY

## 2020-09-10 PROCEDURE — G8419 CALC BMI OUT NRM PARAM NOF/U: HCPCS | Performed by: OBSTETRICS & GYNECOLOGY

## 2020-09-10 PROCEDURE — G8536 NO DOC ELDER MAL SCRN: HCPCS | Performed by: OBSTETRICS & GYNECOLOGY

## 2020-09-10 PROCEDURE — 1090F PRES/ABSN URINE INCON ASSESS: CPT | Performed by: OBSTETRICS & GYNECOLOGY

## 2020-09-10 PROCEDURE — G0463 HOSPITAL OUTPT CLINIC VISIT: HCPCS | Performed by: OBSTETRICS & GYNECOLOGY

## 2020-09-10 PROCEDURE — G8427 DOCREV CUR MEDS BY ELIG CLIN: HCPCS | Performed by: OBSTETRICS & GYNECOLOGY

## 2020-09-10 PROCEDURE — 3017F COLORECTAL CA SCREEN DOC REV: CPT | Performed by: OBSTETRICS & GYNECOLOGY

## 2020-09-10 NOTE — PROGRESS NOTES
[facility] [address], [address]  P [phone]  F [phone]    Office Note  Patient ID:  Name:  Rebel Lawrence  MRN:  [id number]  :  1950/69 y.o. Date:  9/10/2020      HISTORY OF PRESENT ILLNESS:  Rebel Lawrence is a 68 y.o.  postmenopausal female who was referred to me for a right ovarian mass by Dr. Marcelino Patel. She was seen in the ER at [facility] in 2018 with abdominal pain, nausea/vomiting, and diarrhea. A CT scan suggested possible enterocolitis, but no obstruction. She was noted to have an incidental findings of a 4.9 cm right ovarian cyst.  She subsequently followed up with Dr. Moi Bustillos.  She was sent for a pelvic ultrasound to better evaluated the ovarian cyst.  On the ultrasound it measured 4.8 cm. It was simple and well-circumscribed. Doppler flow was normal.  The contralateral ovary appeared normal.  There was no free fluid in the cul de sac. The endometrium, however, was a bit thickened, measuring 8 mm. She denies any vaginal bleeding or discharge. Her abdominal symptoms have now resolved. She denies a family history of gynecologic malignancy. I reassured her that the cystic ovarian mass on the right was most likely benign, as it appeared simple, was not that large, and had normal doppler flow. I discussed options with her, including observation and surgical removal.  I felt that both were reasonable. She would like to avoid a big surgery if possible. I suggested that we just repeat an ultrasound if a couple of months to make sure that it remains stable in size and appearance. If things change, we can then consider surgical excision. As for the thickened endometrium on ultrasound, I explained that in a postmenopausal woman this requires evaluation. I suggested a hysteroscopy/D&C to evaluate. This was performed on 3/8/19.      Operative findings:  Large endometrial polyp arising from superior and lateral aspect of the right uterine wall, near the right tubal os. Smaller sessile polyp near the left tubal os. Multiple small cervical polyps. Nothing suspicious for malignancy. FINAL PATHOLOGIC DIAGNOSIS   Endometrium, curettage:    Multiple fragments of benign endocervical and endometrial polypoid mucosa. She presents today for follow up. She is doing well and is without complaints. Her pelvic ultrasound in May 2019 demonstrated that the ovarian lesion was stable. ROS:   and GI review:  Negative  Cardiopulmonary review:  Negative   Musculoskeletal:  Negative    A comprehensive review of systems was negative except for that written in the History of Present Illness. , 10 point ROS      OB/GYN ROS:  There is no history of significant gyn problems or procedures. Patient denies any abnormal bleeding or vaginal discharge.        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    Hyperlipidemia 01/10/2013    Colon cancer screening 07/10/2012    History of screening mammography 07/10/2012    Pap smear for cervical cancer screening 07/10/2012    Hypothyroid 12/07/2011    Depression 12/07/2011    HTN (hypertension) 12/07/2011    Fibrocystic breast 12/07/2011    S/p tibial fracture 12/07/2011    S/P hemorrhoidectomy 12/07/2011    Environmental allergies 12/07/2011    S/P laparoscopic cholecystectomy 12/07/2011    Hiatal hernia 12/07/2011     PMH:  Past Medical History:   Diagnosis Date    Arthritis     lower back    Cancer ([address]     BCCAs removed - 6 on legs    Depression     Environmental allergies 12/7/2011    Fibrocystic breast 12/7/2011    GERD (gastroesophageal reflux disease)     Hiatal hernia 12/7/2011    HIGH BLOOD PRESSURE     not on meds    HTN (hypertension) 12/7/2011    Hypothyroid 12/7/2011    Nausea & vomiting     S/P hemorrhoidectomy 12/7/2011    S/P laparoscopic cholecystectomy 12/7/2011    S/p tibial fracture 12/7/2011    Tendonitis of wrist, left 12/2017    cortisone shot    Thyroid disorder       PSH:  Past Surgical History:   Procedure Laterality Date    ENDOSCOPY, COLON, DIAGNOSTIC  11/2010    ([facility])-f/u 5 yrs.  HX BREAST LUMPECTOMY      right - benign    HX CHOLECYSTECTOMY      HX GI      hemorrhoidectomy    HX GYN  03/2019    d/c for thickened endometrium    HX ORTHOPAEDIC      surgery for tibial plateau fx - took bone from hip/has hardware knee to ankle      Social History:  Social History     Tobacco Use    Smoking status: Never Smoker    Smokeless tobacco: Never Used   Substance Use Topics    Alcohol use: Yes     Alcohol/week: 7.0 standard drinks     Types: 7 Glasses of wine per week      Family History:  Family History   Problem Relation Age of Onset    Alcohol abuse Mother         cirrhosis    Alcohol abuse Father     Other Father         cerebral hemorrhage    Cancer Brother         prostate    Heart Disease Brother 46        MI(age 46), bypass(66)    Diabetes Maternal Aunt     Hypertension Maternal Aunt     Cancer Maternal Uncle         COLON    Heart Disease Maternal Grandmother         MI    Cancer Cousin         BREAST    Anesth Problems Neg Hx       Medications: (reviewed)  Current Outpatient Medications   Medication Sig    FLUoxetine (PROzac) 20 mg capsule TAKE 1 CAPSULE DAILY    simvastatin (ZOCOR) 40 mg tablet TAKE 1 TABLET NIGHTLY    Synthroid 100 mcg tablet Take 1 Tab by mouth Daily (before breakfast).  calcium carbonate (TUMS) 200 mg calcium (500 mg) chew Take 1 Tab by mouth daily.  As needed    aspirin delayed-release 81 mg tablet Take  by mouth daily.  coenzyme q10 300 mg cap Take 300 mg by mouth daily.  LACTOBACILLUS ACIDOPHILUS (PROBIOTIC PO) Take 1 Cap by mouth daily.  BOSWELLIA SURENDRA EXTRACT (BOSWELLIA SURENDRA XT, BULK,) Take  by mouth daily. Anti-inflammatory supplement. Takes one cap po daily.  MULTIVITAMIN PO Take  by mouth daily. Takes one po daily. No current facility-administered medications for this visit. Allergies: (reviewed)  Allergies   Allergen Reactions    Adhesive Hives    Cephalexin Diarrhea    E-Mycin [Erythromycin] Other (comments)     GI upset    Sudafed [Pseudoephedrine Hcl] Palpitations          OBJECTIVE:    Physical Exam:  VITAL SIGNS: Vitals:    09/10/20 1431   BP: 158/76   Pulse: 64   Weight: 153 lb (69.4 kg)   Height: 5' 4.02\" (1.626 m)     Body mass index is 26.25 kg/m². GENERAL DANIEL: Conversant, alert, oriented. No acute distress. HEENT: HEENT. No thyroid enlargement. No JVD. Neck: Supple without restrictions. RESPIRATORY: Clear to auscultation and percussion to the bases. No CVAT. CARDIOVASC: RRR without murmur/rub. GASTROINT: soft, non-tender, without masses or organomegaly   MUSCULOSKEL: no joint tenderness, deformity or swelling   EXTREMITIES: extremities normal, atraumatic, no cyanosis or edema   PELVIC: Vulva and vagina appear normal. Normal cervix, but there was a small polyp protruding from the endocervical canal.  Bimanual exam reveals normal uterus and adnexa. RECTAL: Deferred   MARTIN SURVEY: No suspicious lymphadenopathy or edema noted. NEURO: Grossly intact. No acute deficit.        Lab Data:    Lab Results   Component Value Date/Time    WBC 4.4 02/04/2020 12:28 PM    HGB 13.8 02/04/2020 12:28 PM    HCT 40.6 02/04/2020 12:28 PM    PLATELET 473 82/64/6332 12:28 PM    MCV 96 02/04/2020 12:28 PM     Lab Results   Component Value Date/Time    Sodium 139 08/07/2020 12:00 AM    Potassium 5.0 08/07/2020 12:00 AM    Chloride 98 08/07/2020 12:00 AM    CO2 25 08/07/2020 12:00 AM    Anion gap 9 03/05/2019 12:14 PM    Glucose 93 08/07/2020 12:00 AM    BUN 13 08/07/2020 12:00 AM    Creatinine 0.73 08/07/2020 12:00 AM    BUN/Creatinine ratio 18 08/07/2020 12:00 AM    GFR est AA 96 08/07/2020 12:00 AM    GFR est non-AA 83 08/07/2020 12:00 AM    Calcium 10.0 08/07/2020 12:00 AM         CT of abdomen/pelvis (12/4/18) [facility] chest:  Within normal limits. Vasculature:  Scattered atherosclerosis of the aortoiliac system without  aneurysm. Liver, gallbladder and biliary system:  Status post cholecystectomy.  The  liver has a normal appearance. Spleen:  Within normal limits. Pancreas:  Within normal limits. Adrenal glands: Within normal limits. Kidneys, ureters and urinary bladder:  No nephrolithiasis, hydronephrosis  or perinephric stranding.  In the interpolar region of the right  kidney is an oval-shaped 2.0 cm cyst.  No hydroureter.  Evaluation for  small punctate ureteral stones is limited by excreted contrast.  Urinary  bladder is within normal limits. GI tract:  Tiny hiatal hernia is present.  The stomach is otherwise normal.  There are several fluid-filled but nondilated loops of small bowel  throughout the abdomen and pelvis.  The appendix is tentatively identified. No right lower quadrant inflammatory changes.  The cecum is somewhat  patulous and fluid-filled.  Mild amount of fluid within the proximal  ascending colon.  Moderate to large distal colonic and rectal stool burden. No colonic diverticuli. Reproductive organs: Within the right adnexa is a rounded 4.9 cm  hypodensity measuring fluid attenuation. Peritoneum, retroperitoneum and mesentery:  No bulky lympadenopathy.  No  suspicious free fluid.  No free air identified. Superficial soft tissues: Within normal limits.     Skeletal structures:  There is asymmetric contour deformity of the superior  aspect of the right iliac crest and a small 9 mm lucent defect with overall  benign appearance.  Degenerative changes of the spine are present.  No  aggressive osseous lytic or blastic lesions. Result Impression   1. Several fluid-filled but nondilated loops of small bowel with moderate  amount of fluid within the cecum and proximal ascending colon.  Findings  likely represent enterocolitis. 2. No evidence of bowel obstruction, acute inflammatory process or  urolithiasis. 3. Tiny hiatal hernia. 4. Nearly 5 cm fluid density lesion within the right adnexa, likely  representing a cyst.  Routine outpatient pelvic ultrasound is recommended. 5. Moderate to large rectal stool burden which can be seen in the setting  of impaction. Pelvic ultrasound (1/16/19)  FINDINGS TRANSABDOMINAL:  The UTERUS MEASURES 6.6 x 2.6 x 4.1 cm. The central  endometrium measures 5mm in thickness. A small amount of fluid and heterogeneity  are noted in the endometrial canal..     There is no free fluid in the cul-de-sac.     The RIGHT OVARY measures 2.0 x 5.6 x 3.8cm . The LEFT OVARY measures 1.6 x 1.2 x  1.3 cm . The right ovary shows a dominant well-circumscribed cyst measuring 5.0  x 4.0 x 3.8 cm. Both ovaries show normal blood flow by color Doppler. The left  ovary is normal. There is no free fluid in the cul-de-sac.        FINDINGS TRANSVAGINAL:  The uterus shows overall size of 6.1 x 2.7 x 3.9.. Small  heterogeneous fluid collections are noted in the endometrial canal which is  approximately 8 mm in thickness. The internal cervical os is closed.     Further evaluation of the ovaries transvaginally reveals the right ovary  measures 1.6 x 0.9 x 1.7 cm, exclusive of a large simple cyst, and the left  ovary measures 1.9 x 1.5 x 1.4 cm. . The right ovarian cyst measures 4.8 x 3.6 x  3.6 cm. Both ovaries show normal blood flow by color Doppler.     IMPRESSION:   1. Transabdominal pelvic ultrasound revealing large right ovarian cyst. Normal  left ovary.  Follow-up to clearing of the right ovarian cyst is recommended. Further evaluation of the current endometrial appearance is suggested. 2. Transvaginal pelvic ultrasound revealing right ovarian cyst 4.8 cm, for which  follow-up to clearing is recommended. Normal left ovary. Heterogeneous  endometrial canal, for which further evaluation is recommended. This appearance  may represent polyps, cysts or other indeterminate abnormality. Neoplasm is not  excluded. Pelvic ultrasound (5/21/19)  Pelvic ultrasound: Realtime sonographic imaging of the pelvis was performed  transabdominally. The uterus measures 6.6 x 3.7 x 2.5 cm and is normal in  appearance. The endometrial stripe measures 8 mm. The right ovary measures 6.6 x  5.8 x 4.2 cm and the left ovary measures 2.0 x 2.1 x 1.2 cm. There is a 5.3 x  3.4 x 4.8 cm cystic structure in the right ovary. The left ovary is normal in  appearance. No free fluid.      IMPRESSION  5.3 cm cystic structure right ovary otherwise unremarkable. Correlation with transvaginal ultrasound will be performed.     Transvaginal ultrasound: Realtime sonographic imaging of the pelvis was  performed transvaginally. There is a 1.0 x 1.2 x 0.9 cm fibroid in the right  uterine body. The endometrial stripe measures 2 mm. The right ovary measures 5.4  x 5.8 x 4.0 cm and the left ovary measures 1.6 x 1.5 x 0.9 cm. There is a 5.5 x  5.4 x 3.7 cm cyst in the right ovary. The left ovary is normal in appearance. Blood flow is documented within both ovaries. No free fluid.      IMPRESSION: Likely little change in the right ovarian cyst compared to the prior  examination. Otherwise unremarkable       IMPRESSION/PLAN:  Neil Gonzalez is a 68 y.o. female with a working diagnosis of right ovarian cystic mass, as well as a thickened endometrium. She is s/p hysteroscopy/D&C with benign findings. Her ovarian cyst has remained stable. We will repeat an ultrasound at this time. If it is again stable, we will likely stop with regular imaging. Signed By: Vanessa Bradley MD     9/10/2020/3:13 PM

## 2020-09-17 ENCOUNTER — HOSPITAL ENCOUNTER (OUTPATIENT)
Dept: ULTRASOUND IMAGING | Age: 70
Discharge: HOME | End: 2020-09-17
Attending: OBSTETRICS & GYNECOLOGY
Payer: MEDICARE

## 2020-09-17 PROCEDURE — 76856 US EXAM PELVIC COMPLETE: CPT

## 2020-09-17 PROCEDURE — 76830 TRANSVAGINAL US NON-OB: CPT

## 2020-09-22 ENCOUNTER — VIRTUAL VISIT (OUTPATIENT)
Dept: GYNECOLOGY | Age: 70
End: 2020-09-22
Payer: MEDICARE

## 2020-09-22 DIAGNOSIS — N83.8 MASS OF RIGHT OVARY: Primary | ICD-10-CM

## 2020-09-22 PROCEDURE — 99442 PR PHYS/QHP TELEPHONE EVALUATION 11-20 MIN: CPT | Performed by: OBSTETRICS & GYNECOLOGY

## 2020-09-22 NOTE — PROGRESS NOTES
[address] [place] [address] Mathias Moritz [address], [address]  P [phone]  F [phone]    Office Note  Patient ID:  Name:  Meagan Hester  MRN:  [id number]  :  1950/69 y.o. Date:  2020      HISTORY OF PRESENT ILLNESS:  Meagan Hester is a 68 y.o.  postmenopausal female who was referred to me for a right ovarian mass by Dr. Prasanth Bee. She was seen in the ER at [facility] in 2018 with abdominal pain, nausea/vomiting, and diarrhea. A CT scan suggested possible enterocolitis, but no obstruction. She was noted to have an incidental findings of a 4.9 cm right ovarian cyst.  She subsequently followed up with Dr. Miranda Damian.  She was sent for a pelvic ultrasound to better evaluated the ovarian cyst.  On the ultrasound it measured 4.8 cm. It was simple and well-circumscribed. Doppler flow was normal.  The contralateral ovary appeared normal.  There was no free fluid in the cul de sac. The endometrium, however, was a bit thickened, measuring 8 mm. She denies any vaginal bleeding or discharge. Her abdominal symptoms have now resolved. She denies a family history of gynecologic malignancy. I reassured her that the cystic ovarian mass on the right was most likely benign, as it appeared simple, was not that large, and had normal doppler flow. I discussed options with her, including observation and surgical removal.  I felt that both were reasonable. She would like to avoid a big surgery if possible. I suggested that we just repeat an ultrasound if a couple of months to make sure that it remains stable in size and appearance. If things change, we can then consider surgical excision. As for the thickened endometrium on ultrasound, I explained that in a postmenopausal woman this requires evaluation. I suggested a hysteroscopy/D&C to evaluate. This was performed on 3/8/19.      Operative findings:  Large endometrial polyp arising from superior and lateral aspect of the right uterine wall, near the right tubal os. Smaller sessile polyp near the left tubal os. Multiple small cervical polyps. Nothing suspicious for malignancy. FINAL PATHOLOGIC DIAGNOSIS   Endometrium, curettage:    Multiple fragments of benign endocervical and endometrial polypoid mucosa. She presented recently for follow up. She is doing well and is without complaints. Her pelvic ultrasound in May 2019 demonstrated that the ovarian lesion was stable. Her exam at her last visit was normal.  I recommended repeating an ultrasound. She presents today to discuss those results. It has remained unchanged. ROS:   and GI review:  Negative  Cardiopulmonary review:  Negative   Musculoskeletal:  Negative    A comprehensive review of systems was negative except for that written in the History of Present Illness. , 10 point ROS      OB/GYN ROS:  There is no history of significant gyn problems or procedures. Patient denies any abnormal bleeding or vaginal discharge.        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    Hyperlipidemia 01/10/2013    Colon cancer screening 07/10/2012    History of screening mammography 07/10/2012    Pap smear for cervical cancer screening 07/10/2012    Hypothyroid 12/07/2011    Depression 12/07/2011    HTN (hypertension) 12/07/2011    Fibrocystic breast 12/07/2011    S/p tibial fracture 12/07/2011    S/P hemorrhoidectomy 12/07/2011    Environmental allergies 12/07/2011    S/P laparoscopic cholecystectomy 12/07/2011    Hiatal hernia 12/07/2011     PMH:  Past Medical History: Diagnosis Date    Arthritis     lower back    Cancer ([facility] [address]     BCCAs removed - 6 on legs    Depression     Environmental allergies 12/7/2011    Fibrocystic breast 12/7/2011    GERD (gastroesophageal reflux disease)     Hiatal hernia 12/7/2011    HIGH BLOOD PRESSURE     not on meds    HTN (hypertension) 12/7/2011    Hypothyroid 12/7/2011    Nausea & vomiting     S/P hemorrhoidectomy 12/7/2011    S/P laparoscopic cholecystectomy 12/7/2011    S/p tibial fracture 12/7/2011    Tendonitis of wrist, left 12/2017    cortisone shot    Thyroid disorder       PSH:  Past Surgical History:   Procedure Laterality Date    ENDOSCOPY, COLON, DIAGNOSTIC  11/2010    ([facility])-f/u 5 yrs.  HX BREAST LUMPECTOMY      right - benign    HX CHOLECYSTECTOMY      HX GI      hemorrhoidectomy    HX GYN  03/2019    d/c for thickened endometrium    HX ORTHOPAEDIC      surgery for tibial plateau fx - took bone from hip/has hardware knee to ankle      Social History:  Social History     Tobacco Use    Smoking status: Never Smoker    Smokeless tobacco: Never Used   Substance Use Topics    Alcohol use:  Yes     Alcohol/week: 7.0 standard drinks     Types: 7 Glasses of wine per week      Family History:  Family History   Problem Relation Age of Onset    Alcohol abuse Mother         cirrhosis    Alcohol abuse Father     Other Father         cerebral hemorrhage    Cancer Brother         prostate    Heart Disease Brother 46        MI(age 46), bypass(66)    Diabetes Maternal Aunt     Hypertension Maternal Aunt     Cancer Maternal Uncle         COLON    Heart Disease Maternal Grandmother         MI    Cancer Cousin         BREAST    Anesth Problems Neg Hx       Medications: (reviewed)  Current Outpatient Medications   Medication Sig    FLUoxetine (PROzac) 20 mg capsule TAKE 1 CAPSULE DAILY    simvastatin (ZOCOR) 40 mg tablet TAKE 1 TABLET NIGHTLY    Synthroid 100 mcg tablet Take 1 Tab by mouth Daily (before breakfast).  calcium carbonate (TUMS) 200 mg calcium (500 mg) chew Take 1 Tab by mouth daily. As needed    aspirin delayed-release 81 mg tablet Take  by mouth daily.  coenzyme q10 300 mg cap Take 300 mg by mouth daily.  LACTOBACILLUS ACIDOPHILUS (PROBIOTIC PO) Take 1 Cap by mouth daily.  BOSWELLIA SURENDRA EXTRACT (BOSWELLIA SURENDRA XT, BULK,) Take  by mouth daily. Anti-inflammatory supplement. Takes one cap po daily.  MULTIVITAMIN PO Take  by mouth daily. Takes one po daily. No current facility-administered medications for this visit. Allergies: (reviewed)  Allergies   Allergen Reactions    Adhesive Hives    Cephalexin Diarrhea    E-Mycin [Erythromycin] Other (comments)     GI upset    Sudafed [Pseudoephedrine Hcl] Palpitations          OBJECTIVE:    Physical Exam:  VITAL SIGNS: There were no vitals filed for this visit. There is no height or weight on file to calculate BMI. GENERAL DANIEL:    HEENT:    RESPIRATORY:    CARDIOVASC:    GASTROINT:    MUSCULOSKEL:    EXTREMITIES:    PELVIC:    RECTAL:    MARTIN SURVEY:    NEURO:        Lab Data:    Lab Results   Component Value Date/Time    WBC 4.4 02/04/2020 12:28 PM    HGB 13.8 02/04/2020 12:28 PM    HCT 40.6 02/04/2020 12:28 PM    PLATELET 566 22/73/3349 12:28 PM    MCV 96 02/04/2020 12:28 PM     Lab Results   Component Value Date/Time    Sodium 139 08/07/2020 12:00 AM    Potassium 5.0 08/07/2020 12:00 AM    Chloride 98 08/07/2020 12:00 AM    CO2 25 08/07/2020 12:00 AM    Anion gap 9 03/05/2019 12:14 PM    Glucose 93 08/07/2020 12:00 AM    BUN 13 08/07/2020 12:00 AM    Creatinine 0.73 08/07/2020 12:00 AM    BUN/Creatinine ratio 18 08/07/2020 12:00 AM    GFR est AA 96 08/07/2020 12:00 AM    GFR est non-AA 83 08/07/2020 12:00 AM    Calcium 10.0 08/07/2020 12:00 AM         CT of abdomen/pelvis (12/4/18) [facility]  Lower chest:  Within normal limits.     Vasculature:  Scattered atherosclerosis of the aortoiliac system without  aneurysm. Liver, gallbladder and biliary system:  Status post cholecystectomy.  The  liver has a normal appearance. Spleen:  Within normal limits. Pancreas:  Within normal limits. Adrenal glands: Within normal limits. Kidneys, ureters and urinary bladder:  No nephrolithiasis, hydronephrosis  or perinephric stranding.  In the interpolar region of the right  kidney is an oval-shaped 2.0 cm cyst.  No hydroureter.  Evaluation for  small punctate ureteral stones is limited by excreted contrast.  Urinary  bladder is within normal limits. GI tract:  Tiny hiatal hernia is present.  The stomach is otherwise normal.  There are several fluid-filled but nondilated loops of small bowel  throughout the abdomen and pelvis.  The appendix is tentatively identified. No right lower quadrant inflammatory changes.  The cecum is somewhat  patulous and fluid-filled.  Mild amount of fluid within the proximal  ascending colon.  Moderate to large distal colonic and rectal stool burden. No colonic diverticuli. Reproductive organs: Within the right adnexa is a rounded 4.9 cm  hypodensity measuring fluid attenuation. Peritoneum, retroperitoneum and mesentery:  No bulky lympadenopathy.  No  suspicious free fluid.  No free air identified. Superficial soft tissues: Within normal limits. Skeletal structures:  There is asymmetric contour deformity of the superior  aspect of the right iliac crest and a small 9 mm lucent defect with overall  benign appearance.  Degenerative changes of the spine are present.  No  aggressive osseous lytic or blastic lesions. Result Impression   1. Several fluid-filled but nondilated loops of small bowel with moderate  amount of fluid within the cecum and proximal ascending colon.  Findings  likely represent enterocolitis. 2. No evidence of bowel obstruction, acute inflammatory process or  urolithiasis. 3. Tiny hiatal hernia.   4. Nearly 5 cm fluid density lesion within the right adnexa, likely  representing a cyst.  Routine outpatient pelvic ultrasound is recommended. 5. Moderate to large rectal stool burden which can be seen in the setting  of impaction. Pelvic ultrasound (1/16/19)  FINDINGS TRANSABDOMINAL:  The UTERUS MEASURES 6.6 x 2.6 x 4.1 cm. The central  endometrium measures 5mm in thickness. A small amount of fluid and heterogeneity  are noted in the endometrial canal..     There is no free fluid in the cul-de-sac.     The RIGHT OVARY measures 2.0 x 5.6 x 3.8cm . The LEFT OVARY measures 1.6 x 1.2 x  1.3 cm . The right ovary shows a dominant well-circumscribed cyst measuring 5.0  x 4.0 x 3.8 cm. Both ovaries show normal blood flow by color Doppler. The left  ovary is normal. There is no free fluid in the cul-de-sac.        FINDINGS TRANSVAGINAL:  The uterus shows overall size of 6.1 x 2.7 x 3.9.. Small  heterogeneous fluid collections are noted in the endometrial canal which is  approximately 8 mm in thickness. The internal cervical os is closed.     Further evaluation of the ovaries transvaginally reveals the right ovary  measures 1.6 x 0.9 x 1.7 cm, exclusive of a large simple cyst, and the left  ovary measures 1.9 x 1.5 x 1.4 cm. . The right ovarian cyst measures 4.8 x 3.6 x  3.6 cm. Both ovaries show normal blood flow by color Doppler.     IMPRESSION:   1. Transabdominal pelvic ultrasound revealing large right ovarian cyst. Normal  left ovary. Follow-up to clearing of the right ovarian cyst is recommended. Further evaluation of the current endometrial appearance is suggested. 2. Transvaginal pelvic ultrasound revealing right ovarian cyst 4.8 cm, for which  follow-up to clearing is recommended. Normal left ovary. Heterogeneous  endometrial canal, for which further evaluation is recommended. This appearance  may represent polyps, cysts or other indeterminate abnormality. Neoplasm is not  excluded.        Pelvic ultrasound (5/21/19)  Pelvic ultrasound: Realtime sonographic imaging of the pelvis was performed  transabdominally. The uterus measures 6.6 x 3.7 x 2.5 cm and is normal in  appearance. The endometrial stripe measures 8 mm. The right ovary measures 6.6 x  5.8 x 4.2 cm and the left ovary measures 2.0 x 2.1 x 1.2 cm. There is a 5.3 x  3.4 x 4.8 cm cystic structure in the right ovary. The left ovary is normal in  appearance. No free fluid.      IMPRESSION  5.3 cm cystic structure right ovary otherwise unremarkable. Correlation with transvaginal ultrasound will be performed.     Transvaginal ultrasound: Realtime sonographic imaging of the pelvis was  performed transvaginally. There is a 1.0 x 1.2 x 0.9 cm fibroid in the right  uterine body. The endometrial stripe measures 2 mm. The right ovary measures 5.4  x 5.8 x 4.0 cm and the left ovary measures 1.6 x 1.5 x 0.9 cm. There is a 5.5 x  5.4 x 3.7 cm cyst in the right ovary. The left ovary is normal in appearance. Blood flow is documented within both ovaries. No free fluid.      IMPRESSION: Likely little change in the right ovarian cyst compared to the prior  examination. Otherwise unremarkable      Pelvic ultrasound (9/17/20)  Transabdominal ultrasound:  Urinary bladder: within normal limits.     Uterus: measures 6.7 x 3.2 x 3.2 cm, which is within normal limits. There is no  sonographic myometrial abnormality.     Endometrium: 5 mm     Right ovary: 5.1 x 5.0 x 4.4 cm. Blood flow is present in the right ovary. There  is a 4.6 x 3.8 x 4.4 cm anechoic cyst in the right ovary without evidence of  vascularity.      Left ovary: Not visualized cm. Free fluid: None.     Endovaginal ultrasound:   Urinary bladder: Nondistended.     Uterus: measures 4.9 x 3.0 x 2.2 cm, which is within normal limits. There is no  sonographic myometrial abnormality.     Endometrium: 2 mm in thickness. Homogeneous.     Right ovary: 5.5 x 5.4 x 3.7 cm. Blood flow is present in the right ovary.  5.0 x  4.4 x 3.5 cm anechoic cyst in the right ovary. No evidence of internal  vascularity. Small mural cystic nodule is noted within the cyst.     Left ovary: Not visualized .     Free fluid: Physiologic.     IMPRESSION:   Cystic lesion right ovary without significant change in size. The lesion is  predominantly anechoic with increased through transmission and no internal  vascularity. Along the margin of the cyst, there is a cystic mural nodule which  was not definitely visualized on the previous study. IMPRESSION/PLAN:  Melanie Toth is a 68 y.o. female with a diagnosis of right ovarian cystic mass. Her ovarian cyst has remained stable for quite some time. I am very comfortable that this a benign process and I don't think we need to monitor it anymore. If new symptoms arise we will take another look. She can follow up as needed.           Signed By: Kamran Butcher MD     9/22/2020/3:13 PM

## 2021-01-27 ENCOUNTER — IMMUNIZATION (OUTPATIENT)
Dept: FAMILY MEDICINE CLINIC | Age: 71
End: 2021-01-27
Payer: MEDICARE

## 2021-01-27 DIAGNOSIS — Z23 ENCOUNTER FOR IMMUNIZATION: Primary | ICD-10-CM

## 2021-01-27 PROCEDURE — 91301 COVID-19, MRNA, LNP-S, PF, 100MCG/0.5ML DOSE(MODERNA): CPT | Performed by: FAMILY MEDICINE

## 2021-02-25 ENCOUNTER — IMMUNIZATION (OUTPATIENT)
Dept: FAMILY MEDICINE CLINIC | Age: 71
End: 2021-02-25
Payer: MEDICARE

## 2021-02-25 DIAGNOSIS — Z23 ENCOUNTER FOR IMMUNIZATION: Primary | ICD-10-CM

## 2021-02-25 PROCEDURE — 91301 COVID-19, MRNA, LNP-S, PF, 100MCG/0.5ML DOSE(MODERNA): CPT | Performed by: FAMILY MEDICINE

## 2021-02-25 PROCEDURE — 0012A COVID-19, MRNA, LNP-S, PF, 100MCG/0.5ML DOSE(MODERNA): CPT | Performed by: FAMILY MEDICINE

## 2021-03-23 ENCOUNTER — OFFICE VISIT (OUTPATIENT)
Dept: INTERNAL MEDICINE CLINIC | Age: 71
End: 2021-03-23
Payer: MEDICARE

## 2021-03-23 VITALS
SYSTOLIC BLOOD PRESSURE: 140 MMHG | TEMPERATURE: 97.5 F | RESPIRATION RATE: 16 BRPM | BODY MASS INDEX: 26.4 KG/M2 | WEIGHT: 154.6 LBS | HEIGHT: 64 IN | HEART RATE: 66 BPM | DIASTOLIC BLOOD PRESSURE: 76 MMHG | OXYGEN SATURATION: 99 %

## 2021-03-23 DIAGNOSIS — E03.9 HYPOTHYROIDISM, ADULT: ICD-10-CM

## 2021-03-23 DIAGNOSIS — I10 BENIGN HYPERTENSION: Primary | ICD-10-CM

## 2021-03-23 DIAGNOSIS — F32.A DEPRESSION, UNSPECIFIED DEPRESSION TYPE: ICD-10-CM

## 2021-03-23 DIAGNOSIS — R10.11 RIGHT UPPER QUADRANT PAIN: ICD-10-CM

## 2021-03-23 DIAGNOSIS — Z79.899 ENCOUNTER FOR LONG-TERM (CURRENT) USE OF MEDICATIONS: ICD-10-CM

## 2021-03-23 DIAGNOSIS — E78.2 MIXED HYPERLIPIDEMIA: ICD-10-CM

## 2021-03-23 PROCEDURE — G8754 DIAS BP LESS 90: HCPCS | Performed by: INTERNAL MEDICINE

## 2021-03-23 PROCEDURE — G0463 HOSPITAL OUTPT CLINIC VISIT: HCPCS | Performed by: INTERNAL MEDICINE

## 2021-03-23 PROCEDURE — G8753 SYS BP > OR = 140: HCPCS | Performed by: INTERNAL MEDICINE

## 2021-03-23 PROCEDURE — 1090F PRES/ABSN URINE INCON ASSESS: CPT | Performed by: INTERNAL MEDICINE

## 2021-03-23 PROCEDURE — 1101F PT FALLS ASSESS-DOCD LE1/YR: CPT | Performed by: INTERNAL MEDICINE

## 2021-03-23 PROCEDURE — G8419 CALC BMI OUT NRM PARAM NOF/U: HCPCS | Performed by: INTERNAL MEDICINE

## 2021-03-23 PROCEDURE — 3017F COLORECTAL CA SCREEN DOC REV: CPT | Performed by: INTERNAL MEDICINE

## 2021-03-23 PROCEDURE — 99214 OFFICE O/P EST MOD 30 MIN: CPT | Performed by: INTERNAL MEDICINE

## 2021-03-23 PROCEDURE — G9717 DOC PT DX DEP/BP F/U NT REQ: HCPCS | Performed by: INTERNAL MEDICINE

## 2021-03-23 PROCEDURE — G8427 DOCREV CUR MEDS BY ELIG CLIN: HCPCS | Performed by: INTERNAL MEDICINE

## 2021-03-23 PROCEDURE — G8536 NO DOC ELDER MAL SCRN: HCPCS | Performed by: INTERNAL MEDICINE

## 2021-03-23 PROCEDURE — G8399 PT W/DXA RESULTS DOCUMENT: HCPCS | Performed by: INTERNAL MEDICINE

## 2021-03-23 NOTE — PROGRESS NOTES
Subjective: Hiral Arnold is a 65 y.o. female who presents today for follow up of her hyperlipidemia, hypertension, depression and hypothyroid. Has some right upper quad discomfort. Has been on vacation in [place] and eating and drinking more than usual. 'feels like my gallbladder is back'    Consultants:  -Dr. Bassam Muniz - dermatology  -Dr. Monica Bashir - benign ovarian cyst  -Dr. Jay Power  -Dr. Marivn Palmer - eye care    Due for:  -colonoscopy - done with Dr. Pernell Eckert - 2020. Current Outpatient Medications   Medication Sig Dispense Refill    FLUoxetine (PROzac) 20 mg capsule TAKE 1 CAPSULE DAILY 90 Cap 1    simvastatin (ZOCOR) 40 mg tablet TAKE 1 TABLET NIGHTLY 90 Tab 1    Synthroid 100 mcg tablet TAKE 1 TABLET DAILY BEFORE BREAKFAST 90 Tab 1    calcium carbonate (TUMS) 200 mg calcium (500 mg) chew Take 1 Tab by mouth daily. As needed      aspirin delayed-release 81 mg tablet Take  by mouth daily.  coenzyme q10 300 mg cap Take 300 mg by mouth daily.  LACTOBACILLUS ACIDOPHILUS (PROBIOTIC PO) Take 1 Cap by mouth daily.  BOSWELLIA SURENDRA EXTRACT (BOSWELLIA SURENDRA XT, BULK,) Take  by mouth daily. Anti-inflammatory supplement. Takes one cap po daily.  MULTIVITAMIN PO Take  by mouth daily. Takes one po daily. Review of Systems    Pertinent items are noted in HPI. Objective:      Wt Readings from Last 3 Encounters:   09/10/20 153 lb (69.4 kg)   08/07/20 153 lb (69.4 kg)   02/04/20 151 lb (68.5 kg)     BP Readings from Last 3 Encounters:   09/10/20 158/76   08/07/20 110/60   02/04/20 122/72     Visit Vitals  BP (!) 140/76   Pulse 66   Temp 97.5 °F (36.4 °C) (Temporal)   Resp 16   Ht 5' 4.02\" (1.626 m)   Wt 154 lb 9.6 oz (70.1 kg)   SpO2 99%   BMI 26.52 kg/m²     General appearance: alert, cooperative, no distress, appears stated age  Head: Normocephalic, without obvious abnormality, atraumatic  Neck: supple, symmetrical, trachea midline, no adenopathy, no carotid bruit and no JVD  Lungs: clear to auscultation bilaterally  Heart: regular rate and rhythm, S1, S2 normal, no murmur, click, rub or gallop  Abdomen: soft, non-tender. Bowel sounds normal. No masses,  no organomegaly  Extremities: extremities normal, atraumatic, no cyanosis or edema  Pulses: 2+ and symmetric    Assessment/Plan:     1. Benign hypertension  -I evaluated and recommended to continue current doses of medications.     - METABOLIC PANEL, COMPREHENSIVE; Future  - CBC WITH AUTOMATED DIFF; Future    2. Mixed hyperlipidemia  -fasting lipid panel today. Compliant with use of her simvastatin. - METABOLIC PANEL, COMPREHENSIVE; Future  - LIPID PANEL; Future    3. Hypothyroidism, adult  -clinically euthryoid    - TSH 3RD GENERATION; Future  - T4, FREE; Future    4. Depression, unspecified depression type  -I evaluated and recommended to continue current doses of medications. 5. Encounter for long-term (current) use of medications      6. Right upper quadrant pain  -check labs. May be due to eating more fatty foods and increased consumption of alcohol during vacation. -encouraged hydration and maintaining low fat diet. Orders Placed This Encounter    METABOLIC PANEL, COMPREHENSIVE     Standing Status:   Future     Standing Expiration Date:   3/23/2022    TSH 3RD GENERATION     Standing Status:   Future     Standing Expiration Date:   3/23/2022    T4, FREE     Standing Status:   Future     Standing Expiration Date:   3/23/2022    CBC WITH AUTOMATED DIFF     Standing Status:   Future     Standing Expiration Date:   3/23/2022    LIPID PANEL     Standing Status:   Future     Standing Expiration Date:   3/23/2022         Follow-up Disposition:     Follow up in 6 months     Return if symptoms worsen or fail to improve. Advised patient to call back or return to office if symptoms worsen/change/persist.     Discussed expected course/resolution/complications of diagnosis in detail with patient.    Medication risks/benefits/costs/interactions/alternatives discussed with patient. Patient was given an after visit summary which includes diagnoses, current medications, & vitals. Patient expressed understanding with the diagnosis and plan.

## 2021-03-24 LAB
ALBUMIN SERPL-MCNC: 4.1 G/DL (ref 3.5–5)
ALBUMIN/GLOB SERPL: 1.4 {RATIO} (ref 1.1–2.2)
ALP SERPL-CCNC: 107 U/L (ref 45–117)
ALT SERPL-CCNC: 38 U/L (ref 12–78)
ANION GAP SERPL CALC-SCNC: 4 MMOL/L (ref 5–15)
AST SERPL-CCNC: 21 U/L (ref 15–37)
BASOPHILS # BLD: 0.1 K/UL (ref 0–0.1)
BASOPHILS NFR BLD: 1 % (ref 0–1)
BILIRUB SERPL-MCNC: 0.5 MG/DL (ref 0.2–1)
BUN SERPL-MCNC: 20 MG/DL (ref 6–20)
BUN/CREAT SERPL: 31 (ref 12–20)
CALCIUM SERPL-MCNC: 9.4 MG/DL (ref 8.5–10.1)
CHLORIDE SERPL-SCNC: 104 MMOL/L (ref 97–108)
CHOLEST SERPL-MCNC: 236 MG/DL
CO2 SERPL-SCNC: 31 MMOL/L (ref 21–32)
CREAT SERPL-MCNC: 0.65 MG/DL (ref 0.55–1.02)
DIFFERENTIAL METHOD BLD: NORMAL
EOSINOPHIL # BLD: 0.1 K/UL (ref 0–0.4)
EOSINOPHIL NFR BLD: 3 % (ref 0–7)
ERYTHROCYTE [DISTWIDTH] IN BLOOD BY AUTOMATED COUNT: 12.4 % (ref 11.5–14.5)
GLOBULIN SER CALC-MCNC: 3 G/DL (ref 2–4)
GLUCOSE SERPL-MCNC: 90 MG/DL (ref 65–100)
HCT VFR BLD AUTO: 42.2 % (ref 35–47)
HDLC SERPL-MCNC: 85 MG/DL
HDLC SERPL: 2.8 {RATIO} (ref 0–5)
HGB BLD-MCNC: 13.9 G/DL (ref 11.5–16)
IMM GRANULOCYTES # BLD AUTO: 0 K/UL (ref 0–0.04)
IMM GRANULOCYTES NFR BLD AUTO: 0 % (ref 0–0.5)
LDLC SERPL CALC-MCNC: 122.8 MG/DL (ref 0–100)
LIPID PROFILE,FLP: ABNORMAL
LYMPHOCYTES # BLD: 1.1 K/UL (ref 0.8–3.5)
LYMPHOCYTES NFR BLD: 27 % (ref 12–49)
MCH RBC QN AUTO: 32.5 PG (ref 26–34)
MCHC RBC AUTO-ENTMCNC: 32.9 G/DL (ref 30–36.5)
MCV RBC AUTO: 98.6 FL (ref 80–99)
MONOCYTES # BLD: 0.5 K/UL (ref 0–1)
MONOCYTES NFR BLD: 12 % (ref 5–13)
NEUTS SEG # BLD: 2.4 K/UL (ref 1.8–8)
NEUTS SEG NFR BLD: 57 % (ref 32–75)
NRBC # BLD: 0 K/UL (ref 0–0.01)
NRBC BLD-RTO: 0 PER 100 WBC
PLATELET # BLD AUTO: 328 K/UL (ref 150–400)
PMV BLD AUTO: 9.9 FL (ref 8.9–12.9)
POTASSIUM SERPL-SCNC: 4.4 MMOL/L (ref 3.5–5.1)
PROT SERPL-MCNC: 7.1 G/DL (ref 6.4–8.2)
RBC # BLD AUTO: 4.28 M/UL (ref 3.8–5.2)
SODIUM SERPL-SCNC: 139 MMOL/L (ref 136–145)
T4 FREE SERPL-MCNC: 1.2 NG/DL (ref 0.8–1.5)
TRIGL SERPL-MCNC: 141 MG/DL (ref ?–150)
TSH SERPL DL<=0.05 MIU/L-ACNC: 0.83 UIU/ML (ref 0.36–3.74)
VLDLC SERPL CALC-MCNC: 28.2 MG/DL
WBC # BLD AUTO: 4.2 K/UL (ref 3.6–11)

## 2021-03-31 NOTE — PROGRESS NOTES
Cholesterol has increase - was recently on vacation. Watch diet.  Pt notified via [employer] 3/30/2021

## 2021-04-08 ENCOUNTER — OFFICE VISIT (OUTPATIENT)
Dept: CARDIOLOGY CLINIC | Age: 71
End: 2021-04-08
Payer: MEDICARE

## 2021-04-08 VITALS
BODY MASS INDEX: 26.12 KG/M2 | RESPIRATION RATE: 18 BRPM | OXYGEN SATURATION: 98 % | HEART RATE: 62 BPM | DIASTOLIC BLOOD PRESSURE: 80 MMHG | HEIGHT: 64 IN | WEIGHT: 153 LBS | SYSTOLIC BLOOD PRESSURE: 122 MMHG

## 2021-04-08 DIAGNOSIS — E78.2 MIXED HYPERLIPIDEMIA: ICD-10-CM

## 2021-04-08 DIAGNOSIS — R93.1 AGATSTON CAC SCORE 100-199: Primary | ICD-10-CM

## 2021-04-08 DIAGNOSIS — I10 ESSENTIAL HYPERTENSION: ICD-10-CM

## 2021-04-08 PROCEDURE — G8427 DOCREV CUR MEDS BY ELIG CLIN: HCPCS | Performed by: SPECIALIST

## 2021-04-08 PROCEDURE — G9899 SCRN MAM PERF RSLTS DOC: HCPCS | Performed by: SPECIALIST

## 2021-04-08 PROCEDURE — G9717 DOC PT DX DEP/BP F/U NT REQ: HCPCS | Performed by: SPECIALIST

## 2021-04-08 PROCEDURE — 99213 OFFICE O/P EST LOW 20 MIN: CPT | Performed by: SPECIALIST

## 2021-04-08 PROCEDURE — G8419 CALC BMI OUT NRM PARAM NOF/U: HCPCS | Performed by: SPECIALIST

## 2021-04-08 PROCEDURE — 3017F COLORECTAL CA SCREEN DOC REV: CPT | Performed by: SPECIALIST

## 2021-04-08 PROCEDURE — G8536 NO DOC ELDER MAL SCRN: HCPCS | Performed by: SPECIALIST

## 2021-04-08 PROCEDURE — 1090F PRES/ABSN URINE INCON ASSESS: CPT | Performed by: SPECIALIST

## 2021-04-08 PROCEDURE — G8752 SYS BP LESS 140: HCPCS | Performed by: SPECIALIST

## 2021-04-08 PROCEDURE — G8754 DIAS BP LESS 90: HCPCS | Performed by: SPECIALIST

## 2021-04-08 PROCEDURE — 1101F PT FALLS ASSESS-DOCD LE1/YR: CPT | Performed by: SPECIALIST

## 2021-04-08 PROCEDURE — G0463 HOSPITAL OUTPT CLINIC VISIT: HCPCS | Performed by: SPECIALIST

## 2021-04-08 PROCEDURE — G8399 PT W/DXA RESULTS DOCUMENT: HCPCS | Performed by: SPECIALIST

## 2021-04-08 NOTE — PROGRESS NOTES
HISTORY OF PRESENT ILLNESS  Minh Glaser is a 65 y.o. female     SUMMARY:   Problem List  Date Reviewed: 4/8/2021          Codes Class Noted    Endometrial polyp ICD-10-CM: N84.0  ICD-9-CM: 621.0  3/8/2019        Endocervical polyp ICD-10-CM: N84.1  ICD-9-CM: 622.7  3/8/2019        Mass of right ovary ICD-10-CM: N83.8  ICD-9-CM: 620.8  1/22/2019        Thickened endometrium ICD-10-CM: R93.89  ICD-9-CM: 793.5  1/22/2019        Advance directive discussed with patient ICD-10-CM: Z71.89  ICD-9-CM: V65.49  3/13/2018    Overview Signed 3/13/2018 11:13 AM by Edenilson Villalobos MD     3/13/2018 - -patient has an established AD. Will bring copy in for chart. H/O bone density study (Chronic) ICD-10-CM: Z92.89  ICD-9-CM: V15.89  9/9/2016    Overview Addendum 9/11/2019  1:09 PM by Edenilson Villalobos MD     [facility] [place],9/4/19- osteopenia. Followed by Dr. 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 Remigio Cheek polyps. Follow up in 5 years.               History of screening mammography (Chronic) ICD-10-CM: S59.64  ICD-9-CM: V15.89  7/10/2012    Overview Addendum 3/26/2021  4:02 PM by Edenilson Villalobos MD     [facility], 11/2/20             Pap smear for cervical cancer screening (Chronic) ICD-10-CM: Z12.4  ICD-9-CM: V76.2  7/10/2012    Overview Addendum 3/10/2017 11:02 AM by Edenilson Villalobos MD     6/16, [facility]             Hypothyroid ICD-10-CM: E03.9  ICD-9-CM: 244.9 12/7/2011        Depression ICD-10-CM: F32.9  ICD-9-CM: 789  12/7/2011        HTN (hypertension) ICD-10-CM: I10  ICD-9-CM: 401.9  12/7/2011        Fibrocystic breast ICD-10-CM: N60.19  ICD-9-CM: 610.1  12/7/2011        S/p tibial fracture ICD-10-CM: Z87.81  ICD-9-CM: V54.16  12/7/2011    Overview Signed 12/7/2011  2:17 PM by Darrius Solorzano MD     Right with venkatesh placement, 1995             S/P hemorrhoidectomy ICD-10-CM: J14.006, Z87.19  ICD-9-CM: V45.89  12/7/2011    Overview Signed 12/7/2011  2:18 PM by MD Dr. Jeanette Stone             Environmental allergies ICD-10-CM: Z91.09  ICD-9-CM: V15.09  12/7/2011        S/P laparoscopic cholecystectomy ICD-10-CM: K28.44  ICD-9-CM: V45.89  12/7/2011        Hiatal hernia ICD-10-CM: K44.9  ICD-9-CM: 553.3  12/7/2011    Overview Signed 12/7/2011  2:18 PM by Darrius Solorzano MD     egd 11/9/10, Dr. Margret Garces Medications on File Prior to Visit   Medication Sig    FLUoxetine (PROzac) 20 mg capsule TAKE 1 CAPSULE DAILY    simvastatin (ZOCOR) 40 mg tablet TAKE 1 TABLET NIGHTLY    Synthroid 100 mcg tablet TAKE 1 TABLET DAILY BEFORE BREAKFAST    calcium carbonate (TUMS) 200 mg calcium (500 mg) chew Take 1 Tab by mouth daily. As needed    aspirin delayed-release 81 mg tablet Take  by mouth daily.  coenzyme q10 300 mg cap Take 300 mg by mouth daily.  LACTOBACILLUS ACIDOPHILUS (PROBIOTIC PO) Take 1 Cap by mouth daily.  BOSWELLIA SURENDRA EXTRACT (BOSWELLIA SURENDRA XT, BULK,) Take  by mouth daily. Anti-inflammatory supplement. Takes one cap po daily.  MULTIVITAMIN PO Take  by mouth daily. Takes one po daily. No current facility-administered medications on file prior to visit.         CARDIOLOGY STUDIES TO DATE:  12/11 normal stress echo at [facility] (notes reviewed and summarized under media tab)    5/16 score 188, 80th percentile  6/17 normal stress echo  9/19 normal stress echo    Chief Complaint   Patient presents with    Follow-up HPI :  She is doing great. She is active and exercising some though not a lot weight is stable and she is having no problems with her medications. Recent lipid profile did not look too good but turns out they had spent about 10 days in [place] and really splurge in terms of her diet. They have plans to go to [facility] end of this month for 2 weeks and their main activity there is riding bikes. CARDIAC ROS:   negative for chest pain, dyspnea, palpitations, syncope, orthopnea, paroxysmal nocturnal dyspnea, exertional chest pressure/discomfort, claudication, lower extremity edema    Family History   Problem Relation Age of Onset    Alcohol abuse Mother         cirrhosis    Alcohol abuse Father     Other Father         cerebral hemorrhage    Cancer Brother         prostate    Heart Disease Brother 46        MI(age 46), bypass(66)    Diabetes Maternal Aunt     Hypertension Maternal Aunt     Cancer Maternal Uncle         COLON    Heart Disease Maternal Grandmother         MI    Cancer Cousin         BREAST    Anesth Problems Neg Hx        Past Medical History:   Diagnosis Date    Arthritis     lower back    Cancer ([facility] [address]     BCCAs removed - 6 on legs    Depression     Environmental allergies 12/7/2011    Fibrocystic breast 12/7/2011    GERD (gastroesophageal reflux disease)     Hiatal hernia 12/7/2011    HIGH BLOOD PRESSURE     not on meds    HTN (hypertension) 12/7/2011    Hypothyroid 12/7/2011    Nausea & vomiting     S/P hemorrhoidectomy 12/7/2011    S/P laparoscopic cholecystectomy 12/7/2011    S/p tibial fracture 12/7/2011    Tendonitis of wrist, left 12/2017    cortisone shot    Thyroid disorder        GENERAL ROS:  A comprehensive review of systems was negative except for that written in the HPI.     Visit Vitals  /80 (BP 1 Location: Left upper arm, BP Patient Position: Sitting, BP Cuff Size: Adult)   Pulse 62   Resp 18   Ht 5' 4\" (1.626 m)   Wt 153 lb (69.4 kg)   SpO2 98% BMI 26.26 kg/m²       Wt Readings from Last 3 Encounters:   04/08/21 153 lb (69.4 kg)   03/23/21 154 lb 9.6 oz (70.1 kg)   09/10/20 153 lb (69.4 kg)            BP Readings from Last 3 Encounters:   04/08/21 122/80   03/23/21 (!) 140/76   09/10/20 158/76       PHYSICAL EXAM  General appearance: alert, cooperative, no distress, appears stated age  Neurologic: Alert and oriented X 3  Neck: supple, symmetrical, trachea midline, no adenopathy, no carotid bruit and no JVD  Lungs: clear to auscultation bilaterally  Heart: regular rate and rhythm, S1, S2 normal, no murmur, click, rub or gallop  Extremities: extremities normal, atraumatic, no cyanosis or edema    Lab Results   Component Value Date/Time    Cholesterol, total 236 (H) 03/23/2021 10:32 AM    Cholesterol, total 229 (H) 08/07/2020 12:00 AM    Cholesterol, total 183 02/04/2020 12:28 PM    Cholesterol, total 192 05/07/2019 09:59 AM    Cholesterol, total 180 11/02/2018 10:30 AM    HDL Cholesterol 85 03/23/2021 10:32 AM    HDL Cholesterol 79 08/07/2020 12:00 AM    HDL Cholesterol 63 02/04/2020 12:28 PM    HDL Cholesterol 65 05/07/2019 09:59 AM    HDL Cholesterol 63 11/02/2018 10:30 AM    LDL, calculated 122.8 (H) 03/23/2021 10:32 AM    LDL, calculated 116 (H) 08/07/2020 12:00 AM    LDL, calculated 104 (H) 02/04/2020 12:28 PM    LDL, calculated 105 (H) 05/07/2019 09:59 AM    LDL, calculated 94 11/02/2018 10:30 AM    Triglyceride 141 03/23/2021 10:32 AM    Triglyceride 169 (H) 08/07/2020 12:00 AM    Triglyceride 78 02/04/2020 12:28 PM    Triglyceride 110 05/07/2019 09:59 AM    Triglyceride 114 11/02/2018 10:30 AM    CHOL/HDL Ratio 2.8 03/23/2021 10:32 AM    CHOL/HDL Ratio 2.4 07/15/2010 11:10 AM     ASSESSMENT :      She is stable and asymptomatic, well compensated on a good medical regimen and needs no cardiac testing at this time.   current treatment plan is effective, no change in therapy  lab results and schedule of future lab studies reviewed with patient  reviewed diet, exercise and weight control    Encounter Diagnoses   Name Primary?  Agatston CAC score 100-199 Yes    Essential hypertension     Mixed hyperlipidemia      No orders of the defined types were placed in this encounter. Follow-up and Dispositions    · Return in about 1 year (around 4/8/2022). Jen Beebe MD  4/8/2021  Please note that this dictation was completed with Tellyo, the computer voice recognition software. Quite often unanticipated grammatical, syntax, homophones, and other interpretive errors are inadvertently transcribed by the computer software. Please disregard these errors. Please excuse any errors that have escaped final proofreading. Thank you.

## 2021-09-13 ENCOUNTER — OFFICE VISIT (OUTPATIENT)
Dept: INTERNAL MEDICINE CLINIC | Age: 71
End: 2021-09-13
Payer: MEDICARE

## 2021-09-13 VITALS
OXYGEN SATURATION: 97 % | HEART RATE: 78 BPM | TEMPERATURE: 97.1 F | DIASTOLIC BLOOD PRESSURE: 70 MMHG | SYSTOLIC BLOOD PRESSURE: 144 MMHG | RESPIRATION RATE: 14 BRPM | BODY MASS INDEX: 26.22 KG/M2 | WEIGHT: 153.6 LBS | HEIGHT: 64 IN

## 2021-09-13 DIAGNOSIS — H92.02 LEFT EAR PAIN: Primary | ICD-10-CM

## 2021-09-13 PROCEDURE — G0463 HOSPITAL OUTPT CLINIC VISIT: HCPCS | Performed by: INTERNAL MEDICINE

## 2021-09-13 PROCEDURE — G8399 PT W/DXA RESULTS DOCUMENT: HCPCS | Performed by: INTERNAL MEDICINE

## 2021-09-13 PROCEDURE — G8419 CALC BMI OUT NRM PARAM NOF/U: HCPCS | Performed by: INTERNAL MEDICINE

## 2021-09-13 PROCEDURE — 1101F PT FALLS ASSESS-DOCD LE1/YR: CPT | Performed by: INTERNAL MEDICINE

## 2021-09-13 PROCEDURE — G8754 DIAS BP LESS 90: HCPCS | Performed by: INTERNAL MEDICINE

## 2021-09-13 PROCEDURE — 3017F COLORECTAL CA SCREEN DOC REV: CPT | Performed by: INTERNAL MEDICINE

## 2021-09-13 PROCEDURE — 1090F PRES/ABSN URINE INCON ASSESS: CPT | Performed by: INTERNAL MEDICINE

## 2021-09-13 PROCEDURE — G8536 NO DOC ELDER MAL SCRN: HCPCS | Performed by: INTERNAL MEDICINE

## 2021-09-13 PROCEDURE — G8753 SYS BP > OR = 140: HCPCS | Performed by: INTERNAL MEDICINE

## 2021-09-13 PROCEDURE — G9717 DOC PT DX DEP/BP F/U NT REQ: HCPCS | Performed by: INTERNAL MEDICINE

## 2021-09-13 PROCEDURE — G8427 DOCREV CUR MEDS BY ELIG CLIN: HCPCS | Performed by: INTERNAL MEDICINE

## 2021-09-13 PROCEDURE — G9899 SCRN MAM PERF RSLTS DOC: HCPCS | Performed by: INTERNAL MEDICINE

## 2021-09-13 PROCEDURE — 99213 OFFICE O/P EST LOW 20 MIN: CPT | Performed by: INTERNAL MEDICINE

## 2021-09-13 RX ORDER — DOXYCYCLINE HYCLATE 100 MG
100 TABLET ORAL 2 TIMES DAILY
COMMUNITY
Start: 2021-08-27

## 2021-09-13 NOTE — PROGRESS NOTES
Chief Complaint   Patient presents with    Ear Pain     Reviewed record in preparation for visit and have obtained necessary documentation. Identified pt with two pt identifiers(name and ).       Health Maintenance Due   Topic    Shingrix Vaccine Age 49> (1 of 2)    Medicare Yearly Exam     Flu Vaccine (1)         Chief Complaint   Patient presents with    Ear Pain        Wt Readings from Last 3 Encounters:   21 153 lb 9.6 oz (69.7 kg)   21 153 lb (69.4 kg)   21 154 lb 9.6 oz (70.1 kg)     Temp Readings from Last 3 Encounters:   21 97.1 °F (36.2 °C) (Temporal)   21 97.5 °F (36.4 °C) (Temporal)   20 (!) 95.8 °F (35.4 °C) (Temporal)     BP Readings from Last 3 Encounters:   21 (!) 144/70   21 122/80   21 (!) 140/76     Pulse Readings from Last 3 Encounters:   21 78   21 62   21 66           Learning Assessment:  :     Learning Assessment 2019 3/13/2018 2015 2014 2014 1/10/2013   PRIMARY LEARNER Patient Patient Patient Patient Patient Patient   HIGHEST LEVEL OF EDUCATION - PRIMARY LEARNER  GRADUATED HIGH SCHOOL OR GED GRADUATED HIGH SCHOOL OR GED GRADUATED HIGH SCHOOL OR GED GRADUATED HIGH SCHOOL OR GED GRADUATED HIGH SCHOOL OR GED -   BARRIERS PRIMARY LEARNER NONE NONE NONE NONE NONE -   CO-LEARNER CAREGIVER No No No No No -   PRIMARY LANGUAGE ENGLISH ENGLISH ENGLISH ENGLISH ENGLISH ENGLISH    NEED - - No - - -   LEARNER PREFERENCE PRIMARY VIDEOS DEMONSTRATION LISTENING LISTENING DEMONSTRATION DEMONSTRATION     - - DEMONSTRATION DEMONSTRATION - -   LEARNING SPECIAL TOPICS - - no - - -   ANSWERED BY patient patient patient Cassandra Thacker patient patient   RELATIONSHIP SELF SELF SELF SELF SELF SELF       Depression Screening:  :     3 most recent [facility] Screens 3/23/2021   [facility] Not Done -   Little interest or pleasure in doing things Not at all   Feeling down, depressed, irritable, or hopeless Not at all   Total Score PHQ 2 0       Fall Risk Assessment:  :     Fall Risk Assessment, last 12 mths 9/13/2021   Able to walk? Yes   Fall in past 12 months? 0   Do you feel unsteady? 0   Are you worried about falling 0       Abuse Screening:  :     Abuse Screening Questionnaire 8/7/2020 3/15/2019 7/6/2018 9/11/2017 3/16/2015 3/18/2014   Do you ever feel afraid of your partner? N N N N N N   Are you in a relationship with someone who physically or mentally threatens you? N N N N N N   Is it safe for you to go home? Y Y Y Y Y Y       Coordination of Care Questionnaire:  :     1) Have you been to an emergency room, urgent care clinic since your last visit? no   Hospitalized since your last visit? no             2) Have you seen or consulted any other health care providers outside of [address] [place] [address] since your last visit? yes  (Include any pap smears or colon screenings in this section.)    3) Do you have an Advance Directive on file? no    4) Are you interested in receiving information on Advance Directives? NO      Patient is accompanied by self I have received verbal consent from Danyel Bello to discuss any/all medical information while they are present in the room. Reviewed record  In preparation for visit and have obtained necessary documentation.

## 2021-09-13 NOTE — PROGRESS NOTES
Acute Care Note    Barbi Archibald is 65 y.o. female. she presents for evaluation of Ear Pain      A week ago she started with ear pain. On the left. Also some throat soreness which was associated with the ear pain. She has had no hearing difficulty. No dizziness, no cough. We discussed that she has a history of Ocular cicatricial pemphigoid and is on doxycycline from her ophthalmalogist for this (2 week course). She has taken tylenol for this. She feels the pain is slightly better since its onset. Prior to Admission medications    Medication Sig Start Date End Date Taking? Authorizing Provider   FLUoxetine (PROzac) 20 mg capsule TAKE 1 CAPSULE DAILY 8/17/21  Yes Jorge Barcenas MD   simvastatin (ZOCOR) 40 mg tablet TAKE 1 TABLET NIGHTLY 5/10/21  Yes Chastity Milton MD   Synthroid 100 mcg tablet TAKE 1 TABLET DAILY BEFORE BREAKFAST 4/19/21  Yes Jorge Barcenas MD   calcium carbonate (TUMS) 200 mg calcium (500 mg) chew Take 1 Tab by mouth daily. As needed   Yes Provider, Historical   aspirin delayed-release 81 mg tablet Take  by mouth daily. Yes Provider, Historical   coenzyme q10 300 mg cap Take 300 mg by mouth daily. Yes Provider, Historical   LACTOBACILLUS ACIDOPHILUS (PROBIOTIC PO) Take 1 Cap by mouth daily. Yes Provider, Historical   BOSWELLIA SURENDRA EXTRACT (BOSWELLIA SURENDRA XT, BULK,) Take  by mouth daily. Anti-inflammatory supplement. Takes one cap po daily. Yes Provider, Historical   MULTIVITAMIN PO Take  by mouth daily. Takes one po daily.    Yes Provider, Historical   doxycycline (VIBRA-TABS) 100 mg tablet  8/27/21   Provider, Historical         Patient Active Problem List   Diagnosis Code    Hypothyroid E03.9    Depression F32.9    HTN (hypertension) I10    Fibrocystic breast N60.19    S/p tibial fracture Z87.81    S/P hemorrhoidectomy Z98.890, Z87.19    Environmental allergies Z91.09    S/P laparoscopic cholecystectomy Z90.49    Hiatal hernia K44.9    Colon cancer screening Z12.11    History of screening mammography Z92.89    Pap smear for cervical cancer screening Z12.4    Hyperlipidemia E78.5    Nuclear cataract [id number]    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    Agatston CAC score 100-199 R93.1    H/O bone density study Z92.89    Advance directive discussed with patient Z70.80    Mass of right ovary N83.8    Thickened endometrium R93.89    Endometrial polyp N84.0    Endocervical polyp N84.1         Review of Systems   HENT: Positive for ear pain. Respiratory: Negative. Cardiovascular: Negative. Visit Vitals  BP (!) 144/70 (BP 1 Location: Left arm, BP Patient Position: Sitting, BP Cuff Size: Adult)   Pulse 78   Temp 97.1 °F (36.2 °C) (Temporal)   Resp 14   Ht 5' 4\" (1.626 m)   Wt 153 lb 9.6 oz (69.7 kg)   SpO2 97%   BMI 26.37 kg/m²       Physical Exam  Vitals reviewed. Constitutional:       Appearance: Normal appearance. Cardiovascular:      Rate and Rhythm: Normal rate and regular rhythm. Pulmonary:      Effort: Pulmonary effort is normal.      Breath sounds: Normal breath sounds. Neurological:      Mental Status: She is alert. ASSESSMENT/PLAN  Diagnoses and all orders for this visit:    1. Left ear pain - Unclear etiology for the present ear pain. We discussed that she may have some irritation at the inner ear vs a viral etiology. For now, advised tylenol, hydration and monitoring. If not improving, she may need ENT evaluation. Advised the patient to call back or return to office if symptoms worsen/change/persist.   Discussed expected course/resolution/complications of diagnosis in detail with patient. Medication risks/benefits/costs/interactions/alternatives discussed with patient. The patient was given an after visit summary which includes diagnoses, current medications, & vitals. They expressed understanding with the diagnosis and plan.

## 2021-09-24 RX ORDER — LEVOTHYROXINE SODIUM 100 UG/1
100 TABLET ORAL
Qty: 90 TABLET | Refills: 1 | Status: SHIPPED | OUTPATIENT
Start: 2021-09-24 | End: 2022-03-23

## 2021-09-24 NOTE — TELEPHONE ENCOUNTER
Requested Prescriptions     Pending Prescriptions Disp Refills    Synthroid 100 mcg tablet 90 Tablet 1     Sig: Take 1 Tablet by mouth Daily (before breakfast).          [address], [place] - [facility] [address]amar [address] ADFW  [phone]

## 2021-09-24 NOTE — TELEPHONE ENCOUNTER
Last OV 9/13/2021  Future Appointments   Date Time Provider Hannah Jessica   9/28/2021 10:00 AM Timothy Andrews MD [facility] ISRAEL THOMAS AMB   10/26/2021  1:00 PM MD OSCAR Padilla AMB

## 2021-09-28 ENCOUNTER — OFFICE VISIT (OUTPATIENT)
Dept: INTERNAL MEDICINE CLINIC | Age: 71
End: 2021-09-28
Payer: MEDICARE

## 2021-09-28 VITALS
HEART RATE: 63 BPM | SYSTOLIC BLOOD PRESSURE: 138 MMHG | OXYGEN SATURATION: 96 % | TEMPERATURE: 97.6 F | BODY MASS INDEX: 26.22 KG/M2 | DIASTOLIC BLOOD PRESSURE: 70 MMHG | WEIGHT: 153.6 LBS | HEIGHT: 64 IN | RESPIRATION RATE: 16 BRPM

## 2021-09-28 DIAGNOSIS — Z00.00 MEDICARE ANNUAL WELLNESS VISIT, SUBSEQUENT: Primary | ICD-10-CM

## 2021-09-28 DIAGNOSIS — F32.A DEPRESSION, UNSPECIFIED DEPRESSION TYPE: ICD-10-CM

## 2021-09-28 DIAGNOSIS — E03.9 HYPOTHYROIDISM, ADULT: ICD-10-CM

## 2021-09-28 DIAGNOSIS — Z79.899 ENCOUNTER FOR LONG-TERM (CURRENT) USE OF MEDICATIONS: ICD-10-CM

## 2021-09-28 DIAGNOSIS — Z13.31 SCREENING FOR DEPRESSION: ICD-10-CM

## 2021-09-28 DIAGNOSIS — E78.2 MIXED HYPERLIPIDEMIA: ICD-10-CM

## 2021-09-28 DIAGNOSIS — I10 BENIGN HYPERTENSION: ICD-10-CM

## 2021-09-28 LAB
ALBUMIN SERPL-MCNC: 4 G/DL (ref 3.5–5)
ALBUMIN/GLOB SERPL: 1.4 {RATIO} (ref 1.1–2.2)
ALP SERPL-CCNC: 105 U/L (ref 45–117)
ALT SERPL-CCNC: 45 U/L (ref 12–78)
ANION GAP SERPL CALC-SCNC: 5 MMOL/L (ref 5–15)
AST SERPL-CCNC: 28 U/L (ref 15–37)
BASOPHILS # BLD: 0 K/UL (ref 0–0.1)
BASOPHILS NFR BLD: 1 % (ref 0–1)
BILIRUB SERPL-MCNC: 0.5 MG/DL (ref 0.2–1)
BUN SERPL-MCNC: 17 MG/DL (ref 6–20)
BUN/CREAT SERPL: 24 (ref 12–20)
CALCIUM SERPL-MCNC: 9.9 MG/DL (ref 8.5–10.1)
CHLORIDE SERPL-SCNC: 105 MMOL/L (ref 97–108)
CHOLEST SERPL-MCNC: 204 MG/DL
CO2 SERPL-SCNC: 28 MMOL/L (ref 21–32)
CREAT SERPL-MCNC: 0.7 MG/DL (ref 0.55–1.02)
DIFFERENTIAL METHOD BLD: ABNORMAL
EOSINOPHIL # BLD: 0.1 K/UL (ref 0–0.4)
EOSINOPHIL NFR BLD: 3 % (ref 0–7)
ERYTHROCYTE [DISTWIDTH] IN BLOOD BY AUTOMATED COUNT: 12.1 % (ref 11.5–14.5)
GLOBULIN SER CALC-MCNC: 2.9 G/DL (ref 2–4)
GLUCOSE SERPL-MCNC: 103 MG/DL (ref 65–100)
HCT VFR BLD AUTO: 42.7 % (ref 35–47)
HDLC SERPL-MCNC: 87 MG/DL
HDLC SERPL: 2.3 {RATIO} (ref 0–5)
HGB BLD-MCNC: 13.6 G/DL (ref 11.5–16)
IMM GRANULOCYTES # BLD AUTO: 0 K/UL (ref 0–0.04)
IMM GRANULOCYTES NFR BLD AUTO: 0 % (ref 0–0.5)
LDLC SERPL CALC-MCNC: 95.6 MG/DL (ref 0–100)
LYMPHOCYTES # BLD: 1 K/UL (ref 0.8–3.5)
LYMPHOCYTES NFR BLD: 27 % (ref 12–49)
MCH RBC QN AUTO: 32.5 PG (ref 26–34)
MCHC RBC AUTO-ENTMCNC: 31.9 G/DL (ref 30–36.5)
MCV RBC AUTO: 101.9 FL (ref 80–99)
MONOCYTES # BLD: 0.5 K/UL (ref 0–1)
MONOCYTES NFR BLD: 14 % (ref 5–13)
NEUTS SEG # BLD: 2.1 K/UL (ref 1.8–8)
NEUTS SEG NFR BLD: 55 % (ref 32–75)
NRBC # BLD: 0 K/UL (ref 0–0.01)
NRBC BLD-RTO: 0 PER 100 WBC
PLATELET # BLD AUTO: 298 K/UL (ref 150–400)
PMV BLD AUTO: 10.3 FL (ref 8.9–12.9)
POTASSIUM SERPL-SCNC: 4.7 MMOL/L (ref 3.5–5.1)
PROT SERPL-MCNC: 6.9 G/DL (ref 6.4–8.2)
RBC # BLD AUTO: 4.19 M/UL (ref 3.8–5.2)
SODIUM SERPL-SCNC: 138 MMOL/L (ref 136–145)
T4 FREE SERPL-MCNC: 1.2 NG/DL (ref 0.8–1.5)
TRIGL SERPL-MCNC: 107 MG/DL (ref ?–150)
TSH SERPL DL<=0.05 MIU/L-ACNC: 1.21 UIU/ML (ref 0.36–3.74)
VLDLC SERPL CALC-MCNC: 21.4 MG/DL
WBC # BLD AUTO: 3.8 K/UL (ref 3.6–11)

## 2021-09-28 PROCEDURE — G8754 DIAS BP LESS 90: HCPCS | Performed by: INTERNAL MEDICINE

## 2021-09-28 PROCEDURE — G9717 DOC PT DX DEP/BP F/U NT REQ: HCPCS | Performed by: INTERNAL MEDICINE

## 2021-09-28 PROCEDURE — G8419 CALC BMI OUT NRM PARAM NOF/U: HCPCS | Performed by: INTERNAL MEDICINE

## 2021-09-28 PROCEDURE — G8752 SYS BP LESS 140: HCPCS | Performed by: INTERNAL MEDICINE

## 2021-09-28 PROCEDURE — G8536 NO DOC ELDER MAL SCRN: HCPCS | Performed by: INTERNAL MEDICINE

## 2021-09-28 PROCEDURE — G8427 DOCREV CUR MEDS BY ELIG CLIN: HCPCS | Performed by: INTERNAL MEDICINE

## 2021-09-28 PROCEDURE — G9899 SCRN MAM PERF RSLTS DOC: HCPCS | Performed by: INTERNAL MEDICINE

## 2021-09-28 PROCEDURE — G8399 PT W/DXA RESULTS DOCUMENT: HCPCS | Performed by: INTERNAL MEDICINE

## 2021-09-28 PROCEDURE — 3017F COLORECTAL CA SCREEN DOC REV: CPT | Performed by: INTERNAL MEDICINE

## 2021-09-28 PROCEDURE — 1101F PT FALLS ASSESS-DOCD LE1/YR: CPT | Performed by: INTERNAL MEDICINE

## 2021-09-28 PROCEDURE — G0439 PPPS, SUBSEQ VISIT: HCPCS | Performed by: INTERNAL MEDICINE

## 2021-09-28 NOTE — PROGRESS NOTES
Assessment/Plan:     1. Benign hypertension  -I evaluated and recommended to continue current doses of medications. - CBC WITH AUTOMATED DIFF; Future  - METABOLIC PANEL, COMPREHENSIVE; Future    2. Mixed hyperlipidemia  -last done in March, 2021, but elevated as she had just returned from a vacation to [facility]. -recheck today.   -has elevated calcium score. - METABOLIC PANEL, COMPREHENSIVE; Future  - LIPID PANEL; Future    3. Hypothyroidism, adult  -appears clinically euthyroid.   -check levels today.     - TSH 3RD GENERATION; Future  - T4, FREE; Future    4. Depression, unspecified depression type  -I evaluated and recommended to continue current doses of medications. 5. Encounter for long-term (current) use of medications      6. Medicare annual wellness visit, subsequent  -completed today.   -[facility] decision maker reviewed with patient and updated. - DEPRESSION SCREEN ANNUAL    7. Screening for depression    - DEPRESSION SCREEN ANNUAL     Orders Placed This Encounter    ANNUAL DEPRESSION SCREEN 8-15 MIN    CBC WITH AUTOMATED DIFF     Standing Status:   Future     Standing Expiration Date:   7/63/1583    METABOLIC PANEL, COMPREHENSIVE     Standing Status:   Future     Standing Expiration Date:   9/28/2022    LIPID PANEL     Standing Status:   Future     Standing Expiration Date:   9/28/2022    TSH 3RD GENERATION     Standing Status:   Future     Standing Expiration Date:   9/28/2022    T4, FREE     Standing Status:   Future     Standing Expiration Date:   9/28/2022        Follow-up Disposition:     Follow up in 6 months               Subjective: Reema Raya is a 65 y.o. female who presents today for follow up of her hypertension, hyperlipidemia and hypothyroid. Since last visit 3/23/2021:  -Dr. 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 Rafi Zhou now treating. He is [facility], but will be moving to [facility]. Both eyes, left greater than right.      -blood pressures at home systolic in the 537'Z. Patient Care Team:  -Dr. Brina Nicole dermatology  -Dr. Elizabeth Shelton - benign ovarian cyst  -Dr. Shayna Payton  -Dr. Teresa Toro - eye care  -Dr. Shawn Herrera, cardiology - elevated calcium score         Objective: Wt Readings from Last 3 Encounters:   09/28/21 153 lb 9.6 oz (69.7 kg)   09/13/21 153 lb 9.6 oz (69.7 kg)   04/08/21 153 lb (69.4 kg)     BP Readings from Last 3 Encounters:   09/28/21 (!) 156/85   09/13/21 (!) 144/70   04/08/21 122/80     Visit Vitals  /70   Pulse 63   Temp 97.6 °F (36.4 °C) (Temporal)   Resp 16   Ht 5' 4\" (1.626 m)   Wt 153 lb 9.6 oz (69.7 kg)   SpO2 96%   BMI 26.37 kg/m²     General appearance: alert, cooperative, no distress, appears stated age  Head: Normocephalic, without obvious abnormality, atraumatic  Neck: supple, symmetrical, trachea midline, no adenopathy, no carotid bruit and no JVD  Lungs: clear to auscultation bilaterally  Heart: regular rate and rhythm, S1, S2 normal, no murmur, click, rub or gallop  Abdomen: soft, non-tender. Bowel sounds normal. No masses,  no organomegaly  Extremities: extremities normal, atraumatic, no cyanosis or edema  Pulses: 2+ and symmetric              Disclaimer:  Return if symptoms worsen or fail to improve. Advised patient to call back or return to office if symptoms worsen/change/persist.     Discussed expected course/resolution/complications of diagnosis in detail with patient. Medication risks/benefits/costs/interactions/alternatives discussed with patient. Patient was given an after visit summary which includes diagnoses, current medications, & vitals. Patient expressed understanding with the diagnosis and plan. This is the Subsequent Medicare Annual Wellness Exam, performed 12 months or more after the Initial AWV or the last Subsequent AWV    I have reviewed the patient's medical history in detail and updated the computerized patient record. Assessment/Plan   Education and counseling provided:  Are appropriate based on today's review and evaluation    1. Medicare annual wellness visit, subsequent  -     [facility]  2. Benign hypertension  3. Mixed hyperlipidemia  4. Hypothyroidism, adult  5. Depression, unspecified depression type  6. Encounter for long-term (current) use of medications  7. Screening for depression  -     DEPRESSION SCREEN ANNUAL       Depression Risk Factor Screening     3 most recent PHQ Screens 3/23/2021   PHQ Not Done -   Little interest or pleasure in doing things Not at all   Feeling down, depressed, irritable, or hopeless Not at all   Total Score PHQ 2 0       Alcohol Risk Screen    Do you average more than 1 drink per night or more than 7 drinks a week:  No    On any one occasion in the past three months have you have had more than 3 drinks containing alcohol:  No        Functional Ability and Level of Safety    Hearing: Hearing is good. Activities of Daily Living: The home contains: no safety equipment. Patient does total self care      Ambulation: with no difficulty     Fall Risk:  Fall Risk Assessment, last 12 mths 9/28/2021   Able to walk? Yes   Fall in past 12 months? 0   Do you feel unsteady?  0   Are you worried about falling 0      Abuse Screen:  Patient is not abused       Cognitive Screening    Has your family/caregiver stated any concerns about your memory: no         Health Maintenance Due     Health Maintenance Due   Topic Date Due    Shingrix Vaccine Age 49> (1 of 2) Never done    Flu Vaccine (1) 09/01/2021       Patient Care Team   Patient Care Team:  Tonja Celaya MD as PCP - General (Internal Medicine)  Tonja Celaya MD as PCP - [facility] Empaneled Provider  Vidal Turner MD as Physician (Orthopedic Surgery)  Marky Mo MD as Physician (Pain Management)  Shani العراقي MD as Physician (Ophthalmology)  Shiloh Arauz MD as Physician (Gynecology)  Jayden Mccloud MD (Cardiology)  Zeke Morse MD as Physician (Gastroenterology)    History     Patient Active Problem List   Diagnosis Code    Hypothyroid E03.9    Depression F32.9    HTN (hypertension) I10    Fibrocystic breast N60.19    S/p tibial fracture Z87.81    S/P hemorrhoidectomy Z98.890, Z87.19    Environmental allergies Z91.09    S/P laparoscopic cholecystectomy Z90.49    Hiatal hernia K44.9    Colon cancer screening Z12.11    History of screening mammography Z92.89    Pap smear for cervical cancer screening Z12.4    Hyperlipidemia E78.5    Nuclear cataract [id number]    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    Agatston CAC score 100-199 R93.1    H/O bone density study Z92.89    Advance directive discussed with patient Z70.80    Mass of right ovary N83.8    Thickened endometrium R93.89    Endometrial polyp N84.0    Endocervical polyp N84.1     Past Medical History:   Diagnosis Date    Arthritis     lower back    Cancer ([address]     BCCAs removed - 6 on legs    Depression     Environmental allergies 12/7/2011    Fibrocystic breast 12/7/2011    GERD (gastroesophageal reflux disease)     Hiatal hernia 12/7/2011    HIGH BLOOD PRESSURE     not on meds    HTN (hypertension) 12/7/2011    Hypothyroid 12/7/2011    Nausea & vomiting     S/P hemorrhoidectomy 12/7/2011    S/P laparoscopic cholecystectomy 12/7/2011    S/p tibial fracture 12/7/2011    Tendonitis of wrist, left 12/2017    cortisone shot    Thyroid disorder       Past Surgical History:   Procedure Laterality Date    ENDOSCOPY, COLON, DIAGNOSTIC  11/2010    ([place])-f/u 5 yrs.     HX BREAST LUMPECTOMY      right - benign    HX CHOLECYSTECTOMY      HX GI      hemorrhoidectomy    HX GYN  03/2019    d/c for thickened endometrium    HX ORTHOPAEDIC      surgery for tibial plateau fx - took bone from hip/has hardware knee to ankle     Current Outpatient Medications   Medication Sig Dispense Refill    Synthroid 100 mcg tablet Take 1 Tablet by mouth Daily (before breakfast). 90 Tablet 1    doxycycline (VIBRA-TABS) 100 mg tablet       FLUoxetine (PROzac) 20 mg capsule TAKE 1 CAPSULE DAILY 90 Capsule 1    simvastatin (ZOCOR) 40 mg tablet TAKE 1 TABLET NIGHTLY 90 Tab 1    calcium carbonate (TUMS) 200 mg calcium (500 mg) chew Take 1 Tab by mouth daily. As needed      aspirin delayed-release 81 mg tablet Take  by mouth daily.  coenzyme q10 300 mg cap Take 300 mg by mouth daily.  LACTOBACILLUS ACIDOPHILUS (PROBIOTIC PO) Take 1 Cap by mouth daily.  BOSWELLIA SURENDRA EXTRACT (BOSWELLIA SURENDRA XT, BULK,) Take  by mouth daily. Anti-inflammatory supplement. Takes one cap po daily.  MULTIVITAMIN PO Take  by mouth daily. Takes one po daily. Allergies   Allergen Reactions    Adhesive Hives    Cephalexin Diarrhea    Ciprofloxacin Diarrhea    E-Mycin [Erythromycin] Other (comments)     GI upset    Sudafed [Pseudoephedrine Hcl] Palpitations     Patient is not allergic any more    Zinc Itching       Family History   Problem Relation Age of Onset    Alcohol abuse Mother         cirrhosis    Alcohol abuse Father     Other Father         cerebral hemorrhage    Cancer Brother         prostate    Heart Disease Brother 46        MI(age 46), bypass(66)    Diabetes Maternal Aunt     Hypertension Maternal Aunt     Cancer Maternal Uncle         COLON    Heart Disease Maternal Grandmother         MI    Cancer Cousin         BREAST    Anesth Problems Neg Hx      Social History     Tobacco Use    Smoking status: Never Smoker    Smokeless tobacco: Never Used   Substance Use Topics    Alcohol use:  Yes     Alcohol/week: 7.0 standard drinks     Types: 7 Glasses of wine per week     Comment: kenzie Tucker MD

## 2021-09-28 NOTE — PATIENT INSTRUCTIONS

## 2021-10-26 ENCOUNTER — OFFICE VISIT (OUTPATIENT)
Dept: CARDIOLOGY CLINIC | Age: 71
End: 2021-10-26
Payer: MEDICARE

## 2021-10-26 VITALS
SYSTOLIC BLOOD PRESSURE: 130 MMHG | BODY MASS INDEX: 26.46 KG/M2 | OXYGEN SATURATION: 97 % | RESPIRATION RATE: 14 BRPM | HEIGHT: 64 IN | HEART RATE: 70 BPM | WEIGHT: 155 LBS | DIASTOLIC BLOOD PRESSURE: 84 MMHG

## 2021-10-26 DIAGNOSIS — E78.2 MIXED HYPERLIPIDEMIA: ICD-10-CM

## 2021-10-26 DIAGNOSIS — I10 ESSENTIAL HYPERTENSION: ICD-10-CM

## 2021-10-26 DIAGNOSIS — R93.1 AGATSTON CAC SCORE 100-199: Primary | ICD-10-CM

## 2021-10-26 DIAGNOSIS — Z82.49 FAMILY HISTORY OF PREMATURE CAD: ICD-10-CM

## 2021-10-26 PROCEDURE — G9717 DOC PT DX DEP/BP F/U NT REQ: HCPCS | Performed by: SPECIALIST

## 2021-10-26 PROCEDURE — 3017F COLORECTAL CA SCREEN DOC REV: CPT | Performed by: SPECIALIST

## 2021-10-26 PROCEDURE — 1090F PRES/ABSN URINE INCON ASSESS: CPT | Performed by: SPECIALIST

## 2021-10-26 PROCEDURE — G8752 SYS BP LESS 140: HCPCS | Performed by: SPECIALIST

## 2021-10-26 PROCEDURE — G8399 PT W/DXA RESULTS DOCUMENT: HCPCS | Performed by: SPECIALIST

## 2021-10-26 PROCEDURE — G8427 DOCREV CUR MEDS BY ELIG CLIN: HCPCS | Performed by: SPECIALIST

## 2021-10-26 PROCEDURE — G9899 SCRN MAM PERF RSLTS DOC: HCPCS | Performed by: SPECIALIST

## 2021-10-26 PROCEDURE — 99213 OFFICE O/P EST LOW 20 MIN: CPT | Performed by: SPECIALIST

## 2021-10-26 PROCEDURE — G8419 CALC BMI OUT NRM PARAM NOF/U: HCPCS | Performed by: SPECIALIST

## 2021-10-26 PROCEDURE — 1101F PT FALLS ASSESS-DOCD LE1/YR: CPT | Performed by: SPECIALIST

## 2021-10-26 PROCEDURE — G8536 NO DOC ELDER MAL SCRN: HCPCS | Performed by: SPECIALIST

## 2021-10-26 PROCEDURE — G8754 DIAS BP LESS 90: HCPCS | Performed by: SPECIALIST

## 2021-10-26 PROCEDURE — G0463 HOSPITAL OUTPT CLINIC VISIT: HCPCS | Performed by: SPECIALIST

## 2021-10-26 RX ORDER — LIFITEGRAST 50 MG/ML
1 SOLUTION/ DROPS OPHTHALMIC 2 TIMES DAILY
COMMUNITY
Start: 2021-08-27

## 2021-10-26 NOTE — PROGRESS NOTES
HISTORY OF PRESENT ILLNESS  Zoraida Weaver is a 65 y.o. female     SUMMARY:   Problem List  Date Reviewed: 10/26/2021        Codes Class Noted    Endometrial polyp ICD-10-CM: N84.0  ICD-9-CM: 621.0  3/8/2019        Endocervical polyp ICD-10-CM: N84.1  ICD-9-CM: 622.7  3/8/2019        Mass of right ovary ICD-10-CM: N83.8  ICD-9-CM: 620.8  1/22/2019        Thickened endometrium ICD-10-CM: R93.89  ICD-9-CM: 793.5  1/22/2019        Advance directive discussed with patient ICD-10-CM: Z71.89  ICD-9-CM: V65.49  3/13/2018    Overview Signed 3/13/2018 11:13 AM by Nona Barbosa MD     3/13/2018 - -patient has an established AD. Will bring copy in for chart. H/O bone density study (Chronic) ICD-10-CM: Z92.89  ICD-9-CM: V15.89  9/9/2016    Overview Addendum 9/11/2019  1:09 PM by Nona Barbosa MD     [facility],9/4/19- osteopenia. Followed by Dr. Sonal Smiley CAC score 100-199 ICD-10-CM: R93.1  ICD-9-CM: 793.2  6/10/2016    Overview Signed 6/10/2016  8:24 AM by Chon Heard MD     5/16 score 188, 80th percentile             Nuclear cataract ICD-10-CM: [id number]  ICD-9-CM: 366.16  2/1/2016        Benign neoplasm of choroid ICD-10-CM: D31.30  ICD-9-CM: 224.6  2/1/2016        Hyperlipidemia ICD-10-CM: E78.5  ICD-9-CM: 272.4  1/10/2013        Colon cancer screening (Chronic) ICD-10-CM: Z12.11  ICD-9-CM: V76.51  7/10/2012    Overview Addendum 3/26/2021  4:00 PM by Nona Barbosa MD     1/15/20 Dr. Victor Hugo Delacruz polyps. Follow up in 5 years.               History of screening mammography (Chronic) ICD-10-CM: X18.09  ICD-9-CM: V15.89  7/10/2012    Overview Addendum 3/26/2021  4:02 PM by Nona Barbosa MD     [facility], 11/2/20             Pap smear for cervical cancer screening (Chronic) ICD-10-CM: Z12.4  ICD-9-CM: V76.2  7/10/2012    Overview Addendum 3/10/2017 11:02 AM by Nona Barbosa MD     6/16, [facility]             Hypothyroid ICD-10-CM: E03.9  ICD-9-CM: 244.9 12/7/2011        Depression ICD-10-CM: F32. A  ICD-9-CM: 967  12/7/2011        HTN (hypertension) ICD-10-CM: I10  ICD-9-CM: 401.9  12/7/2011        Fibrocystic breast ICD-10-CM: N60.19  ICD-9-CM: 610.1  12/7/2011        S/p tibial fracture ICD-10-CM: Z87.81  ICD-9-CM: V54.16  12/7/2011    Overview Signed 12/7/2011  2:17 PM by Heriberto Renae MD     Right with venkatesh placement, 1995             S/P hemorrhoidectomy ICD-10-CM: P99.240, Z87.19  ICD-9-CM: V45.89  12/7/2011    Overview Signed 12/7/2011  2:18 PM by MD Dr. Marlon Solis             Environmental allergies ICD-10-CM: Z91.09  ICD-9-CM: V15.09  12/7/2011        S/P laparoscopic cholecystectomy ICD-10-CM: G52.12  ICD-9-CM: V45.89  12/7/2011        Hiatal hernia ICD-10-CM: K44.9  ICD-9-CM: 553.3  12/7/2011    Overview Signed 12/7/2011  2:18 PM by Heriberto Renae MD     egd 11/9/10, Dr. Mayra Barnhart Medications on File Prior to Visit   Medication Sig    lifitegrast (Xiidra) 5 % dpet Administer 1 Drop to both eyes two (2) times a day.  Synthroid 100 mcg tablet Take 1 Tablet by mouth Daily (before breakfast).  doxycycline (VIBRA-TABS) 100 mg tablet Take 100 mg by mouth two (2) times a day.  FLUoxetine (PROzac) 20 mg capsule TAKE 1 CAPSULE DAILY    simvastatin (ZOCOR) 40 mg tablet TAKE 1 TABLET NIGHTLY    calcium carbonate (TUMS) 200 mg calcium (500 mg) chew Take 1 Tab by mouth daily. As needed    aspirin delayed-release 81 mg tablet Take  by mouth daily.  coenzyme q10 300 mg cap Take 300 mg by mouth daily.  LACTOBACILLUS ACIDOPHILUS (PROBIOTIC PO) Take 1 Cap by mouth daily.  BOSWELLIA SURENDRA EXTRACT (BOSWELLIA SURENDRA XT, BULK,) Take  by mouth daily. Anti-inflammatory supplement. Takes one cap po daily.  MULTIVITAMIN PO Take  by mouth daily. Takes one po daily. No current facility-administered medications on file prior to visit.        CARDIOLOGY STUDIES TO DATE:  12/11 normal stress echo at [facility] (notes reviewed and summarized under media tab)    5/16 score 188, 80th percentile  6/17 normal stress echo  9/19 normal stress echo    Chief Complaint   Patient presents with    Follow-up     HPI :  She continues to do well. They have done some traveling just got back from 3 weeks in [facility] and she is walking some though not really exercising. Weight and blood pressure well controlled and lipid profile last month looked great  CARDIAC ROS:   negative for chest pain, dyspnea, palpitations, syncope, orthopnea, paroxysmal nocturnal dyspnea, exertional chest pressure/discomfort, claudication, lower extremity edema    Family History   Problem Relation Age of Onset    Alcohol abuse Mother         cirrhosis    Alcohol abuse Father     Other Father         cerebral hemorrhage    Cancer Brother         prostate    Heart Disease Brother 46        MI(age 46), bypass(66)    Diabetes Maternal Aunt     Hypertension Maternal Aunt     Cancer Maternal Uncle         COLON    Heart Disease Maternal Grandmother         MI    Cancer Cousin         BREAST    Anesth Problems Neg Hx        Past Medical History:   Diagnosis Date    Arthritis     lower back    Cancer ([facility] [address]     BCCAs removed - 6 on legs    Depression     Environmental allergies 12/7/2011    Fibrocystic breast 12/7/2011    GERD (gastroesophageal reflux disease)     Hiatal hernia 12/7/2011    HIGH BLOOD PRESSURE     not on meds    HTN (hypertension) 12/7/2011    Hypothyroid 12/7/2011    Nausea & vomiting     S/P hemorrhoidectomy 12/7/2011    S/P laparoscopic cholecystectomy 12/7/2011    S/p tibial fracture 12/7/2011    Tendonitis of wrist, left 12/2017    cortisone shot    Thyroid disorder        GENERAL ROS:  A comprehensive review of systems was negative except for that written in the HPI.     Visit Vitals  /84 (BP 1 Location: Left arm, BP Patient Position: Sitting, BP Cuff Size: Adult)   Pulse 70   Resp 14   Ht 5' 4\" (1.626 m)   Wt 155 lb (70.3 kg)   SpO2 97%   BMI 26.61 kg/m²       Wt Readings from Last 3 Encounters:   10/26/21 155 lb (70.3 kg)   09/28/21 153 lb 9.6 oz (69.7 kg)   09/13/21 153 lb 9.6 oz (69.7 kg)            BP Readings from Last 3 Encounters:   10/26/21 130/84   09/28/21 138/70   09/13/21 (!) 144/70       PHYSICAL EXAM  General appearance: alert, cooperative, no distress, appears stated age  Neurologic: Alert and oriented X 3  Neck: supple, symmetrical, trachea midline, no adenopathy, no carotid bruit and no JVD  Lungs: clear to auscultation bilaterally  Heart: regular rate and rhythm, S1, S2 normal, no murmur, click, rub or gallop  Extremities: extremities normal, atraumatic, no cyanosis or edema    Lab Results   Component Value Date/Time    Cholesterol, total 204 (H) 09/28/2021 01:24 PM    Cholesterol, total 236 (H) 03/23/2021 10:32 AM    Cholesterol, total 229 (H) 08/07/2020 12:00 AM    Cholesterol, total 183 02/04/2020 12:28 PM    Cholesterol, total 192 05/07/2019 09:59 AM    HDL Cholesterol 87 09/28/2021 01:24 PM    HDL Cholesterol 85 03/23/2021 10:32 AM    HDL Cholesterol 79 08/07/2020 12:00 AM    HDL Cholesterol 63 02/04/2020 12:28 PM    HDL Cholesterol 65 05/07/2019 09:59 AM    LDL, calculated 95.6 09/28/2021 01:24 PM    LDL, calculated 122.8 (H) 03/23/2021 10:32 AM    LDL, calculated 116 (H) 08/07/2020 12:00 AM    LDL, calculated 104 (H) 02/04/2020 12:28 PM    LDL, calculated 105 (H) 05/07/2019 09:59 AM    Triglyceride 107 09/28/2021 01:24 PM    Triglyceride 141 03/23/2021 10:32 AM    Triglyceride 169 (H) 08/07/2020 12:00 AM    Triglyceride 78 02/04/2020 12:28 PM    Triglyceride 110 05/07/2019 09:59 AM    CHOL/HDL Ratio 2.3 09/28/2021 01:24 PM    CHOL/HDL Ratio 2.8 03/23/2021 10:32 AM    CHOL/HDL Ratio 2.4 07/15/2010 11:10 AM     ASSESSMENT :      She is stable and asymptomatic, well compensated on a good medical regimen and needs no cardiac testing at this time.   current treatment plan is effective, no change in therapy  lab results and schedule of future lab studies reviewed with patient  reviewed diet, exercise and weight control    Encounter Diagnoses   Name Primary?  Agatston CAC score 100-199 Yes    Family history of premature CAD     Essential hypertension     Mixed hyperlipidemia      Orders Placed This Encounter    lifitegrast (Xiidra) 5 % dpet       Follow-up and Dispositions    · Return in about 6 months (around 4/26/2022). Yaz Zarate MD  10/26/2021  Please note that this dictation was completed with DrAvailable, the computer voice recognition software. Quite often unanticipated grammatical, syntax, homophones, and other interpretive errors are inadvertently transcribed by the computer software. Please disregard these errors. Please excuse any errors that have escaped final proofreading. Thank you.

## 2021-11-27 ENCOUNTER — HOSPITAL ENCOUNTER (OUTPATIENT)
Age: 71
Setting detail: OBSERVATION
Discharge: HOME | End: 2021-11-28
Attending: EMERGENCY MEDICINE | Admitting: HOSPITALIST
Payer: MEDICARE

## 2021-11-27 ENCOUNTER — APPOINTMENT (OUTPATIENT)
Dept: CT IMAGING | Age: 71
End: 2021-11-27
Attending: STUDENT IN AN ORGANIZED HEALTH CARE EDUCATION/TRAINING PROGRAM
Payer: MEDICARE

## 2021-11-27 ENCOUNTER — APPOINTMENT (OUTPATIENT)
Dept: CT IMAGING | Age: 71
End: 2021-11-27
Attending: EMERGENCY MEDICINE
Payer: MEDICARE

## 2021-11-27 ENCOUNTER — HOSPITAL ENCOUNTER (OUTPATIENT)
Dept: CT IMAGING | Age: 71
Setting detail: OBSERVATION
Discharge: HOME | End: 2021-11-27
Attending: HOSPITALIST
Payer: MEDICARE

## 2021-11-27 ENCOUNTER — NURSE TRIAGE (OUTPATIENT)
Dept: OTHER | Facility: CLINIC | Age: 71
End: 2021-11-27

## 2021-11-27 DIAGNOSIS — M54.2 NECK PAIN: ICD-10-CM

## 2021-11-27 DIAGNOSIS — R55 SYNCOPE, UNSPECIFIED SYNCOPE TYPE: Primary | ICD-10-CM

## 2021-11-27 LAB
ALBUMIN SERPL-MCNC: 4.2 G/DL (ref 3.5–5)
ALBUMIN/GLOB SERPL: 1.2 {RATIO} (ref 1.1–2.2)
ALP SERPL-CCNC: 114 U/L (ref 45–117)
ALT SERPL-CCNC: 41 U/L (ref 12–78)
ANION GAP SERPL CALC-SCNC: 4 MMOL/L (ref 5–15)
AST SERPL-CCNC: 24 U/L (ref 15–37)
ATRIAL RATE: 62 BPM
BASOPHILS # BLD: 0.1 K/UL (ref 0–0.1)
BASOPHILS NFR BLD: 1 % (ref 0–1)
BILIRUB SERPL-MCNC: 0.5 MG/DL (ref 0.2–1)
BUN SERPL-MCNC: 18 MG/DL (ref 6–20)
BUN/CREAT SERPL: 19 (ref 12–20)
CALCIUM SERPL-MCNC: 9.7 MG/DL (ref 8.5–10.1)
CALCULATED R AXIS, ECG10: 5 DEGREES
CALCULATED T AXIS, ECG11: -28 DEGREES
CHLORIDE SERPL-SCNC: 105 MMOL/L (ref 97–108)
CO2 SERPL-SCNC: 30 MMOL/L (ref 21–32)
COMMENT, HOLDF: NORMAL
CREAT SERPL-MCNC: 0.93 MG/DL (ref 0.55–1.02)
DIAGNOSIS, 93000: NORMAL
DIFFERENTIAL METHOD BLD: NORMAL
EOSINOPHIL # BLD: 0.1 K/UL (ref 0–0.4)
EOSINOPHIL NFR BLD: 2 % (ref 0–7)
ERYTHROCYTE [DISTWIDTH] IN BLOOD BY AUTOMATED COUNT: 11.9 % (ref 11.5–14.5)
GLOBULIN SER CALC-MCNC: 3.6 G/DL (ref 2–4)
GLUCOSE BLD STRIP.AUTO-MCNC: 176 MG/DL (ref 65–117)
GLUCOSE SERPL-MCNC: 87 MG/DL (ref 65–100)
HCT VFR BLD AUTO: 43.7 % (ref 35–47)
HGB BLD-MCNC: 14.8 G/DL (ref 11.5–16)
IMM GRANULOCYTES # BLD AUTO: 0 K/UL (ref 0–0.04)
IMM GRANULOCYTES NFR BLD AUTO: 0 % (ref 0–0.5)
LYMPHOCYTES # BLD: 1.5 K/UL (ref 0.8–3.5)
LYMPHOCYTES NFR BLD: 24 % (ref 12–49)
MCH RBC QN AUTO: 33.3 PG (ref 26–34)
MCHC RBC AUTO-ENTMCNC: 33.9 G/DL (ref 30–36.5)
MCV RBC AUTO: 98.2 FL (ref 80–99)
MONOCYTES # BLD: 0.6 K/UL (ref 0–1)
MONOCYTES NFR BLD: 10 % (ref 5–13)
NEUTS SEG # BLD: 4 K/UL (ref 1.8–8)
NEUTS SEG NFR BLD: 63 % (ref 32–75)
NRBC # BLD: 0 K/UL (ref 0–0.01)
NRBC BLD-RTO: 0 PER 100 WBC
P-R INTERVAL, ECG05: 140 MS
PLATELET # BLD AUTO: 311 K/UL (ref 150–400)
PMV BLD AUTO: 9.7 FL (ref 8.9–12.9)
POTASSIUM SERPL-SCNC: 4.1 MMOL/L (ref 3.5–5.1)
PROT SERPL-MCNC: 7.8 G/DL (ref 6.4–8.2)
Q-T INTERVAL, ECG07: 412 MS
QRS DURATION, ECG06: 68 MS
QTC CALCULATION (BEZET), ECG08: 418 MS
RBC # BLD AUTO: 4.45 M/UL (ref 3.8–5.2)
SAMPLES BEING HELD,HOLD: NORMAL
SERVICE CMNT-IMP: ABNORMAL
SODIUM SERPL-SCNC: 139 MMOL/L (ref 136–145)
TROPONIN-HIGH SENSITIVITY: 5 NG/L (ref 0–51)
TSH SERPL DL<=0.05 MIU/L-ACNC: 2.54 UIU/ML (ref 0.36–3.74)
VENTRICULAR RATE, ECG03: 62 BPM
WBC # BLD AUTO: 6.3 K/UL (ref 3.6–11)

## 2021-11-27 PROCEDURE — 74011250636 HC RX REV CODE- 250/636: Performed by: HOSPITALIST

## 2021-11-27 PROCEDURE — 96374 THER/PROPH/DIAG INJ IV PUSH: CPT

## 2021-11-27 PROCEDURE — 74011000636 HC RX REV CODE- 636: Performed by: RADIOLOGY

## 2021-11-27 PROCEDURE — 93005 ELECTROCARDIOGRAM TRACING: CPT

## 2021-11-27 PROCEDURE — G0378 HOSPITAL OBSERVATION PER HR: HCPCS

## 2021-11-27 PROCEDURE — 74011250637 HC RX REV CODE- 250/637: Performed by: HOSPITALIST

## 2021-11-27 PROCEDURE — 85025 COMPLETE CBC W/AUTO DIFF WBC: CPT

## 2021-11-27 PROCEDURE — 80053 COMPREHEN METABOLIC PANEL: CPT

## 2021-11-27 PROCEDURE — 84443 ASSAY THYROID STIM HORMONE: CPT

## 2021-11-27 PROCEDURE — 72125 CT NECK SPINE W/O DYE: CPT

## 2021-11-27 PROCEDURE — 99285 EMERGENCY DEPT VISIT HI MDM: CPT

## 2021-11-27 PROCEDURE — 70496 CT ANGIOGRAPHY HEAD: CPT

## 2021-11-27 PROCEDURE — 82962 GLUCOSE BLOOD TEST: CPT

## 2021-11-27 PROCEDURE — 71275 CT ANGIOGRAPHY CHEST: CPT

## 2021-11-27 PROCEDURE — 36415 COLL VENOUS BLD VENIPUNCTURE: CPT

## 2021-11-27 PROCEDURE — 70450 CT HEAD/BRAIN W/O DYE: CPT

## 2021-11-27 PROCEDURE — 84484 ASSAY OF TROPONIN QUANT: CPT

## 2021-11-27 RX ORDER — LEVOTHYROXINE SODIUM 100 UG/1
100 TABLET ORAL
Status: DISCONTINUED | OUTPATIENT
Start: 2021-11-28 | End: 2021-11-28 | Stop reason: HOSPADM

## 2021-11-27 RX ORDER — ATORVASTATIN CALCIUM 20 MG/1
40 TABLET, FILM COATED ORAL
Status: DISCONTINUED | OUTPATIENT
Start: 2021-11-27 | End: 2021-11-28 | Stop reason: HOSPADM

## 2021-11-27 RX ORDER — LISINOPRIL 10 MG/1
10 TABLET ORAL DAILY
Status: DISCONTINUED | OUTPATIENT
Start: 2021-11-27 | End: 2021-11-28 | Stop reason: HOSPADM

## 2021-11-27 RX ORDER — FLUOXETINE HYDROCHLORIDE 20 MG/1
20 CAPSULE ORAL DAILY
Status: DISCONTINUED | OUTPATIENT
Start: 2021-11-28 | End: 2021-11-28 | Stop reason: HOSPADM

## 2021-11-27 RX ORDER — DOXYCYCLINE HYCLATE 100 MG
100 TABLET ORAL 2 TIMES DAILY
Status: DISCONTINUED | OUTPATIENT
Start: 2021-11-28 | End: 2021-11-28

## 2021-11-27 RX ORDER — ASPIRIN 81 MG/1
81 TABLET ORAL DAILY
Status: DISCONTINUED | OUTPATIENT
Start: 2021-11-27 | End: 2021-11-28 | Stop reason: HOSPADM

## 2021-11-27 RX ORDER — HYDRALAZINE HYDROCHLORIDE 20 MG/ML
10 INJECTION INTRAMUSCULAR; INTRAVENOUS
Status: DISCONTINUED | OUTPATIENT
Start: 2021-11-27 | End: 2021-11-28 | Stop reason: HOSPADM

## 2021-11-27 RX ADMIN — ASPIRIN 81 MG: 81 TABLET, COATED ORAL at 17:15

## 2021-11-27 RX ADMIN — IOPAMIDOL 100 ML: 755 INJECTION, SOLUTION INTRAVENOUS at 14:48

## 2021-11-27 RX ADMIN — LISINOPRIL 10 MG: 10 TABLET ORAL at 17:16

## 2021-11-27 RX ADMIN — HYDRALAZINE HYDROCHLORIDE 10 MG: 20 INJECTION INTRAMUSCULAR; INTRAVENOUS at 17:15

## 2021-11-27 RX ADMIN — IOPAMIDOL 70 ML: 755 INJECTION, SOLUTION INTRAVENOUS at 20:09

## 2021-11-27 NOTE — PROGRESS NOTES
HP dictated    Admitted for Syncopal episode  Treat hypertension  Echo ordered may need MRI will let Neurology decide  CT no acute findings

## 2021-11-27 NOTE — ED TRIAGE NOTES
Pt c/o syncopal episode that yesterday at 1100. Pt reports she turned her head to the LEFT which caused her to pass out. Pt also reports LEFT sided neck tingling for several days. Pt reports after episode she feels like the top of head has been heavy.

## 2021-11-27 NOTE — PROGRESS NOTES
TRANSFER - IN REPORT:    Verbal report received from Suha(name) on Gaylon Buerger  being received from ED(unit) for routine progression of care      Report consisted of patients Situation, Background, Assessment and   Recommendations(SBAR). Information from the following report(s) SBAR, Kardex, ED Summary, Intake/Output, MAR and Recent Results was reviewed with the receiving nurse. Opportunity for questions and clarification was provided. Assessment completed upon patients arrival to unit and care assumed.

## 2021-11-27 NOTE — ED PROVIDER NOTES
Gus Zapien is a 67 yo F with h/o HTN, hypothyriid, GERD and basal cell skin cancer on legs who presents to the ED following syncopal episode yesterday. She states that she was setting out electric candles in her window and had turned to the left to get a new bulb for the candle and passed out. She states she woke leaning over the arm of her sofa. She denies chest pain, shortness of breath or nausea nor weakness or difficulty speaking. She also notes that she has had tingling pain along the left side of her neck for several days. Past Medical History:   Diagnosis Date    Arthritis     lower back    Cancer ([address]     BCCAs removed - 6 on legs    Depression     Environmental allergies 12/7/2011    Fibrocystic breast 12/7/2011    GERD (gastroesophageal reflux disease)     Hiatal hernia 12/7/2011    HIGH BLOOD PRESSURE     not on meds    HTN (hypertension) 12/7/2011    Hypothyroid 12/7/2011    Nausea & vomiting     S/P hemorrhoidectomy 12/7/2011    S/P laparoscopic cholecystectomy 12/7/2011    S/p tibial fracture 12/7/2011    Tendonitis of wrist, left 12/2017    cortisone shot    Thyroid disorder        Past Surgical History:   Procedure Laterality Date    ENDOSCOPY, COLON, DIAGNOSTIC  11/2010    ([place])-f/u 5 yrs.     HX BREAST LUMPECTOMY      right - benign    HX CHOLECYSTECTOMY      HX GI      hemorrhoidectomy    HX GYN  03/2019    d/c for thickened endometrium    HX ORTHOPAEDIC      surgery for tibial plateau fx - took bone from hip/has hardware knee to ankle         Family History:   Problem Relation Age of Onset    Alcohol abuse Mother         cirrhosis    Alcohol abuse Father     Other Father         cerebral hemorrhage    Cancer Brother         prostate    Heart Disease Brother 46        MI(age 46), bypass(66)    Diabetes Maternal Aunt     Hypertension Maternal Aunt     Cancer Maternal Uncle         COLON    Heart Disease Maternal Grandmother         MI    Cancer Cousin         BREAST    Anesth Problems Neg Hx        Social History     Socioeconomic History    Marital status:      Spouse name: Not on file    Number of children: Not on file    Years of education: Not on file    Highest education level: Not on file   Occupational History    Not on file   Tobacco Use    Smoking status: Never Smoker    Smokeless tobacco: Never Used   Substance and Sexual Activity    Alcohol use: Yes     Alcohol/week: 7.0 standard drinks     Types: 7 Glasses of wine per week     Comment: wine    Drug use: No    Sexual activity: Never     Partners: Male     Comment: partner has decided   Other Topics Concern    Not on file   Social History Narrative    Not on file     Social Determinants of Health     Financial Resource Strain:     Difficulty of Paying Living Expenses: Not on file   Food Insecurity:     Worried About Running Out of Food in the Last Year: Not on file    Honorio of Food in the Last Year: Not on file   Transportation Needs:     Lack of Transportation (Medical): Not on file    Lack of Transportation (Non-Medical):  Not on file   Physical Activity:     Days of Exercise per Week: Not on file    Minutes of Exercise per Session: Not on file   Stress:     Feeling of Stress : Not on file   Social Connections:     Frequency of Communication with Friends and Family: Not on file    Frequency of Social Gatherings with Friends and Family: Not on file    Attends Zoroastrian Services: Not on file    Active Member of Clubs or Organizations: Not on file    Attends Club or Organization Meetings: Not on file    Marital Status: Not on file   Intimate Partner Violence:     Fear of Current or Ex-Partner: Not on file    Emotionally Abused: Not on file    Physically Abused: Not on file    Sexually Abused: Not on file   Housing Stability:     Unable to Pay for Housing in the Last Year: Not on file    Number of Places Lived in the Last Year: Not on file    Unstable Housing in the Last Year: Not on file         ALLERGIES: Adhesive, Cephalexin, Ciprofloxacin, E-mycin [erythromycin], Sudafed [pseudoephedrine hcl], and Zinc    Review of Systems   Constitutional: Negative for fever. HENT: Negative for sore throat. Eyes: Negative for visual disturbance. Respiratory: Negative for cough. Cardiovascular: Positive for syncope. Negative for chest pain. Gastrointestinal: Negative for abdominal pain. Genitourinary: Negative for dysuria. Musculoskeletal: Positive for neck pain. Negative for back pain. Skin: Negative for rash. Neurological: Positive for syncope. Negative for headaches. Vitals:    11/27/21 1241   BP: (!) 198/80   Pulse: 69   Resp: 18   Temp: 97.8 °F (36.6 °C)   SpO2: 98%            Physical Exam  Vitals and nursing note reviewed. Constitutional:       General: She is not in acute distress. Appearance: She is well-developed. HENT:      Head: Normocephalic and atraumatic. Eyes:      Conjunctiva/sclera: Conjunctivae normal.   Neck:      Trachea: Phonation normal.   Cardiovascular:      Rate and Rhythm: Normal rate. Pulmonary:      Effort: Pulmonary effort is normal. No respiratory distress. Breath sounds: No wheezing or rales. Abdominal:      General: There is no distension. Musculoskeletal:         General: No tenderness. Normal range of motion. Cervical back: Normal range of motion. Skin:     General: Skin is warm and dry. Neurological:      Mental Status: She is alert. She is not disoriented. Cranial Nerves: No cranial nerve deficit, dysarthria or facial asymmetry. Sensory: Sensation is intact. Motor: Motor function is intact. No abnormal muscle tone or pronator drift. ED EKG interpretation:  Rhythm: normal sinus rhythm; and regular . Rate (approx.): 62; Axis: normal; P wave: normal; QRS interval: normal ; ST/T wave: normal; Other findings: abnormal ekg.  This EKG was interpreted by Viki Almanza Ingrid Toussaint MD,ED Provider. SOL[facility]       Perfect Serve Consult for Admission  3:25 PM    ED Room Number: FY59/90  Patient Name and age: Duane Crane 66 y.o.  female  Working Diagnosis:   1. Syncope, unspecified syncope type    2. Neck pain        COVID-19 Suspicion:  no  Sepsis present:  no  Reassessment needed: N/A  Code Status:  Full Code  Readmission: no  Isolation Requirements:  no  Recommended Level of Care:  telemetry  Department:[facility] Adult ED - 21   Other:  Patient experienced episode of syncope without known precipitating event. Was placing candle on window sill and as she turned to the left to obtain new bulb became unresponsive and fell over the arm of the sofa. She was unresponsive for about 15 seconds. Had also been having tingling pain in her left posterior neck for several days. CT head with no acute findings and intracranial atherosclerosis and CTA head and neck prelim read with no large vessel occlusion.    Procedures

## 2021-11-27 NOTE — ROUTINE PROCESS
TRANSFER - OUT REPORT:    Verbal report given to [facility] (name) on Marko Lights  being transferred to  (unit) for routine progression of care       Report consisted of patients Situation, Background, Assessment and   Recommendations(SBAR). Information from the following report(s) SBAR, Kardex, ED Summary, Intake/Output, MAR and Recent Results was reviewed with the receiving nurse. Lines:   Peripheral IV 11/27/21 Right Antecubital (Active)   Site Assessment Clean, dry, & intact 11/27/21 1339   Phlebitis Assessment 0 11/27/21 1339   Infiltration Assessment 0 11/27/21 1339   Dressing Status Clean, dry, & intact 11/27/21 1339   Dressing Type Transparent 11/27/21 1339   Hub Color/Line Status Pink; Patent 11/27/21 1339   Action Taken Blood drawn 11/27/21 1339        Opportunity for questions and clarification was provided.       Patient transported with:   [email or web address]

## 2021-11-27 NOTE — TELEPHONE ENCOUNTER
Received call from Allison Merchant at [facility] with Red Flag Complaint. Brief description of triage: passed out yesterday, fell on the sofa, was very shaky and weak, /85, feels funny with quick head movement    Triage indicates for patient to the ED    Care advice provided, patient verbalizes understanding; denies any other questions or concerns; instructed to call back for any new or worsening symptoms. Attention Provider: Thank you for allowing me to participate in the care of your patient. The patient was connected to triage in response to information provided to the ECC. Please do not respond through this encounter as the response is not directed to a shared pool. Reason for Disposition   [1] Age > 50 years  AND [2] now alert and feels fine    Answer Assessment - Initial Assessment Questions  1. ONSET: \"How long were you unconscious? \" (minutes) \"When did it happen?\"      1 minute, yesterday    2. CONTENT: \"What happened during period of unconsciousness? \" (e.g., seizure activity)       Does not know     3. MENTAL STATUS: \"Alert and oriented now? \" (oriented x 3 = name, month, location)       Alert and oriented, light headed today     4. TRIGGER: \"What do you think caused the fainting? \" \"What were you doing just before you fainted? \"  (e.g., exercise, sudden standing up, prolonged standing)      Does not know, her head is feeling full today     5. RECURRENT SYMPTOM: \"Have you ever passed out before? \" If so, ask: \"When was the last time? \" and \"What happened that time? \"       Denies    6. INJURY: \"Did you sustain any injury during the fall? \"       Denies     7. CARDIAC SYMPTOMS: \"Have you had any of the following symptoms: chest pain, difficulty breathing, palpitations? \"      Denies     8. NEUROLOGIC SYMPTOMS: \"Have you had any of the following symptoms: headache, numbness, vertigo, weakness? \"      Tingling on left side of neck yesterday, quick head movements feels funny, weakness     9.  GI SYMPTOMS: \"Have you had any of the following symptoms: abdominal pain, vomiting, diarrhea, blood in stools? \"      Denies    10. OTHER SYMPTOMS: \"Do you have any other symptoms? \"        Denies    11. PREGNANCY: \"Is there any chance you are pregnant? \" \"When was your last menstrual period? \"        N/a    Protocols used: [facility]

## 2021-11-28 ENCOUNTER — APPOINTMENT (OUTPATIENT)
Dept: VASCULAR SURGERY | Age: 71
End: 2021-11-28
Attending: HOSPITALIST
Payer: MEDICARE

## 2021-11-28 ENCOUNTER — APPOINTMENT (OUTPATIENT)
Dept: MRI IMAGING | Age: 71
End: 2021-11-28
Attending: HOSPITALIST
Payer: MEDICARE

## 2021-11-28 ENCOUNTER — APPOINTMENT (OUTPATIENT)
Dept: NON INVASIVE DIAGNOSTICS | Age: 71
End: 2021-11-28
Attending: HOSPITALIST
Payer: MEDICARE

## 2021-11-28 VITALS
SYSTOLIC BLOOD PRESSURE: 149 MMHG | RESPIRATION RATE: 16 BRPM | HEART RATE: 64 BPM | TEMPERATURE: 98.2 F | HEIGHT: 64 IN | BODY MASS INDEX: 25.97 KG/M2 | DIASTOLIC BLOOD PRESSURE: 69 MMHG | WEIGHT: 152.12 LBS | OXYGEN SATURATION: 96 %

## 2021-11-28 LAB
ANION GAP SERPL CALC-SCNC: 7 MMOL/L (ref 5–15)
ATRIAL RATE: 79 BPM
BUN SERPL-MCNC: 18 MG/DL (ref 6–20)
BUN/CREAT SERPL: 29 (ref 12–20)
CALCIUM SERPL-MCNC: 9.2 MG/DL (ref 8.5–10.1)
CALCULATED R AXIS, ECG10: 6 DEGREES
CALCULATED T AXIS, ECG11: -3 DEGREES
CHLORIDE SERPL-SCNC: 106 MMOL/L (ref 97–108)
CHOLEST SERPL-MCNC: 212 MG/DL
CO2 SERPL-SCNC: 25 MMOL/L (ref 21–32)
CREAT SERPL-MCNC: 0.63 MG/DL (ref 0.55–1.02)
DIAGNOSIS, 93000: NORMAL
ECHO AO ROOT DIAM: 3 CM
ECHO AV PEAK GRADIENT: 8.13 MMHG
ECHO AV PEAK VELOCITY: 142.52 CM/S
ECHO EST RA PRESSURE: 3 MMHG
ECHO LA MAJOR AXIS: 3.71 CM
ECHO LA MINOR AXIS: 2.13 CM
ECHO LV E' LATERAL VELOCITY: 11.73 CM/S
ECHO LV E' SEPTAL VELOCITY: 7.82 CM/S
ECHO LV INTERNAL DIMENSION DIASTOLIC: 4.01 CM (ref 3.9–5.3)
ECHO LV INTERNAL DIMENSION SYSTOLIC: 2.48 CM
ECHO LV IVSD: 0.9 CM (ref 0.6–0.9)
ECHO LV MASS 2D: 103.5 G (ref 67–162)
ECHO LV MASS INDEX 2D: 59.5 G/M2 (ref 43–95)
ECHO LV POSTERIOR WALL DIASTOLIC: 0.82 CM (ref 0.6–0.9)
ECHO LVOT PEAK GRADIENT: 4.08 MMHG
ECHO LVOT PEAK VELOCITY: 101 CM/S
ECHO MV A VELOCITY: 88.12 CM/S
ECHO MV AREA PHT: 3.55 CM2
ECHO MV E DECELERATION TIME (DT): 213.82 MS
ECHO MV E VELOCITY: 104.24 CM/S
ECHO MV E/A RATIO: 1.18
ECHO MV E/E' LATERAL: 8.89
ECHO MV E/E' RATIO (AVERAGED): 11.11
ECHO MV E/E' SEPTAL: 13.33
ECHO MV PRESSURE HALF TIME (PHT): 62.01 MS
ECHO PV MAX VELOCITY: 117.15 CM/S
ECHO PV PEAK INSTANTANEOUS GRADIENT SYSTOLIC: 5.49 MMHG
ECHO RIGHT VENTRICULAR SYSTOLIC PRESSURE (RVSP): 21.43 MMHG
ECHO RV INTERNAL DIMENSION: 3.08 CM
ECHO RV TAPSE: 1.65 CM (ref 1.5–2)
ECHO TV REGURGITANT MAX VELOCITY: 214.67 CM/S
ECHO TV REGURGITANT PEAK GRADIENT: 18.43 MMHG
ERYTHROCYTE [DISTWIDTH] IN BLOOD BY AUTOMATED COUNT: 12 % (ref 11.5–14.5)
GLUCOSE SERPL-MCNC: 107 MG/DL (ref 65–100)
HCT VFR BLD AUTO: 40.5 % (ref 35–47)
HDLC SERPL-MCNC: 82 MG/DL
HDLC SERPL: 2.6 {RATIO} (ref 0–5)
HGB BLD-MCNC: 13.3 G/DL (ref 11.5–16)
LDLC SERPL CALC-MCNC: 102.6 MG/DL (ref 0–100)
MCH RBC QN AUTO: 32.4 PG (ref 26–34)
MCHC RBC AUTO-ENTMCNC: 32.8 G/DL (ref 30–36.5)
MCV RBC AUTO: 98.5 FL (ref 80–99)
NRBC # BLD: 0 K/UL (ref 0–0.01)
NRBC BLD-RTO: 0 PER 100 WBC
P-R INTERVAL, ECG05: 138 MS
PLATELET # BLD AUTO: 302 K/UL (ref 150–400)
PMV BLD AUTO: 10 FL (ref 8.9–12.9)
POTASSIUM SERPL-SCNC: 3.8 MMOL/L (ref 3.5–5.1)
Q-T INTERVAL, ECG07: 400 MS
QRS DURATION, ECG06: 80 MS
QTC CALCULATION (BEZET), ECG08: 458 MS
RBC # BLD AUTO: 4.11 M/UL (ref 3.8–5.2)
SODIUM SERPL-SCNC: 138 MMOL/L (ref 136–145)
TRIGL SERPL-MCNC: 137 MG/DL (ref ?–150)
TROPONIN-HIGH SENSITIVITY: 8 NG/L (ref 0–51)
VENTRICULAR RATE, ECG03: 79 BPM
VLDLC SERPL CALC-MCNC: 27.4 MG/DL
WBC # BLD AUTO: 8.4 K/UL (ref 3.6–11)

## 2021-11-28 PROCEDURE — 93306 TTE W/DOPPLER COMPLETE: CPT

## 2021-11-28 PROCEDURE — 80061 LIPID PANEL: CPT

## 2021-11-28 PROCEDURE — 74011250637 HC RX REV CODE- 250/637: Performed by: HOSPITALIST

## 2021-11-28 PROCEDURE — 94760 N-INVAS EAR/PLS OXIMETRY 1: CPT

## 2021-11-28 PROCEDURE — 85027 COMPLETE CBC AUTOMATED: CPT

## 2021-11-28 PROCEDURE — G0378 HOSPITAL OBSERVATION PER HR: HCPCS

## 2021-11-28 PROCEDURE — 36415 COLL VENOUS BLD VENIPUNCTURE: CPT

## 2021-11-28 PROCEDURE — 80048 BASIC METABOLIC PNL TOTAL CA: CPT

## 2021-11-28 PROCEDURE — 93880 EXTRACRANIAL BILAT STUDY: CPT

## 2021-11-28 PROCEDURE — 70551 MRI BRAIN STEM W/O DYE: CPT

## 2021-11-28 PROCEDURE — 99204 OFFICE O/P NEW MOD 45 MIN: CPT | Performed by: PSYCHIATRY & NEUROLOGY

## 2021-11-28 PROCEDURE — 84484 ASSAY OF TROPONIN QUANT: CPT

## 2021-11-28 RX ORDER — DOXYCYCLINE HYCLATE 100 MG
100 TABLET ORAL 2 TIMES DAILY
Status: DISCONTINUED | OUTPATIENT
Start: 2021-11-28 | End: 2021-11-28 | Stop reason: HOSPADM

## 2021-11-28 RX ADMIN — DOXYCYCLINE HYCLATE 100 MG: 100 TABLET, COATED ORAL at 11:00

## 2021-11-28 RX ADMIN — LEVOTHYROXINE SODIUM 100 MCG: 0.1 TABLET ORAL at 10:48

## 2021-11-28 RX ADMIN — LISINOPRIL 10 MG: 10 TABLET ORAL at 10:48

## 2021-11-28 RX ADMIN — FLUOXETINE 20 MG: 20 CAPSULE ORAL at 10:48

## 2021-11-28 NOTE — PROGRESS NOTES
MRI refused to do STAT MRI, requesting that the doctor call them himself because they \"don't want to call in someone unless it's worth it. \" RN notified them that patient was in critical condition after Rapid Response was called and needs a STAT MRI. MRI then hung up the phone, saying \"I don't want to talk to you. \" MD notified. MD called MRI, but no one was there. MD states that MRI will now be done early in the morning.

## 2021-11-28 NOTE — PROGRESS NOTES
Admission to [facility]     11/27/21 2020   Vital Signs   Temp 97.7 °F (36.5 °C)   Temp Source Oral   Pulse (Heart Rate) 76   Heart Rate Source Monitor   Cardiac Rhythm Sinus Rhythm   Resp Rate 17   Level of Consciousness Alert (0)   BP (!) 140/60   MAP (Calculated) 87   BP 1 Method Automatic   BP 1 Location Left upper arm   BP Patient Position Sitting   MEWS Score 1   Electrodes Replaced No   Pain 1   Pain Scale 1 Numeric (0 - 10)   Pain Intensity 1 0   Patient Stated Pain Goal 0

## 2021-11-28 NOTE — PROGRESS NOTES
Rapid response was called  Patient felt dizzy and had tingling sensation on both arms which has subsided now  without any limb weakness was feeling SOB but not hypoxic . EKG no acute changes. MRI STAT ordered. Her Vitals are stable. CTA also ordered as PE can some times present as syncope. C spine CT also ordered as she has bilateral arm numbness.

## 2021-11-28 NOTE — DISCHARGE SUMMARY
Discharge Summary       PATIENT ID: Yareli Ledbetter  MRN: [id number]   YOB: 1950    DATE OF ADMISSION: 11/27/2021  1:29 PM    DATE OF DISCHARGE: 11/28/2021   PRIMARY CARE PROVIDER: Dinorah Mendoza MD     ATTENDING PHYSICIAN: Darius Batres MD  DISCHARGING PROVIDER: Darius Batres MD    To contact this individual call [phone] and ask the  to page. If unavailable ask to be transferred the Adult Hospitalist Department. CONSULTATIONS: IP CONSULT TO HOSPITALIST  IP CONSULT TO NEUROLOGY    PROCEDURES/SURGERIES: * No surgery found *    ADMITTING [address] [place] [address] COURSE:   Evaluated and treated for faint, brief syncope. Likely vasovagal episode. Risk of Re-Admission: low  DISCHARGE DIAGNOSES / PLAN:      #. Syncope: unknown etiology ?vasovagal  - tele, orthostatic vitals, TTE results pending,   - MRI brain: no acute, old changes. carotid duplex: <50% stenosis  - CT C-spine: some degenerative changes only. - CTA chest: no PEs, NAD. trops -ve. EKG: sinus. BP little elevated, needs monitoring and keep a log to f/u with PCP. - patient's orthostatic vitals stable, keen on dc- staff with call her with results of echo once available.  F/u op with PCP and cardiology if recurrent episodes happen.     #. Hypothyroidism: home dose synthroid, TSH 2.5     FOLLOW UP APPOINTMENTS:    Follow-up Information     Follow up With Specialties Details Why Contact Info    Dinorah Mendoza MD Internal Medicine   [facility]  [address]      Dinorah Mendoza MD Internal Medicine In 1 week  [facility]  [address] [place] [address]           ADDITIONAL CARE RECOMMENDATIONS:  Follow up with PCP    DIET: Resume previous diet    ACTIVITY: Activity as tolerated    DISCHARGE MEDICATIONS:  Current Discharge Medication List      CONTINUE these medications which have NOT CHANGED    Details   simvastatin (ZOCOR) 40 mg tablet TAKE 1 TABLET NIGHTLY  Qty: 90 Tablet, Refills: 1 lifitegrast (Xiidra) 5 % dpet Administer 1 Drop to both eyes two (2) times a day. Synthroid 100 mcg tablet Take 1 Tablet by mouth Daily (before breakfast). Qty: 90 Tablet, Refills: 1      doxycycline (VIBRA-TABS) 100 mg tablet Take 100 mg by mouth two (2) times a day. FLUoxetine (PROzac) 20 mg capsule TAKE 1 CAPSULE DAILY  Qty: 90 Capsule, Refills: 1      calcium carbonate (TUMS) 200 mg calcium (500 mg) chew Take 1 Tab by mouth daily. As needed      aspirin delayed-release 81 mg tablet Take  by mouth daily. coenzyme q10 300 mg cap Take 300 mg by mouth daily. LACTOBACILLUS ACIDOPHILUS (PROBIOTIC PO) Take 1 Cap by mouth daily. BOSWELLIA SURENDRA EXTRACT (BOSWELLIA SURENDRA XT, BULK,) Take  by mouth daily. Anti-inflammatory supplement. Takes one cap po daily. MULTIVITAMIN PO Take  by mouth daily. Takes one po daily. NOTIFY YOUR PHYSICIAN FOR ANY OF THE FOLLOWING:   Fever over 101 degrees for 24 hours. Chest pain, shortness of breath, fever, chills, nausea, vomiting, diarrhea, change in mentation, falling, weakness, bleeding. Severe pain or pain not relieved by medications. Or, any other signs or symptoms that you may have questions about. DISPOSITION:    Home With:   OT  PT  HH  RN       Long term SNF/Inpatient Rehab   x Independent/assisted living    Hospice    Other:     PATIENT CONDITION AT DISCHARGE:     Functional status    Poor     Deconditioned     Independent      Cognition     Lucid     Forgetful     Dementia      Catheters/lines (plus indication)    Villaseñor     PICC     PEG     None      Code status     Full code     DNR      PHYSICAL EXAMINATION AT DISCHARGE:  Patient seen and examined at bedside, Condition stable, explained discharge and follow up plans.   BP (!) 134/57 (BP Patient Position: Standing)   Pulse 64   Temp 98.2 °F (36.8 °C)   Resp 14   Ht 5' 4\" (1.626 m)   Wt 69 kg (152 lb 1.9 oz)   SpO2 98%   BMI 26.11 kg/m²   General:  Alert, oriented, No acute distress. Elderly pleasant [employer]  Cardio: regulra rate and rhythm on monitor. S1, s2, no murmurs. Resp:  No accessory muscle use, Good AE. Neuro:  Grossly normal, no focal neuro deficits, follows commands     CHRONIC MEDICAL DIAGNOSES:  Problem List as of 11/28/2021 Date Reviewed: 10/26/2021          Codes Class Noted - Resolved    Syncope ICD-10-CM: R55  ICD-9-CM: 780.2  11/27/2021 - Present        Endometrial polyp ICD-10-CM: N84.0  ICD-9-CM: 621.0  3/8/2019 - Present        Endocervical polyp ICD-10-CM: N84.1  ICD-9-CM: 622.7  3/8/2019 - Present        Mass of right ovary ICD-10-CM: N83.8  ICD-9-CM: 620.8  1/22/2019 - Present        Thickened endometrium ICD-10-CM: R93.89  ICD-9-CM: 793.5  1/22/2019 - Present        Advance directive discussed with patient ICD-10-CM: Z71.89  ICD-9-CM: V65.49  3/13/2018 - Present    Overview Signed 3/13/2018 11:13 AM by Antonieta Rizo MD     3/13/2018 - -patient has an established AD. Will bring copy in for chart. H/O bone density study (Chronic) ICD-10-CM: Z92.89  ICD-9-CM: V15.89  9/9/2016 - Present    Overview Addendum 9/11/2019  1:09 PM by Antonieta Rizo MD     [facility] [place],9/4/19- osteopenia. Followed by Dr. 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 Kristi Burrell. Follow up in 5 years.               History of screening mammography (Chronic) ICD-10-CM: Z92.89  ICD-9-CM: V15.89 7/10/2012 - Present    Overview Addendum 3/26/2021  4:02 PM by Marta Hernandez MD     [facility], 11/2/20             Pap smear for cervical cancer screening (Chronic) ICD-10-CM: Z12.4  ICD-9-CM: V76.2  7/10/2012 - Present    Overview Addendum 3/10/2017 11:02 AM by Marta Hernandez MD     6/16, [facility]             Hypothyroid ICD-10-CM: E03.9  ICD-9-CM: 244.9  12/7/2011 - Present        Depression ICD-10-CM: F32. A  ICD-9-CM: 097  12/7/2011 - Present        HTN (hypertension) ICD-10-CM: I10  ICD-9-CM: 401.9  12/7/2011 - Present        Fibrocystic breast ICD-10-CM: N60.19  ICD-9-CM: 610.1  12/7/2011 - Present        S/p tibial fracture ICD-10-CM: Z87.81  ICD-9-CM: V54.16  12/7/2011 - Present    Overview Signed 12/7/2011  2:17 PM by Marta Hernandez MD     Right with venkatesh placement, 1995             S/P hemorrhoidectomy ICD-10-CM: Z03.774, Z87.19  ICD-9-CM: V45.89  12/7/2011 - Present    Overview Signed 12/7/2011  2:18 PM by MD Dr. Candice Calderon             Environmental allergies ICD-10-CM: Z91.09  ICD-9-CM: V15.09  12/7/2011 - Present        S/P laparoscopic cholecystectomy ICD-10-CM: Z90.49  ICD-9-CM: V45.89  12/7/2011 - Present        Hiatal hernia ICD-10-CM: K44.9  ICD-9-CM: 553.3  12/7/2011 - Present    Overview Signed 12/7/2011  2:18 PM by Marta Hernandez MD     egd 11/9/10, Dr. Celestina Welch                   37 minutes were spent with the patient on counseling and coordination of care.     Signed:   Harpal Gordon MD  11/28/2021  4:30 PM

## 2021-11-28 NOTE — PROGRESS NOTES
Called report to 6S but 6S Nurse refusing to take report because labs were not drawn. Patient now in CT heading straight to 6S. Nursing supervisor called, and was told OK to give report.

## 2021-11-28 NOTE — CONSULTS
[address]    Name:  Ayala Elkins  MR#:  [id number]  :  1950  ACCOUNT #:  [de-identified]  DATE OF SERVICE:  2021      REQUESTING PHYSICIAN:  Dr. Jorge Garcia. REASON FOR EVALUATION:  Syncope. HISTORY OF PRESENT ILLNESS:  The patient is a 75-year-old female with history of hyperlipidemia, hypothyroidism, ocular cicatricial pemphigoid, and depression. She was setting up [place] lights yesterday and had just placed a light in the window. The bulb had gone out and she turned to get another light and the next thing she knows is she had collapsed on to the sofa and over its arm. She was out for about 10-15 seconds and then she came to, was helped by her . She felt weak, drained, and shaky, and eventually it cleared up. She was admitted to the hospital and was noted to be hypertensive which is new for her. She did receive a dose of hydralazine apparently which made her hypotensive. She felt dizzy and had tingling sensation in arms which subsided on its own. The EKG was unremarkable at the time. Today, she has felt fairly back to her baseline, but her blood pressure has been fluctuating. Denies any headache, changes in vision, focal motor or sensory weakness, nausea, or vomiting. PAST MEDICAL HISTORY:  As mentioned above. SOCIAL HISTORY:  Lives with her . She is completely retired. No smoking. May drink two alcoholic drinks per day. HOME MEDICATIONS:  1. Prozac. 2.  Synthroid. 3.  Zocor. 4.  Aspirin. 5.  Doxycycline. REVIEW OF SYSTEMS:  Negative except as mentioned in the HPI. FAMILY HISTORY:  Noncontributory. PHYSICAL EXAMINATION:  GENERAL:  The patient is alert, fully oriented. VITAL SIGNS:  Blood pressure 134/57, temperature 98.2, pulse is 63. Orthostatic vital signs show blood pressure 149/64 with pulse of 92 lying down, 153/59 with pulse of 90 in sitting position, and 134/57 with a pulse of 83 upon standing up.   HEART: Regular rate and rhythm. CHEST:  Clear. ABDOMEN:  Soft, nontender. Positive bowel sounds. EXTREMITIES:  No edema. NEUROLOGIC:  Her speech is clear. Comprehension is normal.  Pupils are equal, round, reactive. Extraocular movements are full. Face is symmetric. Tongue is midline. Hearing is baseline. Muscle tone and bulk normal.  Strength normal in both upper and lower extremities. DTRs 2/2 and symmetric. Toes downgoing. Sensation is intact. Gait is baseline. LABORATORY DATA:  CBC is normal.  Chemistry unremarkable. Lipid Profile:  Triglycerides 137, HDL 82, . 6. CTA of the head and neck does not show any hemodynamically significant stenosis. MRI of the brain does not show any acute infarct. There is mild chronic microvascular ischemic change. Carotid duplex is unremarkable. Echocardiogram results pending. ASSESSMENT AND PLAN:  A 78-year-old female admitted with an abrupt onset of syncope. The exact cause is unclear, but suspect due to drop/fluctuation in blood pressure. Her neurological workup is negative. Her orthostatic vitals do show fluctuation with a nearly 20 point drop in systolic blood pressure from sitting to standing up. Recommend doing ANS testing/tilt-table test as an outpatient. We will defer to primary team if additional cardiac monitoring is needed and regarding treatment for her blood pressure. Okay to discharge from a neurological standpoint. I discussed the importance of adequate hydration and opting a regular exercise regimen. Please call with any further questions. Thank you for this consultation.       Nieves Suero MD      [id number]  D:  11/28/2021 14:27  T:  11/28/2021 14:52  JOB #:  [id number]

## 2021-11-28 NOTE — CONSULTS
Consult dictated. 64-year-old admitted with abrupt syncope. Work-up is negative. Orthostatic vitals do show a fluctuation with nearly 20 point drop in systolic BP from sitting to standing up. Recommend tilt table test/ANS testing as outpatient. Okay to discharge from neurological standpoint. Discussed the importance of adequate hydration and adopting a regular exercise regimen.   Denis Simon MD

## 2021-11-28 NOTE — PROGRESS NOTES
TRANSFER - OUT REPORT:    Verbal report given to 6S Nurse(name) on Anabel Sacks  being transferred to 6S(unit) for change in patient condition(dizziness, SOB, tingling)       Report consisted of patients Situation, Background, Assessment and   Recommendations(SBAR). Information from the following report(s) SBAR, Kardex, ED Summary, Intake/Output, MAR and Recent Results was reviewed with the receiving nurse. Lines:   Peripheral IV 11/27/21 Right Antecubital (Active)   Site Assessment Clean, dry, & intact 11/27/21 1339   Phlebitis Assessment 0 11/27/21 1339   Infiltration Assessment 0 11/27/21 1339   Dressing Status Clean, dry, & intact 11/27/21 1339   Dressing Type Transparent 11/27/21 1339   Hub Color/Line Status Pink; Patent 11/27/21 1339   Action Taken Blood drawn 11/27/21 1339        Opportunity for questions and clarification was provided.       Patient transported with:   Monitor

## 2021-11-28 NOTE — PROGRESS NOTES
Bedside and Verbal shift change report given to [address] LPN (oncoming nurse) by Jonna Ceja RN (offgoing nurse). Report included the following information SBAR, Kardex, ED Summary, Intake/Output, MAR, Accordion and Recent Results.

## 2021-11-28 NOTE — H&P
[address]  HISTORY AND PHYSICAL    Name:  Montserrat Rodriguez  MR#:  [id number]  :  1950  ACCOUNT #:  [de-identified]  ADMIT DATE:  2021      PRIMARY CARE PHYSICIAN:  Yoan Ojeda MD.    PRESENTING COMPLAINT:  Syncope. HISTORY OF PRESENT ILLNESS:      The patient is a 22-year-old female who has previous history significant for ocular cicatricial pemphigoid, hyperlipidemia, hypothyroidism, and depression, presented to the emergency room after having a syncopal episode yesterday. According to the patient, she was placing candles on the window sill, and as she turned to the left to obtain new bulb, she became unresponsive and fell over the arm of the sofa. She was unresponsive for about 15 seconds. The patient did not have any seizure-like activity. Her  says the only symptom she had after this event was weakness. She had no chest pain, no shortness of breath. Today, she spoke with her primary care physician who told her to come to the emergency room. In the emergency room, the patient's head CT was unremarkable. However, her blood pressure was on the higher side. The patient tells me that yesterday her blood pressure after the event was 176/70. She is currently on no blood pressure medications. She denies having any other symptoms. She is fully COVID vaccinated. She has no limb weakness or headache. PAST MEDICAL HISTORY:    1. Depression. 2.  Hypothyroidism. 3.  History of cataract. 4.  History of endometrial polyp. 5.  History of pemphigoid ocular. SOCIOECONOMIC HISTORY:  The patient does not smoke. She drinks two drinks a day. She is retired, lives with her . CURRENT MEDICATIONS:  Include Prozac 20 mg daily, Synthroid 100 mcg daily, Zocor 40 mg daily, aspirin 81 mg daily, and doxycycline 100 mg. REVIEW OF SYSTEMS:  Negative except as mentioned in history of present illness. All system were reviewed.   No other positive finding was noticed. FAMILY HISTORY:  Significant for coronary artery disease in multiple family members. PHYSICAL EXAMINATION:  GENERAL:  The patient is a 43-year-old female not in any acute distress. VITAL SIGNS:  Temperature 97.8, blood pressure is 167/72, pulse 68, respiratory rate is 15, saturating 98%. HEENT:  Examination reveals pupils equally reactive to light and accommodation. NECK:  Supple. There is no adenopathy or JVD. CHEST:  Clear. No wheezing or crackles. CARDIOVASCULAR SYSTEM:  S1 and S2 regular. No murmur. No S3.  ABDOMEN:  Examination reveals no tenderness, no guarding or rigidity. Bowel sounds are active. EXTREMITIES:  No pedal edema. CNS:  Examination reveals the patient is alert, oriented, has normal strength and normal reflexes. Plantar is downgoing. Cranial nerves normal.  Gait is stable. LABORATORY DATA:  Labs done in ER revealed white count of 6.3, hemoglobin of 14.8, hematocrit 43.7, platelet count 167,142. Chemistries, sodium 139, potassium 4.1, chloride is 105, bicarb is 30, gap of 4, glucose 87, BUN is 18, creatinine 0.93, calcium 9.7, bilirubin 0.5, protein 7.8, albumin is 4.2, globulin is 3.6, ALT is 41, AST is 24, alk phos is 114. Troponin high sensitivity is 5. CTA of the head and neck with contrast preliminary no large vessel occlusion. CT head shows no acute finding. EKG shows normal sinus rhythm with some left ventricular hypertrophy. Nonspecific ST-T segment changes. QT interval corrected is 480 milliseconds. ASSESSMENT AND PLAN:      The patient is a 43-year-old female who has previous history significant for hypothyroidism, depression, hyperlipidemia, is admitted with,    1. Syncope. Exact etiology is not clear ? Vasovagal.  We will check orthostatic, however, the patient's blood pressure has been elevated in ER. I will start her on low dose of antihypertensive medications and monitor closely.   We will also get an echocardiogram.  It seems like the patient had normal stress test in the past and also had a normal stress echo in 2019. Serial Enzymes are ordered. MRI and carotid doppler ordered as well. 2.  The patient complains of some tingling of her legs. She also had some tingling of her neck . At this time, tingling has resolved. We will consult Neurology, may need CT of the spine. 3.  Check orthostatics. 4.  Check serial cardiac enzymes. 5.  History of depression. We will continue fluoxetine. 6.  History of hypothyroidism. Continue Synthroid and check TSH. 7.  Deep venous thrombosis prophylaxis. 8. Check Lipids. Continue ASA and Statin.         Finn Quiroz MD      [id number]  D:  11/27/2021 16:32  T:  11/27/2021 18:10  JOB #:  [id number]

## 2021-11-28 NOTE — DISCHARGE INSTRUCTIONS
Discharge Instructions       PATIENT ID: Ted Brandon  MRN: [id number]   YOB: 1950    DATE OF ADMISSION: 11/27/2021  1:29 PM    DATE OF DISCHARGE: 11/28/2021    PRIMARY CARE PROVIDER: Cj Medina MD     ATTENDING PHYSICIAN: Pamela Belcher MD  DISCHARGING PROVIDER: Sudha Beltran MD    To contact this individual call [phone] and ask the  to page. If unavailable ask to be transferred the Adult Hospitalist Department. DISCHARGE DIAGNOSES syncope, likely vasovagal episode    CONSULTATIONS: IP CONSULT TO HOSPITALIST  IP CONSULT TO NEUROLOGY    PROCEDURES/SURGERIES: * No surgery found *    FOLLOW UP APPOINTMENTS:   Follow-up Information     Follow up With Specialties Details Why Contact Info    Cj Medina MD Internal Medicine   [address]  [address] [facility]  [phone]      Cj Medina MD Internal Medicine In 1 week  [address]  [address] [facility] [phone]             ADDITIONAL CARE RECOMMENDATIONS: follow up with PCP      DIET: Resume previous diet    DISCHARGE MEDICATIONS:  Monitor BP, keep a log and if elevated f/u with PCP. · It is important that you take the medication exactly as they are prescribed. · Keep your medication in the bottles provided by the pharmacist and keep a list of the medication names, dosages, and times to be taken in your wallet. · Do not take other medications without consulting your doctor. NOTIFY YOUR PHYSICIAN FOR ANY OF THE FOLLOWING:   Fever over 101 degrees for 24 hours. Chest pain, shortness of breath, fever, chills, nausea, vomiting, diarrhea, change in mentation, falling, weakness, bleeding. Severe pain or pain not relieved by medications. Or, any other signs or symptoms that you may have questions about. It was our pleasure to help take care of you and we hope you get well very soon.     DISPOSITION:    Home With:   OT  PT  [place]  RN       SNF/Inpatient Rehab/LTAC   x Independent/assisted living    Hospice    Other:     CDMP Checked:   Yes x     PROBLEM LIST Updated:  Yes x     Signed:   Claudeen Jack, MD  11/28/2021  4:36 PM

## 2021-11-28 NOTE — PROGRESS NOTES
11/28/21 1053 11/28/21 1058 11/28/21 1059   Vitals   Pulse (Heart Rate) 61 66 64   Resp Rate 13 21 14   O2 Sat (%) 98 % 98 % 98 %   BP (!) 149/64 (!) 153/59 (!) 134/57   MAP (Monitor) 89 86 79   MAP (Calculated) 92 90 83   BP Patient Position Lying Sitting Standing   Oxygen Therapy   Pulse via Oximetry 61 beats per minute 63 beats per minute 62 beats per minute       Orthostatic B/P monitoring

## 2021-11-28 NOTE — PROGRESS NOTES
Observation notice provided in writing to patient and/or caregiver as well as verbal explanation of the policy. Patients who are in outpatient status also receive the Observation notice. Patient has received notice and or patient representative has received via secure email, fax, or certified mail based on patient representative's preference.    ROE Alejo, ACM

## 2021-11-28 NOTE — PROGRESS NOTES
TRANSFER - IN REPORT:    Verbal report received from [facility] (name) on Marcelina Terrell  being received from 5W (unit) for routine progression of care      Report consisted of patients Situation, Background, Assessment and   Recommendations(SBAR). Information from the following report(s) SBAR, Kardex, ED Summary, Intake/Output, MAR, Accordion and Recent Results was reviewed with the receiving nurse. Opportunity for questions and clarification was provided. Assessment completed upon patients arrival to unit and care assumed.

## 2021-11-28 NOTE — PROGRESS NOTES
Rapid response called on patient on another floor after admission from the ED. Nurse attempted to call report on this patient without STAT imaging and labs drawn. Per protocol, labs and STAT imaging is done during a rapid response on the floor that the RR is called, by the RN who called it before transferring to another floor, so direct and specific care may be completed when their care is escalated (in this case, to [facility]). When asked why the RN did not complete the labs or acknowledge the orders before the patient was transferred, he said that the pt was hypotensive and I asked what her BP was and he reported \"517 systolic. \"     I asked where the pt was and the RN said she was in CT already and will not be returning to their floor. Nursing supervisors came to our floor to speak with me and I expressed my concerns for which they said \"in an ideal case those things would be done but she is already in CT and the doctor ordered emergent escalation of care. \"    Before receiving report, the patient arrived to our floor around 5 minutes after speaking with the supervisors. Upon receiving report, the RN said that the pt was \"hypertensive (220/110) in the ED\" but that it \"was not charted. \" He then told me that she was given hydralazine which made her hypotensive. I asked what her latest vital signs were and he reported, again, \"193 systolic. \" I asked if she had any lower pressures and he said this is the most hypotensive she has ever been. Confused, I educated the RN and said that that was not hypotensive, but rather a hypotensive episode related to medication administration. He then said \"well, she was unresponsive when she came to our floor. \" I pressed further because that is way different than noted in her chart and according to the rest of report and then he corrected himself and said \"she lost consciousness briefly after the medication was given, but that is the same thing. \"     I do not believe an escalation of care was necessary and a Safe Care has been completed on this case.

## 2021-11-29 LAB
LEFT CCA DIST DIAS: 18.1 CM/S
LEFT CCA DIST SYS: 80.2 CM/S
LEFT CCA PROX DIAS: 14.4 CM/S
LEFT CCA PROX SYS: 98.4 CM/S
LEFT ECA DIAS: 12.7 CM/S
LEFT ECA SYS: 168.2 CM/S
LEFT ICA DIST DIAS: 6.3 CM/S
LEFT ICA DIST SYS: 25.2 CM/S
LEFT ICA MID DIAS: 13 CM/S
LEFT ICA MID SYS: 76.9 CM/S
LEFT ICA PROX DIAS: 12.2 CM/S
LEFT ICA PROX SYS: 62.5 CM/S
LEFT ICA/CCA SYS: 1
LEFT VERTEBRAL DIAS: 9.5 CM/S
LEFT VERTEBRAL SYS: 40.2 CM/S
RIGHT CCA DIST DIAS: 18.1 CM/S
RIGHT CCA DIST SYS: 94.8 CM/S
RIGHT CCA PROX DIAS: 27.2 CM/S
RIGHT CCA PROX SYS: 109.4 CM/S
RIGHT ECA DIAS: 8.9 CM/S
RIGHT ECA SYS: 118.5 CM/S
RIGHT ICA DIST DIAS: 14.7 CM/S
RIGHT ICA DIST SYS: 62.1 CM/S
RIGHT ICA MID DIAS: 20.8 CM/S
RIGHT ICA MID SYS: 68.7 CM/S
RIGHT ICA PROX DIAS: 19.5 CM/S
RIGHT ICA PROX SYS: 71.1 CM/S
RIGHT ICA/CCA SYS: 0.8
RIGHT VERTEBRAL DIAS: 10.9 CM/S
RIGHT VERTEBRAL SYS: 57.6 CM/S

## 2021-11-30 ENCOUNTER — TELEPHONE (OUTPATIENT)
Dept: CARDIOLOGY CLINIC | Age: 71
End: 2021-11-30

## 2021-11-30 ENCOUNTER — PATIENT MESSAGE (OUTPATIENT)
Dept: CARDIOLOGY CLINIC | Age: 71
End: 2021-11-30

## 2021-11-30 NOTE — TELEPHONE ENCOUNTER
Patient is calling because she is trying to find out if she was supposed to come into the office or is the loop monitor coming to her by mail. Please give a call back.       [phone]

## 2021-11-30 NOTE — TELEPHONE ENCOUNTER
----- Message from Alee Bianchi RN sent at 11/29/2021  3:45 PM EST -----  Regarding: safemail  Dr. Farhad Arteaga would like to have a 30 day event monitor mailed to patient (ASAP if possible). Dx syncope, dizziness. Thanks!   Anel Goetz

## 2021-12-02 ENCOUNTER — TELEPHONE (OUTPATIENT)
Dept: CARDIOLOGY CLINIC | Age: 71
End: 2021-12-02

## 2021-12-02 NOTE — TELEPHONE ENCOUNTER
Patient received the heart monitor through the mail and she would like to know if she can start wearing it in January, please advise          [phone]

## 2021-12-03 NOTE — TELEPHONE ENCOUNTER
Called pt. Verified patient's identity with two identifiers. Notified her okay, but she needs to call [employer] and notify them. Patient said she may decide to wear it, but just not wear it at functions. I said that is fine also. Patient verbalized understanding and denied further questions or concerns.

## 2022-01-17 ENCOUNTER — PATIENT MESSAGE (OUTPATIENT)
Dept: CARDIOLOGY CLINIC | Age: 72
End: 2022-01-17

## 2022-01-17 DIAGNOSIS — I10 ESSENTIAL HYPERTENSION: Primary | ICD-10-CM

## 2022-01-19 RX ORDER — VALSARTAN 160 MG/1
160 TABLET ORAL DAILY
Qty: 30 TABLET | Refills: 5 | Status: SHIPPED | OUTPATIENT
Start: 2022-01-19 | End: 2022-02-14 | Stop reason: SDUPTHER

## 2022-01-19 NOTE — TELEPHONE ENCOUNTER
Requested Prescriptions     Signed Prescriptions Disp Refills    valsartan (DIOVAN) 160 mg tablet 30 Tablet 5     Sig: Take 1 Tablet by mouth daily.      Authorizing Provider: Ted Melgoza     Ordering User: Alicia Mckenzie    Per Dr. Bandar Em verbal orders     See Jf giron

## 2022-02-14 ENCOUNTER — TELEPHONE (OUTPATIENT)
Dept: CARDIOLOGY CLINIC | Age: 72
End: 2022-02-14

## 2022-02-14 ENCOUNTER — OFFICE VISIT (OUTPATIENT)
Dept: CARDIOLOGY CLINIC | Age: 72
End: 2022-02-14
Payer: MEDICARE

## 2022-02-14 VITALS
SYSTOLIC BLOOD PRESSURE: 134 MMHG | HEART RATE: 67 BPM | HEIGHT: 64 IN | DIASTOLIC BLOOD PRESSURE: 84 MMHG | RESPIRATION RATE: 16 BRPM | OXYGEN SATURATION: 96 % | WEIGHT: 152 LBS | BODY MASS INDEX: 25.95 KG/M2

## 2022-02-14 DIAGNOSIS — E78.2 MIXED HYPERLIPIDEMIA: ICD-10-CM

## 2022-02-14 DIAGNOSIS — R55 SYNCOPE, UNSPECIFIED SYNCOPE TYPE: ICD-10-CM

## 2022-02-14 DIAGNOSIS — I10 PRIMARY HYPERTENSION: Primary | ICD-10-CM

## 2022-02-14 DIAGNOSIS — R93.1 AGATSTON CAC SCORE 100-199: ICD-10-CM

## 2022-02-14 DIAGNOSIS — I10 ESSENTIAL HYPERTENSION: ICD-10-CM

## 2022-02-14 PROCEDURE — G9899 SCRN MAM PERF RSLTS DOC: HCPCS | Performed by: SPECIALIST

## 2022-02-14 PROCEDURE — G8754 DIAS BP LESS 90: HCPCS | Performed by: SPECIALIST

## 2022-02-14 PROCEDURE — G9717 DOC PT DX DEP/BP F/U NT REQ: HCPCS | Performed by: SPECIALIST

## 2022-02-14 PROCEDURE — G8419 CALC BMI OUT NRM PARAM NOF/U: HCPCS | Performed by: SPECIALIST

## 2022-02-14 PROCEDURE — 3017F COLORECTAL CA SCREEN DOC REV: CPT | Performed by: SPECIALIST

## 2022-02-14 PROCEDURE — 99213 OFFICE O/P EST LOW 20 MIN: CPT | Performed by: SPECIALIST

## 2022-02-14 PROCEDURE — G8752 SYS BP LESS 140: HCPCS | Performed by: SPECIALIST

## 2022-02-14 PROCEDURE — G8427 DOCREV CUR MEDS BY ELIG CLIN: HCPCS | Performed by: SPECIALIST

## 2022-02-14 PROCEDURE — G8399 PT W/DXA RESULTS DOCUMENT: HCPCS | Performed by: SPECIALIST

## 2022-02-14 PROCEDURE — G8536 NO DOC ELDER MAL SCRN: HCPCS | Performed by: SPECIALIST

## 2022-02-14 PROCEDURE — G0463 HOSPITAL OUTPT CLINIC VISIT: HCPCS | Performed by: SPECIALIST

## 2022-02-14 PROCEDURE — 1101F PT FALLS ASSESS-DOCD LE1/YR: CPT | Performed by: SPECIALIST

## 2022-02-14 PROCEDURE — 1090F PRES/ABSN URINE INCON ASSESS: CPT | Performed by: SPECIALIST

## 2022-02-14 RX ORDER — VALSARTAN 160 MG/1
160 TABLET ORAL DAILY
Qty: 90 TABLET | Refills: 3 | Status: SHIPPED | OUTPATIENT
Start: 2022-02-14

## 2022-02-14 NOTE — PROGRESS NOTES
HISTORY OF PRESENT ILLNESS  Juancarlos Delatorre is a 66 y.o. female     SUMMARY:   Problem List  Date Reviewed: 2/14/2022          Codes Class Noted    Syncope ICD-10-CM: R55  ICD-9-CM: 780.2  11/27/2021        Endometrial polyp ICD-10-CM: N84.0  ICD-9-CM: 621.0  3/8/2019        Endocervical polyp ICD-10-CM: N84.1  ICD-9-CM: 622.7  3/8/2019        Mass of right ovary ICD-10-CM: N83.8  ICD-9-CM: 620.8  1/22/2019        Thickened endometrium ICD-10-CM: R93.89  ICD-9-CM: 793.5  1/22/2019        Advance directive discussed with patient ICD-10-CM: Z71.89  ICD-9-CM: V65.49  3/13/2018    Overview Signed 3/13/2018 11:13 AM by Cristóbal Bryan MD     3/13/2018 - -patient has an established AD. Will bring copy in for chart. H/O bone density study (Chronic) ICD-10-CM: Z92.89  ICD-9-CM: V15.89  9/9/2016    Overview Addendum 9/11/2019  1:09 PM by Cristóbal Bryan MD     [facility] [place],9/4/19- osteopenia. Followed by Dr. 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 Zhane Xiong polyps. Follow up in 5 years.               History of screening mammography (Chronic) ICD-10-CM: T15.33  ICD-9-CM: V15.89  7/10/2012    Overview Addendum 3/26/2021  4:02 PM by Cristóbal Bryan MD     [facility], 11/2/20             Pap smear for cervical cancer screening (Chronic) ICD-10-CM: Z12.4  ICD-9-CM: V76.2  7/10/2012    Overview Addendum 3/10/2017 11:02 AM by Cristóbal Bryan MD     6/16, [facility] Hypothyroid ICD-10-CM: E03.9  ICD-9-CM: 244.9  12/7/2011        Depression ICD-10-CM: F32. A  ICD-9-CM: 221  12/7/2011        HTN (hypertension) ICD-10-CM: I10  ICD-9-CM: 401.9  12/7/2011        Fibrocystic breast ICD-10-CM: N60.19  ICD-9-CM: 610.1  12/7/2011        S/p tibial fracture ICD-10-CM: Z87.81  ICD-9-CM: V54.16  12/7/2011    Overview Signed 12/7/2011  2:17 PM by Jenny Bruce MD     Right with venkatesh placement, 1995             S/P hemorrhoidectomy ICD-10-CM: H48.337, Z87.19  ICD-9-CM: V45.89  12/7/2011    Overview Signed 12/7/2011  2:18 PM by MD Dr. Corazon Cameron             Environmental allergies ICD-10-CM: Z91.09  ICD-9-CM: V15.09  12/7/2011        S/P laparoscopic cholecystectomy ICD-10-CM: C64.97  ICD-9-CM: V45.89  12/7/2011        Hiatal hernia ICD-10-CM: K44.9  ICD-9-CM: 553.3  12/7/2011    Overview Signed 12/7/2011  2:18 PM by Jenny Bruce MD     egd 11/9/10, Dr. Sarah Myles Medications on File Prior to Visit   Medication Sig    valsartan (DIOVAN) 160 mg tablet Take 1 Tablet by mouth daily.  simvastatin (ZOCOR) 40 mg tablet TAKE 1 TABLET NIGHTLY    lifitegrast (Xiidra) 5 % dpet Administer 1 Drop to both eyes two (2) times a day.  Synthroid 100 mcg tablet Take 1 Tablet by mouth Daily (before breakfast).  doxycycline (VIBRA-TABS) 100 mg tablet Take 100 mg by mouth two (2) times a day.  calcium carbonate (TUMS) 200 mg calcium (500 mg) chew Take 1 Tab by mouth daily. As needed    aspirin delayed-release 81 mg tablet Take  by mouth daily.  coenzyme q10 300 mg cap Take 300 mg by mouth daily.  LACTOBACILLUS ACIDOPHILUS (PROBIOTIC PO) Take 1 Cap by mouth daily.  BOSWELLIA SURENDRA EXTRACT (BOSWELLIA SURENDRA XT, BULK,) Take  by mouth daily. Anti-inflammatory supplement. Takes one cap po daily.  MULTIVITAMIN PO Take  by mouth daily. Takes one po daily. No current facility-administered medications on file prior to visit.        CARDIOLOGY STUDIES TO DATE:  12/11 normal stress echo at [facility] (notes reviewed and summarized under media tab)    5/16 score 188, 80th percentile  6/17 normal stress echo  9/19 normal stress echo  11/21 mild mr, otherwise normal echo  11/21 carotid dopplers less than 50% bilateral stenoses    Chief Complaint   Patient presents with    Follow-up     HPI :  She had an episode of unexplained syncope back in November and was admitted to the hospital and had an extensive work-up which I have summarized above. No further spells. Her blood pressure has started to be elevated so we put her on Diovan and she brought in a bunch of pressures from home and for the most part they are all in the range that we see here today or lower. Unfortunately she is still not exercising. Her lipid profile in November looked pretty good her LDL was 102. They are getting ready to go to [facility] for 2 weeks.   CARDIAC ROS:   negative for chest pain, dyspnea, palpitations, orthopnea, paroxysmal nocturnal dyspnea, exertional chest pressure/discomfort, claudication, lower extremity edema    Family History   Problem Relation Age of Onset    Alcohol abuse Mother         cirrhosis    Alcohol abuse Father     Other Father         cerebral hemorrhage    Cancer Brother         prostate    Heart Disease Brother 46        MI(age 46), bypass(66)    Diabetes Maternal Aunt     Hypertension Maternal Aunt     Cancer Maternal Uncle         COLON    Heart Disease Maternal Grandmother         MI    Cancer Cousin         BREAST    Anesth Problems Neg Hx        Past Medical History:   Diagnosis Date    Arthritis     lower back    Cancer ([address]     BCCAs removed - 6 on legs    Depression     Environmental allergies 12/7/2011    Fibrocystic breast 12/7/2011    GERD (gastroesophageal reflux disease)     Hiatal hernia 12/7/2011    HIGH BLOOD PRESSURE     not on meds    HTN (hypertension) 12/7/2011    Hypothyroid 12/7/2011    Nausea & vomiting     S/P hemorrhoidectomy 12/7/2011    S/P laparoscopic cholecystectomy 12/7/2011    S/p tibial fracture 12/7/2011    Tendonitis of wrist, left 12/2017    cortisone shot    Thyroid disorder        GENERAL ROS:  A comprehensive review of systems was negative except for that written in the HPI.     Visit Vitals  /84   Pulse 67   Resp 16   Ht 5' 4\" (1.626 m)   Wt 152 lb (68.9 kg)   SpO2 96%   BMI 26.09 kg/m²       Wt Readings from Last 3 Encounters:   02/14/22 152 lb (68.9 kg)   11/28/21 152 lb 1.9 oz (69 kg)   10/26/21 155 lb (70.3 kg)            BP Readings from Last 3 Encounters:   02/14/22 134/84   11/28/21 (!) 149/69   10/26/21 130/84       PHYSICAL EXAM  General appearance: alert, cooperative, no distress, appears stated age  Neurologic: Alert and oriented X 3  Neck: supple, symmetrical, trachea midline, no adenopathy, no carotid bruit and no JVD  Lungs: clear to auscultation bilaterally  Heart: regular rate and rhythm, S1, S2 normal, no murmur, click, rub or gallop  Extremities: extremities normal, atraumatic, no cyanosis or edema    Lab Results   Component Value Date/Time    Cholesterol, total 212 (H) 11/28/2021 01:43 AM    Cholesterol, total 204 (H) 09/28/2021 01:24 PM    Cholesterol, total 236 (H) 03/23/2021 10:32 AM    Cholesterol, total 229 (H) 08/07/2020 12:00 AM    Cholesterol, total 183 02/04/2020 12:28 PM    HDL Cholesterol 82 11/28/2021 01:43 AM    HDL Cholesterol 87 09/28/2021 01:24 PM    HDL Cholesterol 85 03/23/2021 10:32 AM    HDL Cholesterol 79 08/07/2020 12:00 AM    HDL Cholesterol 63 02/04/2020 12:28 PM    LDL, calculated 102.6 (H) 11/28/2021 01:43 AM    LDL, calculated 95.6 09/28/2021 01:24 PM    LDL, calculated 122.8 (H) 03/23/2021 10:32 AM    LDL, calculated 116 (H) 08/07/2020 12:00 AM    LDL, calculated 104 (H) 02/04/2020 12:28 PM    Triglyceride 137 11/28/2021 01:43 AM    Triglyceride 107 09/28/2021 01:24 PM    Triglyceride 141 03/23/2021 10:32 AM    Triglyceride 169 (H) 08/07/2020 12:00 AM Triglyceride 78 02/04/2020 12:28 PM    CHOL/HDL Ratio 2.6 11/28/2021 01:43 AM    CHOL/HDL Ratio 2.3 09/28/2021 01:24 PM    CHOL/HDL Ratio 2.8 03/23/2021 10:32 AM    CHOL/HDL Ratio 2.4 07/15/2010 11:10 AM     ASSESSMENT :      She is stable and I think asymptomatic at this point on a reasonable medical regimen and needs no further cardiac testing at this time. She will keep her usual follow-up appointment with us in April. current treatment plan is effective, no change in therapy  lab results and schedule of future lab studies reviewed with patient  reviewed diet, exercise and weight control    Encounter Diagnoses   Name Primary?  Primary hypertension Yes    Agatston CAC score 100-199     Mixed hyperlipidemia     Syncope, unspecified syncope type      No orders of the defined types were placed in this encounter. Enrique Avery MD  2/14/2022  Please note that this dictation was completed with [employer], the computer voice recognition software. Quite often unanticipated grammatical, syntax, homophones, and other interpretive errors are inadvertently transcribed by the computer software. Please disregard these errors. Please excuse any errors that have escaped final proofreading. Thank you.

## 2022-02-14 NOTE — TELEPHONE ENCOUNTER
Dr. Steve Kaur-  Patient's end of service event monitor report is on your desk for review.     Future Appointments   Date Time Provider Hannah Lopez   2/14/2022  3:40 PM MD OSCAR Urbina BS AMB   3/22/2022 10:40 AM MD PEYMAN Guevara BS AMB   4/11/2022  1:00 PM MD OSCAR Urbina BS AMB

## 2022-03-18 PROBLEM — N83.8 MASS OF RIGHT OVARY: Status: ACTIVE | Noted: 2019-01-22

## 2022-03-18 PROBLEM — N84.0 ENDOMETRIAL POLYP: Status: ACTIVE | Noted: 2019-03-08

## 2022-03-19 PROBLEM — R93.89 THICKENED ENDOMETRIUM: Status: ACTIVE | Noted: 2019-01-22

## 2022-03-19 PROBLEM — Z71.89 ADVANCE DIRECTIVE DISCUSSED WITH PATIENT: Status: ACTIVE | Noted: 2018-03-13

## 2022-03-20 PROBLEM — R55 SYNCOPE: Status: ACTIVE | Noted: 2021-11-27

## 2022-03-20 PROBLEM — N84.1 ENDOCERVICAL POLYP: Status: ACTIVE | Noted: 2019-03-08

## 2022-03-22 ENCOUNTER — OFFICE VISIT (OUTPATIENT)
Dept: INTERNAL MEDICINE CLINIC | Age: 72
End: 2022-03-22
Payer: MEDICARE

## 2022-03-22 VITALS
HEIGHT: 64 IN | TEMPERATURE: 97.1 F | HEART RATE: 59 BPM | OXYGEN SATURATION: 98 % | SYSTOLIC BLOOD PRESSURE: 135 MMHG | DIASTOLIC BLOOD PRESSURE: 70 MMHG | WEIGHT: 153.2 LBS | BODY MASS INDEX: 26.15 KG/M2 | RESPIRATION RATE: 15 BRPM

## 2022-03-22 DIAGNOSIS — E03.9 HYPOTHYROIDISM, ADULT: ICD-10-CM

## 2022-03-22 DIAGNOSIS — F32.A DEPRESSION, UNSPECIFIED DEPRESSION TYPE: ICD-10-CM

## 2022-03-22 DIAGNOSIS — E78.2 MIXED HYPERLIPIDEMIA: ICD-10-CM

## 2022-03-22 DIAGNOSIS — Z79.899 ENCOUNTER FOR LONG-TERM (CURRENT) USE OF MEDICATIONS: ICD-10-CM

## 2022-03-22 DIAGNOSIS — I10 BENIGN HYPERTENSION: Primary | ICD-10-CM

## 2022-03-22 PROCEDURE — 1101F PT FALLS ASSESS-DOCD LE1/YR: CPT | Performed by: INTERNAL MEDICINE

## 2022-03-22 PROCEDURE — G9717 DOC PT DX DEP/BP F/U NT REQ: HCPCS | Performed by: INTERNAL MEDICINE

## 2022-03-22 PROCEDURE — 99214 OFFICE O/P EST MOD 30 MIN: CPT | Performed by: INTERNAL MEDICINE

## 2022-03-22 PROCEDURE — G8536 NO DOC ELDER MAL SCRN: HCPCS | Performed by: INTERNAL MEDICINE

## 2022-03-22 PROCEDURE — 1090F PRES/ABSN URINE INCON ASSESS: CPT | Performed by: INTERNAL MEDICINE

## 2022-03-22 PROCEDURE — G8419 CALC BMI OUT NRM PARAM NOF/U: HCPCS | Performed by: INTERNAL MEDICINE

## 2022-03-22 PROCEDURE — G9899 SCRN MAM PERF RSLTS DOC: HCPCS | Performed by: INTERNAL MEDICINE

## 2022-03-22 PROCEDURE — G8752 SYS BP LESS 140: HCPCS | Performed by: INTERNAL MEDICINE

## 2022-03-22 PROCEDURE — 3017F COLORECTAL CA SCREEN DOC REV: CPT | Performed by: INTERNAL MEDICINE

## 2022-03-22 PROCEDURE — G8427 DOCREV CUR MEDS BY ELIG CLIN: HCPCS | Performed by: INTERNAL MEDICINE

## 2022-03-22 PROCEDURE — G0463 HOSPITAL OUTPT CLINIC VISIT: HCPCS | Performed by: INTERNAL MEDICINE

## 2022-03-22 PROCEDURE — G8399 PT W/DXA RESULTS DOCUMENT: HCPCS | Performed by: INTERNAL MEDICINE

## 2022-03-22 PROCEDURE — G8754 DIAS BP LESS 90: HCPCS | Performed by: INTERNAL MEDICINE

## 2022-03-22 NOTE — PROGRESS NOTES
Assessment/Plan:     1. Benign hypertension  -I evaluated and recommended to continue current doses of medications.     - METABOLIC PANEL, COMPREHENSIVE; Future    2. Mixed hyperlipidemia  -compliant with use of her simvastatin 40mg daily. -following with Dr. Joey Bardales. Has calcium score 188. Heart CT done in 8038    - METABOLIC PANEL, COMPREHENSIVE; Future  - LIPID PANEL; Future    3. Hypothyroidism, adult  -appears clinically euthyroid.   -taking levothyroxine 100mcg daily.     - TSH 3RD GENERATION; Future  - T4, FREE; Future    4. Depression, unspecified depression type  -I evaluated and recommended to continue current doses of medications. 5. Encounter for long-term (current) use of medications    Orders Placed This Encounter    METABOLIC PANEL, COMPREHENSIVE     Standing Status:   Future     Standing Expiration Date:   3/22/2023    TSH 3RD GENERATION     Standing Status:   Future     Standing Expiration Date:   3/22/2023    T4, FREE     Standing Status:   Future     Standing Expiration Date:   3/22/2023    LIPID PANEL     Standing Status:   Future     Standing Expiration Date:   3/22/2023             Follow-up Disposition:       Follow up in 6 months             Subjective: Loraine Holcomb is a 66 y.o. female who presents today for follow up of her hypertension, hyperlipidemia,depression and hypothyroid. Since last visit 9/28/2021:  -having pain in right knee - injected right knee Dr. Martin Alaniz. History of injury to right knee in past from skiing. Has venkatesh and plates in right knee. She states that the meniscus is now worn down. - blood pressure at home systolic in the 299-619 range with diastolic around 93-84  -going to [facility] in April for vacation. Patient Care Team:  -Dr. Leyla Isabel - dermatology  -Dr. Chuy Raygoza - benign ovarian cyst  -Dr. Madi Wynn  -Dr. Amrik Norris - eye care  -Dr. Lauren Ralph, cardiology - elevated calcium score   -Dr. Christel Bruce, eye care. [facility]  -Dr. Valdemar Covarrubias, eye care.  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 Saskia Mandujano, ortho. Objective: Wt Readings from Last 3 Encounters:   02/14/22 152 lb (68.9 kg)   11/28/21 152 lb 1.9 oz (69 kg)   10/26/21 155 lb (70.3 kg)     BP Readings from Last 3 Encounters:   02/14/22 134/84   11/28/21 (!) 149/69   10/26/21 130/84     Visit Vitals  BP (!) 143/79   Pulse (!) 59   Temp 97.1 °F (36.2 °C) (Temporal)   Resp 15   Ht 5' 4\" (1.626 m)   Wt 153 lb 3.2 oz (69.5 kg)   SpO2 98%   BMI 26.30 kg/m²     General appearance: alert, cooperative, no distress, appears stated age  Head: Normocephalic, without obvious abnormality, atraumatic  Neck: supple, symmetrical, trachea midline, no adenopathy, thyroid: not enlarged, symmetric, no tenderness/mass/nodules, no carotid bruit and no JVD  Lungs: clear to auscultation bilaterally  Heart: regular rate and rhythm, S1, S2 normal, no murmur, click, rub or gallop  Abdomen: soft, non-tender. Bowel sounds normal. No masses,  no organomegaly  Extremities: extremities normal, atraumatic, no cyanosis or edema  Pulses: 2+ and symmetric              Disclaimer:  Return if symptoms worsen or fail to improve. Advised patient to call back or return to office if symptoms worsen/change/persist.     Discussed expected course/resolution/complications of diagnosis in detail with patient. Medication risks/benefits/costs/interactions/alternatives discussed with patient. Patient was given an after visit summary which includes diagnoses, current medications, & vitals. Patient expressed understanding with the diagnosis and plan.

## 2022-03-23 LAB
ALBUMIN SERPL-MCNC: 4.2 G/DL (ref 3.5–5)
ALBUMIN/GLOB SERPL: 1.4 {RATIO} (ref 1.1–2.2)
ALP SERPL-CCNC: 106 U/L (ref 45–117)
ALT SERPL-CCNC: 34 U/L (ref 12–78)
ANION GAP SERPL CALC-SCNC: 3 MMOL/L (ref 5–15)
AST SERPL-CCNC: 20 U/L (ref 15–37)
BILIRUB SERPL-MCNC: 0.5 MG/DL (ref 0.2–1)
BUN SERPL-MCNC: 14 MG/DL (ref 6–20)
BUN/CREAT SERPL: 20 (ref 12–20)
CALCIUM SERPL-MCNC: 9.6 MG/DL (ref 8.5–10.1)
CHLORIDE SERPL-SCNC: 103 MMOL/L (ref 97–108)
CHOLEST SERPL-MCNC: 206 MG/DL
CO2 SERPL-SCNC: 29 MMOL/L (ref 21–32)
CREAT SERPL-MCNC: 0.71 MG/DL (ref 0.55–1.02)
GLOBULIN SER CALC-MCNC: 2.9 G/DL (ref 2–4)
GLUCOSE SERPL-MCNC: 94 MG/DL (ref 65–100)
HDLC SERPL-MCNC: 78 MG/DL
HDLC SERPL: 2.6 {RATIO} (ref 0–5)
LDLC SERPL CALC-MCNC: 106.8 MG/DL (ref 0–100)
POTASSIUM SERPL-SCNC: 4.8 MMOL/L (ref 3.5–5.1)
PROT SERPL-MCNC: 7.1 G/DL (ref 6.4–8.2)
SODIUM SERPL-SCNC: 135 MMOL/L (ref 136–145)
T4 FREE SERPL-MCNC: 1.2 NG/DL (ref 0.8–1.5)
TRIGL SERPL-MCNC: 106 MG/DL (ref ?–150)
TSH SERPL DL<=0.05 MIU/L-ACNC: 0.41 UIU/ML (ref 0.36–3.74)
VLDLC SERPL CALC-MCNC: 21.2 MG/DL

## 2022-04-11 ENCOUNTER — OFFICE VISIT (OUTPATIENT)
Dept: CARDIOLOGY CLINIC | Age: 72
End: 2022-04-11
Payer: MEDICARE

## 2022-04-11 VITALS
RESPIRATION RATE: 16 BRPM | HEIGHT: 64 IN | WEIGHT: 151 LBS | BODY MASS INDEX: 25.78 KG/M2 | OXYGEN SATURATION: 97 % | DIASTOLIC BLOOD PRESSURE: 70 MMHG | SYSTOLIC BLOOD PRESSURE: 138 MMHG | HEART RATE: 67 BPM

## 2022-04-11 DIAGNOSIS — Z82.49 FAMILY HISTORY OF PREMATURE CAD: ICD-10-CM

## 2022-04-11 DIAGNOSIS — I10 PRIMARY HYPERTENSION: ICD-10-CM

## 2022-04-11 DIAGNOSIS — R93.1 AGATSTON CAC SCORE 100-199: Primary | ICD-10-CM

## 2022-04-11 DIAGNOSIS — E78.2 MIXED HYPERLIPIDEMIA: ICD-10-CM

## 2022-04-11 PROCEDURE — G9717 DOC PT DX DEP/BP F/U NT REQ: HCPCS | Performed by: SPECIALIST

## 2022-04-11 PROCEDURE — G8752 SYS BP LESS 140: HCPCS | Performed by: SPECIALIST

## 2022-04-11 PROCEDURE — G8427 DOCREV CUR MEDS BY ELIG CLIN: HCPCS | Performed by: SPECIALIST

## 2022-04-11 PROCEDURE — G0463 HOSPITAL OUTPT CLINIC VISIT: HCPCS | Performed by: SPECIALIST

## 2022-04-11 PROCEDURE — G9899 SCRN MAM PERF RSLTS DOC: HCPCS | Performed by: SPECIALIST

## 2022-04-11 PROCEDURE — 1090F PRES/ABSN URINE INCON ASSESS: CPT | Performed by: SPECIALIST

## 2022-04-11 PROCEDURE — G8419 CALC BMI OUT NRM PARAM NOF/U: HCPCS | Performed by: SPECIALIST

## 2022-04-11 PROCEDURE — G8399 PT W/DXA RESULTS DOCUMENT: HCPCS | Performed by: SPECIALIST

## 2022-04-11 PROCEDURE — 1101F PT FALLS ASSESS-DOCD LE1/YR: CPT | Performed by: SPECIALIST

## 2022-04-11 PROCEDURE — G8536 NO DOC ELDER MAL SCRN: HCPCS | Performed by: SPECIALIST

## 2022-04-11 PROCEDURE — 3017F COLORECTAL CA SCREEN DOC REV: CPT | Performed by: SPECIALIST

## 2022-04-11 PROCEDURE — G8754 DIAS BP LESS 90: HCPCS | Performed by: SPECIALIST

## 2022-04-11 PROCEDURE — 99213 OFFICE O/P EST LOW 20 MIN: CPT | Performed by: SPECIALIST

## 2022-04-11 NOTE — PROGRESS NOTES
HISTORY OF PRESENT ILLNESS  Pam De Los Santos is a 66 y.o. female     SUMMARY:   Problem List  Date Reviewed: 4/11/2022          Codes Class Noted    Syncope ICD-10-CM: R55  ICD-9-CM: 780.2  11/27/2021        Endometrial polyp ICD-10-CM: N84.0  ICD-9-CM: 621.0  3/8/2019        Endocervical polyp ICD-10-CM: N84.1  ICD-9-CM: 622.7  3/8/2019        Mass of right ovary ICD-10-CM: N83.8  ICD-9-CM: 620.8  1/22/2019        Thickened endometrium ICD-10-CM: R93.89  ICD-9-CM: 793.5  1/22/2019        Advance directive discussed with patient ICD-10-CM: Z71.89  ICD-9-CM: V65.49  3/13/2018    Overview Signed 3/13/2018 11:13 AM by Maxwell Rogers MD     3/13/2018 - -patient has an established AD. Will bring copy in for chart. H/O bone density study (Chronic) ICD-10-CM: Z92.89  ICD-9-CM: V15.89  9/9/2016    Overview Addendum 2/22/2022  5:20 PM by Maxwell Rogers MD     [facility] [place],11/9/2021- osteopenia. Followed by Dr. 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 Ismael gibbs. Follow up in 5 years.               History of screening mammography (Chronic) ICD-10-CM: Q69.20  ICD-9-CM: V15.89  7/10/2012    Overview Addendum 2/22/2022  5:19 PM by Maxwell Rogers MD     [facility], 11/9/2021             Pap smear for cervical cancer screening (Chronic) ICD-10-CM: Z12.4  ICD-9-CM: V76.2  7/10/2012    Overview Addendum 3/10/2017 11:02 AM by Maxwell Rogers MD     6/16, [facility]             Hypothyroid ICD-10-CM: E03.9  ICD-9-CM: 244.9  12/7/2011        Depression ICD-10-CM: F32. A  ICD-9-CM: 464  12/7/2011        HTN (hypertension) ICD-10-CM: I10  ICD-9-CM: 401.9  12/7/2011        Fibrocystic breast ICD-10-CM: N60.19  ICD-9-CM: 610.1  12/7/2011        S/p tibial fracture ICD-10-CM: Z87.81  ICD-9-CM: V54.16  12/7/2011    Overview Signed 12/7/2011  2:17 PM by Lizzy Montalvo MD     Right with venkatesh placement, 1995             S/P hemorrhoidectomy ICD-10-CM: E53.046, Z87.19  ICD-9-CM: V45.89  12/7/2011    Overview Signed 12/7/2011  2:18 PM by MD Dr. Yves Castillo             Environmental allergies ICD-10-CM: Z91.09  ICD-9-CM: V15.09  12/7/2011        S/P laparoscopic cholecystectomy ICD-10-CM: X97.94  ICD-9-CM: V45.89  12/7/2011        Hiatal hernia ICD-10-CM: K44.9  ICD-9-CM: 553.3  12/7/2011    Overview Signed 12/7/2011  2:18 PM by Lizzy Montalvo MD     egd 11/9/10, Dr. Alexa Hernandez Medications on File Prior to Visit   Medication Sig    Synthroid 100 mcg tablet TAKE 1 TABLET DAILY BEFORE BREAKFAST    FLUoxetine (PROzac) 20 mg capsule TAKE 1 CAPSULE DAILY    valsartan (DIOVAN) 160 mg tablet Take 1 Tablet by mouth daily.  simvastatin (ZOCOR) 40 mg tablet TAKE 1 TABLET NIGHTLY    lifitegrast (Xiidra) 5 % dpet Administer 1 Drop to both eyes two (2) times a day.  doxycycline (VIBRA-TABS) 100 mg tablet Take 100 mg by mouth two (2) times a day.  calcium carbonate (TUMS) 200 mg calcium (500 mg) chew Take 1 Tab by mouth daily. As needed    aspirin delayed-release 81 mg tablet Take  by mouth daily.  coenzyme q10 300 mg cap Take 300 mg by mouth daily.  LACTOBACILLUS ACIDOPHILUS (PROBIOTIC PO) Take 1 Cap by mouth daily.  BOSWELLIA SURENDRA EXTRACT (BOSWELLIA SURENDRA XT, BULK,) Take  by mouth daily. Anti-inflammatory supplement. Takes one cap po daily.  MULTIVITAMIN PO Take  by mouth daily. Takes one po daily.      No current facility-administered medications on file prior to visit. CARDIOLOGY STUDIES TO DATE:  12/11 normal stress echo at [facility] (notes reviewed and summarized under media tab)    5/16 score 188, 80th percentile  6/17 normal stress echo  9/19 normal stress echo  11/21 mild mr, otherwise normal echo  11/21 carotid dopplers less than 50% bilateral stenoses    Chief Complaint   Patient presents with    Follow-up     HPI :  She is doing well with no cardiac complaints. No further syncope. Ms. got back from [place] and thought it was too crowded. The head and did not know the end of this month. Recent lipid profile looked pretty good her HDL was high but her LDL was 106. Still no regular exercise. Blood pressures well controlled.     CARDIAC ROS:   negative for chest pain, dyspnea, palpitations, syncope, orthopnea, paroxysmal nocturnal dyspnea, exertional chest pressure/discomfort, claudication, lower extremity edema    Family History   Problem Relation Age of Onset    Alcohol abuse Mother         cirrhosis    Alcohol abuse Father     Other Father         cerebral hemorrhage    Cancer Brother         prostate    Heart Disease Brother 46        MI(age 46), bypass(66)    Diabetes Maternal Aunt     Hypertension Maternal Aunt     Cancer Maternal Uncle         COLON    Heart Disease Maternal Grandmother         MI    Cancer Cousin         BREAST    Anesth Problems Neg Hx        Past Medical History:   Diagnosis Date    Arthritis     lower back    Cancer ([address]     BCCAs removed - 6 on legs    Depression     Environmental allergies 12/7/2011    Fibrocystic breast 12/7/2011    GERD (gastroesophageal reflux disease)     Hiatal hernia 12/7/2011    HIGH BLOOD PRESSURE     not on meds    HTN (hypertension) 12/7/2011    Hypothyroid 12/7/2011    Nausea & vomiting     S/P hemorrhoidectomy 12/7/2011    S/P laparoscopic cholecystectomy 12/7/2011    S/p tibial fracture 12/7/2011    Tendonitis of wrist, left 12/2017    cortisone shot    Thyroid disorder        GENERAL ROS:  A comprehensive review of systems was negative except for that written in the HPI.     Visit Vitals  /70   Pulse 67   Resp 16   Ht 5' 4\" (1.626 m)   Wt 151 lb (68.5 kg)   SpO2 97%   BMI 25.92 kg/m²       Wt Readings from Last 3 Encounters:   04/11/22 151 lb (68.5 kg)   03/22/22 153 lb 3.2 oz (69.5 kg)   02/14/22 152 lb (68.9 kg)            BP Readings from Last 3 Encounters:   04/11/22 138/70   03/22/22 135/70   02/14/22 134/84       PHYSICAL EXAM  General appearance: alert, cooperative, no distress, appears stated age  Neurologic: Alert and oriented X 3  Neck: supple, symmetrical, trachea midline, no adenopathy, no carotid bruit and no JVD  Lungs: clear to auscultation bilaterally  Heart: regular rate and rhythm, S1, S2 normal, no murmur, click, rub or gallop  Extremities: extremities normal, atraumatic, no cyanosis or edema    Lab Results   Component Value Date/Time    Cholesterol, total 206 (H) 03/22/2022 11:17 AM    Cholesterol, total 212 (H) 11/28/2021 01:43 AM    Cholesterol, total 204 (H) 09/28/2021 01:24 PM    Cholesterol, total 236 (H) 03/23/2021 10:32 AM    Cholesterol, total 229 (H) 08/07/2020 12:00 AM    HDL Cholesterol 78 03/22/2022 11:17 AM    HDL Cholesterol 82 11/28/2021 01:43 AM    HDL Cholesterol 87 09/28/2021 01:24 PM    HDL Cholesterol 85 03/23/2021 10:32 AM    HDL Cholesterol 79 08/07/2020 12:00 AM    LDL, calculated 106.8 (H) 03/22/2022 11:17 AM    LDL, calculated 102.6 (H) 11/28/2021 01:43 AM    LDL, calculated 95.6 09/28/2021 01:24 PM    LDL, calculated 122.8 (H) 03/23/2021 10:32 AM    LDL, calculated 116 (H) 08/07/2020 12:00 AM    Triglyceride 106 03/22/2022 11:17 AM    Triglyceride 137 11/28/2021 01:43 AM    Triglyceride 107 09/28/2021 01:24 PM    Triglyceride 141 03/23/2021 10:32 AM    Triglyceride 169 (H) 08/07/2020 12:00 AM    CHOL/HDL Ratio 2.6 03/22/2022 11:17 AM    CHOL/HDL Ratio 2.6 11/28/2021 01:43 AM CHOL/HDL Ratio 2.3 09/28/2021 01:24 PM    CHOL/HDL Ratio 2.8 03/23/2021 10:32 AM    CHOL/HDL Ratio 2.4 07/15/2010 11:10 AM     ASSESSMENT :      She is stable and asymptomatic, well compensated on a good medical regimen and needs no cardiac testing at this time. current treatment plan is effective, no change in therapy  lab results and schedule of future lab studies reviewed with patient  reviewed diet, exercise and weight control    Encounter Diagnoses   Name Primary?  Agatston CAC score 100-199 Yes    Primary hypertension     Mixed hyperlipidemia     Family history of premature CAD      No orders of the defined types were placed in this encounter. Follow-up and Dispositions    · Return in about 1 year (around 4/11/2023). Brandon Wilkes MD  4/11/2022  Please note that this dictation was completed with WakeMate, the computer voice recognition software. Quite often unanticipated grammatical, syntax, homophones, and other interpretive errors are inadvertently transcribed by the computer software. Please disregard these errors. Please excuse any errors that have escaped final proofreading. Thank you.

## 2022-05-05 RX ORDER — SIMVASTATIN 40 MG/1
TABLET, FILM COATED ORAL
Qty: 90 TABLET | Refills: 3 | Status: SHIPPED | OUTPATIENT
Start: 2022-05-05

## 2022-08-11 RX ORDER — FLUOXETINE HYDROCHLORIDE 20 MG/1
CAPSULE ORAL
Qty: 90 CAPSULE | Refills: 3 | Status: SHIPPED | OUTPATIENT
Start: 2022-08-11

## 2022-08-27 ENCOUNTER — APPOINTMENT (OUTPATIENT)
Dept: CT IMAGING | Age: 72
End: 2022-08-27
Attending: EMERGENCY MEDICINE
Payer: MEDICARE

## 2022-08-27 ENCOUNTER — HOSPITAL ENCOUNTER (EMERGENCY)
Dept: MRI IMAGING | Age: 72
Discharge: HOME | End: 2022-08-27
Attending: EMERGENCY MEDICINE
Payer: MEDICARE

## 2022-08-27 ENCOUNTER — HOSPITAL ENCOUNTER (EMERGENCY)
Age: 72
Discharge: HOME | End: 2022-08-27
Attending: EMERGENCY MEDICINE
Payer: MEDICARE

## 2022-08-27 VITALS
SYSTOLIC BLOOD PRESSURE: 166 MMHG | OXYGEN SATURATION: 100 % | DIASTOLIC BLOOD PRESSURE: 89 MMHG | HEART RATE: 66 BPM | RESPIRATION RATE: 14 BRPM | BODY MASS INDEX: 26.35 KG/M2 | HEIGHT: 64 IN | TEMPERATURE: 98.2 F | WEIGHT: 154.32 LBS

## 2022-08-27 DIAGNOSIS — H81.10 BENIGN PAROXYSMAL POSITIONAL VERTIGO, UNSPECIFIED LATERALITY: Primary | ICD-10-CM

## 2022-08-27 LAB
ANION GAP SERPL CALC-SCNC: 5 MMOL/L (ref 5–15)
ATRIAL RATE: 62 BPM
BASOPHILS # BLD: 0 K/UL (ref 0–0.1)
BASOPHILS NFR BLD: 1 % (ref 0–1)
BUN SERPL-MCNC: 19 MG/DL (ref 6–20)
BUN/CREAT SERPL: 26 (ref 12–20)
CALCIUM SERPL-MCNC: 8.9 MG/DL (ref 8.5–10.1)
CALCULATED P AXIS, ECG09: 21 DEGREES
CALCULATED R AXIS, ECG10: 5 DEGREES
CALCULATED T AXIS, ECG11: 26 DEGREES
CHLORIDE SERPL-SCNC: 107 MMOL/L (ref 97–108)
CO2 SERPL-SCNC: 27 MMOL/L (ref 21–32)
COMMENT, HOLDF: NORMAL
CREAT SERPL-MCNC: 0.74 MG/DL (ref 0.55–1.02)
DIAGNOSIS, 93000: NORMAL
DIFFERENTIAL METHOD BLD: NORMAL
EOSINOPHIL # BLD: 0.1 K/UL (ref 0–0.4)
EOSINOPHIL NFR BLD: 1 % (ref 0–7)
ERYTHROCYTE [DISTWIDTH] IN BLOOD BY AUTOMATED COUNT: 11.9 % (ref 11.5–14.5)
GLUCOSE SERPL-MCNC: 156 MG/DL (ref 65–100)
HCT VFR BLD AUTO: 43.8 % (ref 35–47)
HGB BLD-MCNC: 14.7 G/DL (ref 11.5–16)
IMM GRANULOCYTES # BLD AUTO: 0 K/UL (ref 0–0.04)
IMM GRANULOCYTES NFR BLD AUTO: 0 % (ref 0–0.5)
LYMPHOCYTES # BLD: 1 K/UL (ref 0.8–3.5)
LYMPHOCYTES NFR BLD: 18 % (ref 12–49)
MCH RBC QN AUTO: 32.5 PG (ref 26–34)
MCHC RBC AUTO-ENTMCNC: 33.6 G/DL (ref 30–36.5)
MCV RBC AUTO: 96.7 FL (ref 80–99)
MONOCYTES # BLD: 0.4 K/UL (ref 0–1)
MONOCYTES NFR BLD: 7 % (ref 5–13)
NEUTS SEG # BLD: 3.8 K/UL (ref 1.8–8)
NEUTS SEG NFR BLD: 73 % (ref 32–75)
NRBC # BLD: 0 K/UL (ref 0–0.01)
NRBC BLD-RTO: 0 PER 100 WBC
P-R INTERVAL, ECG05: 122 MS
PLATELET # BLD AUTO: 314 K/UL (ref 150–400)
PMV BLD AUTO: 10.2 FL (ref 8.9–12.9)
POTASSIUM SERPL-SCNC: 3.8 MMOL/L (ref 3.5–5.1)
Q-T INTERVAL, ECG07: 424 MS
QRS DURATION, ECG06: 74 MS
QTC CALCULATION (BEZET), ECG08: 430 MS
RBC # BLD AUTO: 4.53 M/UL (ref 3.8–5.2)
SAMPLES BEING HELD,HOLD: NORMAL
SODIUM SERPL-SCNC: 139 MMOL/L (ref 136–145)
VENTRICULAR RATE, ECG03: 62 BPM
WBC # BLD AUTO: 5.3 K/UL (ref 3.6–11)

## 2022-08-27 PROCEDURE — 74011250636 HC RX REV CODE- 250/636: Performed by: EMERGENCY MEDICINE

## 2022-08-27 PROCEDURE — 36415 COLL VENOUS BLD VENIPUNCTURE: CPT

## 2022-08-27 PROCEDURE — 74011250637 HC RX REV CODE- 250/637: Performed by: EMERGENCY MEDICINE

## 2022-08-27 PROCEDURE — 80048 BASIC METABOLIC PNL TOTAL CA: CPT

## 2022-08-27 PROCEDURE — 85025 COMPLETE CBC W/AUTO DIFF WBC: CPT

## 2022-08-27 PROCEDURE — 93005 ELECTROCARDIOGRAM TRACING: CPT

## 2022-08-27 PROCEDURE — 70450 CT HEAD/BRAIN W/O DYE: CPT

## 2022-08-27 PROCEDURE — 70553 MRI BRAIN STEM W/O & W/DYE: CPT

## 2022-08-27 PROCEDURE — A9576 INJ PROHANCE MULTIPACK: HCPCS | Performed by: EMERGENCY MEDICINE

## 2022-08-27 PROCEDURE — 99285 EMERGENCY DEPT VISIT HI MDM: CPT

## 2022-08-27 PROCEDURE — 96374 THER/PROPH/DIAG INJ IV PUSH: CPT

## 2022-08-27 RX ORDER — MECLIZINE HCL 12.5 MG 12.5 MG/1
25 TABLET ORAL
Status: COMPLETED | OUTPATIENT
Start: 2022-08-27 | End: 2022-08-27

## 2022-08-27 RX ORDER — NIFEDIPINE 30 MG/1
30 TABLET, EXTENDED RELEASE ORAL DAILY
Qty: 30 TABLET | Refills: 0 | Status: SHIPPED | OUTPATIENT
Start: 2022-08-27

## 2022-08-27 RX ORDER — MECLIZINE HYDROCHLORIDE 25 MG/1
25 TABLET ORAL
Qty: 20 TABLET | Refills: 0 | Status: SHIPPED | OUTPATIENT
Start: 2022-08-27 | End: 2022-09-06

## 2022-08-27 RX ORDER — DIPHENHYDRAMINE HYDROCHLORIDE 50 MG/ML
25 INJECTION, SOLUTION INTRAMUSCULAR; INTRAVENOUS
Status: COMPLETED | OUTPATIENT
Start: 2022-08-27 | End: 2022-08-27

## 2022-08-27 RX ORDER — ONDANSETRON 4 MG/1
4 TABLET, ORALLY DISINTEGRATING ORAL
Status: COMPLETED | OUTPATIENT
Start: 2022-08-27 | End: 2022-08-27

## 2022-08-27 RX ADMIN — GADOTERIDOL 14 ML: 279.3 INJECTION, SOLUTION INTRAVENOUS at 16:22

## 2022-08-27 RX ADMIN — MECLIZINE 25 MG: 12.5 TABLET ORAL at 15:23

## 2022-08-27 RX ADMIN — MECLIZINE 25 MG: 12.5 TABLET ORAL at 13:06

## 2022-08-27 RX ADMIN — DIPHENHYDRAMINE HYDROCHLORIDE 25 MG: 50 INJECTION, SOLUTION INTRAMUSCULAR; INTRAVENOUS at 15:23

## 2022-08-27 RX ADMIN — ONDANSETRON 4 MG: 4 TABLET, ORALLY DISINTEGRATING ORAL at 17:44

## 2022-08-27 NOTE — ED PROVIDER NOTES
75-year-old female with PMHx of depression, hypertension, hypothyroid, presents to the emergency department complaining of vertiginous dizziness x4 days. Patient states that the vertigo is positional and comes on when she moves her head but resolves when she keeps her head still. She reports some associated nausea and difficulty with balance. She reports 1 episode a couple of months ago in which she syncopized, after which she underwent a work-up including an MRI which was negative for any acute intracranial pathology. She has no additional complaints at this time    The history is provided by the patient and the spouse. Dizziness  This is a new problem. The current episode started more than 2 days ago. The problem has been gradually worsening. There was no focality noted. Primary symptoms include loss of balance. Pertinent negatives include no focal weakness, no loss of sensation, no slurred speech, no visual change, no auditory change, no mental status change and no disorientation. There has been no fever. Associated symptoms include nausea. Nausea        Past Medical History:   Diagnosis Date    Arthritis     lower back    Cancer ([address]     BCCAs removed - 6 on legs    Depression     Environmental allergies 12/7/2011    Fibrocystic breast 12/7/2011    GERD (gastroesophageal reflux disease)     Hiatal hernia 12/7/2011    HIGH BLOOD PRESSURE     not on meds    HTN (hypertension) 12/7/2011    Hypothyroid 12/7/2011    Nausea & vomiting     S/P hemorrhoidectomy 12/7/2011    S/P laparoscopic cholecystectomy 12/7/2011    S/p tibial fracture 12/7/2011    Tendonitis of wrist, left 12/2017    cortisone shot    Thyroid disorder        Past Surgical History:   Procedure Laterality Date    ENDOSCOPY, COLON, DIAGNOSTIC  11/2010    ([facility])-f/u 5 yrs.     HX BREAST LUMPECTOMY      right - benign    HX CHOLECYSTECTOMY      HX GI      hemorrhoidectomy    HX GYN  03/2019    d/c for thickened endometrium    HX ORTHOPAEDIC surgery for tibial plateau fx - took bone from hip/has hardware knee to ankle         Family History:   Problem Relation Age of Onset    Alcohol abuse Mother         cirrhosis    Alcohol abuse Father     Other Father         cerebral hemorrhage    Cancer Brother         prostate    Heart Disease Brother 46        MI(age 46), bypass(66)    Diabetes Maternal Aunt     Hypertension Maternal Aunt     Cancer Maternal Uncle         COLON    Heart Disease Maternal Grandmother         MI    Cancer Cousin         BREAST    Anesth Problems Neg Hx        Social History     Socioeconomic History    Marital status:      Spouse name: Not on file    Number of children: Not on file    Years of education: Not on file    Highest education level: Not on file   Occupational History    Not on file   Tobacco Use    Smoking status: Never    Smokeless tobacco: Never   Substance and Sexual Activity    Alcohol use: Yes     Alcohol/week: 7.0 standard drinks     Types: 7 Glasses of wine per week     Comment: wine    Drug use: No    Sexual activity: Never     Partners: Male     Comment: partner has decided   Other Topics Concern    Not on file   Social History Narrative    Not on file     Social Determinants of Health     Financial Resource Strain: Not on file   Food Insecurity: Not on file   Transportation Needs: Not on file   Physical Activity: Not on file   Stress: Not on file   Social Connections: Not on file   Intimate Partner Violence: Not on file   Housing Stability: Not on file         ALLERGIES: Adhesive, Cephalexin, Ciprofloxacin, E-mycin [erythromycin], Sudafed [pseudoephedrine hcl], and Zinc    Review of Systems   Constitutional: Negative. HENT: Negative. Eyes: Negative. Respiratory: Negative. Cardiovascular: Negative. Gastrointestinal:  Positive for nausea. Endocrine: Negative. Genitourinary: Negative. Musculoskeletal: Negative. Skin: Negative.     Neurological:  Positive for dizziness and loss of balance. Negative for focal weakness. Hematological: Negative. Psychiatric/Behavioral: Negative. Vitals:    08/27/22 1201   BP: (!) 153/70   Pulse: 64   Resp: 19   Temp: 98.2 °F (36.8 °C)   SpO2: 96%   Weight: 70 kg (154 lb 5.2 oz)   Height: 5' 4\" (1.626 m)            Physical Exam  Vitals and nursing note reviewed. Constitutional:       General: She is not in acute distress. Appearance: Normal appearance. She is not ill-appearing. HENT:      Head: Normocephalic and atraumatic. Nose: Nose normal.      Mouth/Throat:      Mouth: Mucous membranes are moist.   Eyes:      Extraocular Movements: Extraocular movements intact. Pupils: Pupils are equal, round, and reactive to light. Cardiovascular:      Rate and Rhythm: Normal rate and regular rhythm. Pulses: Normal pulses. Pulmonary:      Effort: Pulmonary effort is normal. No respiratory distress. Breath sounds: Normal breath sounds. Abdominal:      General: There is no distension. Palpations: Abdomen is soft. Tenderness: There is no abdominal tenderness. Musculoskeletal:         General: Normal range of motion. Cervical back: Normal range of motion and neck supple. Skin:     General: Skin is warm and dry. Neurological:      General: No focal deficit present. Mental Status: She is alert and oriented to person, place, and time. Comments: Horizontal nystagmus appreciated, extinguishes within 5 beats. Psychiatric:         Mood and Affect: Mood normal.        MDM  Number of Diagnoses or Management Options  Benign paroxysmal positional vertigo, unspecified laterality  Diagnosis management comments: DDx: Peripheral vertigo, central vertigo, BPPV, Ménière's, labyrinthitis. No vertical nystagmus, negative test of skew, negative head impulse test to suggest against central vertigo.     Plan:  - Labs: CBC, BMP  - Imaging: Noncon head CT  - Medications: Meclizine    Reassessment: Following meclizine, patient reports no improvement in her vertigo. Given the relative persistence of her symptoms, will pursue MRI brain in the ED. We will try another dose of meclizine with diphenhydramine    Reassessment: Patient's MRI brain is notable only for medic microvascular changes. She reports some improvement with the above interventions. Will discharge home at this time with recommendation to follow-up with PCP. Her blood pressure was also noted to be significantly elevated to SBP 160s to 200s in the ED. Will prescribe nifedipine to take in addition to her home valsartan.              Amount and/or Complexity of Data Reviewed  Clinical lab tests: ordered and reviewed  Tests in the radiology section of CPT®: ordered and reviewed  Tests in the medicine section of CPT®: ordered and reviewed  Obtain history from someone other than the patient: yes    Risk of Complications, Morbidity, and/or Mortality  Presenting problems: moderate  Diagnostic procedures: low  Management options: low    Patient Progress  Patient progress: improved         Procedures

## 2022-08-27 NOTE — ED TRIAGE NOTES
ED triage note: Ambulatory with a steady gait. Reports since Wednesday since she woke up she has been dizzy accompanied by nausea but no vomiting, dull headache in front. Denies diarrhea. Reports feeling cold and clammy.

## 2022-08-27 NOTE — ED NOTES
Discharge instructions reviewed with patient and spouse. Discharge instructions given to pt per Dr. Jenny Aguilar. Patient able to return/verbalize discharge instructions. Copy of discharge instructions given. Pt condition stable, respiratory status within normal limits, neuro status intact. Ambulated out of ER, accompanied by her .

## 2022-08-27 NOTE — DISCHARGE INSTRUCTIONS
You were seen in the emergency department for vertigo. The results of your tests including an EKG, imaging, and lab work, were normal.  Although an exact cause of your symptoms was not identified, the most likely cause is peripheral vertigo. Please take any medications prescribed at this visit as instructed. Please also follow-up with your PCP or return to the emergency department if you experience a worsening of symptoms or any new symptoms that are concerning to you.

## 2022-11-10 ENCOUNTER — OFFICE VISIT (OUTPATIENT)
Dept: INTERNAL MEDICINE CLINIC | Age: 72
End: 2022-11-10
Payer: MEDICARE

## 2022-11-10 VITALS
HEART RATE: 52 BPM | SYSTOLIC BLOOD PRESSURE: 125 MMHG | HEIGHT: 64 IN | DIASTOLIC BLOOD PRESSURE: 72 MMHG | OXYGEN SATURATION: 96 % | RESPIRATION RATE: 16 BRPM | BODY MASS INDEX: 26.46 KG/M2 | WEIGHT: 155 LBS | TEMPERATURE: 97.1 F

## 2022-11-10 DIAGNOSIS — F32.A DEPRESSION, UNSPECIFIED DEPRESSION TYPE: ICD-10-CM

## 2022-11-10 DIAGNOSIS — I10 BENIGN HYPERTENSION: ICD-10-CM

## 2022-11-10 DIAGNOSIS — E78.2 MIXED HYPERLIPIDEMIA: ICD-10-CM

## 2022-11-10 DIAGNOSIS — Z00.00 MEDICARE ANNUAL WELLNESS VISIT, SUBSEQUENT: Primary | ICD-10-CM

## 2022-11-10 DIAGNOSIS — H81.10 BENIGN PAROXYSMAL POSITIONAL VERTIGO, UNSPECIFIED LATERALITY: ICD-10-CM

## 2022-11-10 DIAGNOSIS — Z79.899 ENCOUNTER FOR LONG-TERM (CURRENT) USE OF MEDICATIONS: ICD-10-CM

## 2022-11-10 DIAGNOSIS — E03.9 HYPOTHYROIDISM, ADULT: ICD-10-CM

## 2022-11-10 DIAGNOSIS — I67.9 CEREBROVASCULAR SMALL VESSEL DISEASE: ICD-10-CM

## 2022-11-10 DIAGNOSIS — Z13.31 SCREENING FOR DEPRESSION: ICD-10-CM

## 2022-11-10 PROBLEM — R55 SYNCOPE: Status: RESOLVED | Noted: 2021-11-27 | Resolved: 2022-11-10

## 2022-11-10 PROBLEM — N84.0 ENDOMETRIAL POLYP: Status: RESOLVED | Noted: 2019-03-08 | Resolved: 2022-11-10

## 2022-11-10 PROBLEM — R93.89 THICKENED ENDOMETRIUM: Status: RESOLVED | Noted: 2019-01-22 | Resolved: 2022-11-10

## 2022-11-10 PROBLEM — N83.8 MASS OF RIGHT OVARY: Status: RESOLVED | Noted: 2019-01-22 | Resolved: 2022-11-10

## 2022-11-10 LAB
ALBUMIN SERPL-MCNC: 4 G/DL (ref 3.5–5)
ALBUMIN/GLOB SERPL: 1.4 {RATIO} (ref 1.1–2.2)
ALP SERPL-CCNC: 113 U/L (ref 45–117)
ALT SERPL-CCNC: 44 U/L (ref 12–78)
ANION GAP SERPL CALC-SCNC: 5 MMOL/L (ref 5–15)
APPEARANCE UR: CLEAR
AST SERPL-CCNC: 27 U/L (ref 15–37)
BACTERIA URNS QL MICRO: NEGATIVE /HPF
BASOPHILS # BLD: 0.1 K/UL (ref 0–0.1)
BASOPHILS NFR BLD: 2 % (ref 0–1)
BILIRUB SERPL-MCNC: 0.5 MG/DL (ref 0.2–1)
BILIRUB UR QL: NEGATIVE
BUN SERPL-MCNC: 12 MG/DL (ref 6–20)
BUN/CREAT SERPL: 16 (ref 12–20)
CALCIUM SERPL-MCNC: 9.8 MG/DL (ref 8.5–10.1)
CHLORIDE SERPL-SCNC: 102 MMOL/L (ref 97–108)
CHOLEST SERPL-MCNC: 210 MG/DL
CO2 SERPL-SCNC: 31 MMOL/L (ref 21–32)
COLOR UR: ABNORMAL
CREAT SERPL-MCNC: 0.74 MG/DL (ref 0.55–1.02)
DIFFERENTIAL METHOD BLD: ABNORMAL
EOSINOPHIL # BLD: 0.1 K/UL (ref 0–0.4)
EOSINOPHIL NFR BLD: 2 % (ref 0–7)
EPITH CASTS URNS QL MICRO: ABNORMAL /LPF
ERYTHROCYTE [DISTWIDTH] IN BLOOD BY AUTOMATED COUNT: 11.9 % (ref 11.5–14.5)
GLOBULIN SER CALC-MCNC: 2.8 G/DL (ref 2–4)
GLUCOSE SERPL-MCNC: 103 MG/DL (ref 65–100)
GLUCOSE UR STRIP.AUTO-MCNC: NEGATIVE MG/DL
HCT VFR BLD AUTO: 42 % (ref 35–47)
HDLC SERPL-MCNC: 70 MG/DL
HDLC SERPL: 3 {RATIO} (ref 0–5)
HGB BLD-MCNC: 13.7 G/DL (ref 11.5–16)
HGB UR QL STRIP: NEGATIVE
HYALINE CASTS URNS QL MICRO: ABNORMAL /LPF (ref 0–5)
IMM GRANULOCYTES # BLD AUTO: 0 K/UL (ref 0–0.04)
IMM GRANULOCYTES NFR BLD AUTO: 0 % (ref 0–0.5)
KETONES UR QL STRIP.AUTO: NEGATIVE MG/DL
LDLC SERPL CALC-MCNC: 110.4 MG/DL (ref 0–100)
LEUKOCYTE ESTERASE UR QL STRIP.AUTO: ABNORMAL
LYMPHOCYTES # BLD: 1.3 K/UL (ref 0.8–3.5)
LYMPHOCYTES NFR BLD: 26 % (ref 12–49)
MCH RBC QN AUTO: 32.5 PG (ref 26–34)
MCHC RBC AUTO-ENTMCNC: 32.6 G/DL (ref 30–36.5)
MCV RBC AUTO: 99.8 FL (ref 80–99)
MONOCYTES # BLD: 0.5 K/UL (ref 0–1)
MONOCYTES NFR BLD: 9 % (ref 5–13)
NEUTS SEG # BLD: 3.1 K/UL (ref 1.8–8)
NEUTS SEG NFR BLD: 61 % (ref 32–75)
NITRITE UR QL STRIP.AUTO: NEGATIVE
NRBC # BLD: 0 K/UL (ref 0–0.01)
NRBC BLD-RTO: 0 PER 100 WBC
PH UR STRIP: 7 [PH] (ref 5–8)
PLATELET # BLD AUTO: 352 K/UL (ref 150–400)
PMV BLD AUTO: 10 FL (ref 8.9–12.9)
POTASSIUM SERPL-SCNC: 4.9 MMOL/L (ref 3.5–5.1)
PROT SERPL-MCNC: 6.8 G/DL (ref 6.4–8.2)
PROT UR STRIP-MCNC: NEGATIVE MG/DL
RBC # BLD AUTO: 4.21 M/UL (ref 3.8–5.2)
RBC #/AREA URNS HPF: ABNORMAL /HPF (ref 0–5)
SODIUM SERPL-SCNC: 138 MMOL/L (ref 136–145)
SP GR UR REFRACTOMETRY: 1 (ref 1–1.03)
T4 FREE SERPL-MCNC: 1.2 NG/DL (ref 0.8–1.5)
TRIGL SERPL-MCNC: 148 MG/DL (ref ?–150)
TSH SERPL DL<=0.05 MIU/L-ACNC: 1.06 UIU/ML (ref 0.36–3.74)
UA: UC IF INDICATED,UAUC: ABNORMAL
UROBILINOGEN UR QL STRIP.AUTO: 0.2 EU/DL (ref 0.2–1)
VLDLC SERPL CALC-MCNC: 29.6 MG/DL
WBC # BLD AUTO: 5.1 K/UL (ref 3.6–11)
WBC URNS QL MICRO: ABNORMAL /HPF (ref 0–4)

## 2022-11-10 PROCEDURE — 1090F PRES/ABSN URINE INCON ASSESS: CPT | Performed by: INTERNAL MEDICINE

## 2022-11-10 PROCEDURE — G8399 PT W/DXA RESULTS DOCUMENT: HCPCS | Performed by: INTERNAL MEDICINE

## 2022-11-10 PROCEDURE — G8417 CALC BMI ABV UP PARAM F/U: HCPCS | Performed by: INTERNAL MEDICINE

## 2022-11-10 PROCEDURE — G9717 DOC PT DX DEP/BP F/U NT REQ: HCPCS | Performed by: INTERNAL MEDICINE

## 2022-11-10 PROCEDURE — G8754 DIAS BP LESS 90: HCPCS | Performed by: INTERNAL MEDICINE

## 2022-11-10 PROCEDURE — G0463 HOSPITAL OUTPT CLINIC VISIT: HCPCS | Performed by: INTERNAL MEDICINE

## 2022-11-10 PROCEDURE — 99213 OFFICE O/P EST LOW 20 MIN: CPT | Performed by: INTERNAL MEDICINE

## 2022-11-10 PROCEDURE — 3017F COLORECTAL CA SCREEN DOC REV: CPT | Performed by: INTERNAL MEDICINE

## 2022-11-10 PROCEDURE — G8536 NO DOC ELDER MAL SCRN: HCPCS | Performed by: INTERNAL MEDICINE

## 2022-11-10 PROCEDURE — 1101F PT FALLS ASSESS-DOCD LE1/YR: CPT | Performed by: INTERNAL MEDICINE

## 2022-11-10 PROCEDURE — G0439 PPPS, SUBSEQ VISIT: HCPCS | Performed by: INTERNAL MEDICINE

## 2022-11-10 PROCEDURE — G9899 SCRN MAM PERF RSLTS DOC: HCPCS | Performed by: INTERNAL MEDICINE

## 2022-11-10 PROCEDURE — G8427 DOCREV CUR MEDS BY ELIG CLIN: HCPCS | Performed by: INTERNAL MEDICINE

## 2022-11-10 PROCEDURE — G8752 SYS BP LESS 140: HCPCS | Performed by: INTERNAL MEDICINE

## 2022-11-10 NOTE — PROGRESS NOTES
Verified name and birth date for privacy precautions. Chart reviewed in preparation for today's visit. Chief Complaint   Patient presents with    Hypertension          Health Maintenance Due   Topic    Shingrix Vaccine Age 50> (1 of 2)    Flu Vaccine (1)    Medicare Yearly Exam          Wt Readings from Last 3 Encounters:   11/10/22 155 lb (70.3 kg)   08/27/22 154 lb 5.2 oz (70 kg)   04/11/22 151 lb (68.5 kg)     Temp Readings from Last 3 Encounters:   11/10/22 97.1 °F (36.2 °C) (Temporal)   08/27/22 98.2 °F (36.8 °C)   03/22/22 97.1 °F (36.2 °C) (Temporal)     BP Readings from Last 3 Encounters:   11/10/22 (!) 170/80   08/27/22 (!) 166/89   04/11/22 138/70     Pulse Readings from Last 3 Encounters:   11/10/22 (!) 52   08/27/22 66   04/11/22 67         Learning Assessment:  :     Learning Assessment 5/7/2019 3/13/2018 2/4/2015 1/28/2014 1/20/2014 1/10/2013   PRIMARY LEARNER Patient Patient Patient Patient Patient Patient   HIGHEST LEVEL OF EDUCATION - PRIMARY LEARNER  GRADUATED HIGH SCHOOL OR GED GRADUATED HIGH SCHOOL OR GED GRADUATED HIGH SCHOOL OR GED GRADUATED HIGH SCHOOL OR GED GRADUATED HIGH SCHOOL OR GED -   BARRIERS PRIMARY LEARNER NONE NONE NONE NONE NONE -   CO-LEARNER CAREGIVER No No No No No -   PRIMARY LANGUAGE ENGLISH ENGLISH ENGLISH ENGLISH ENGLISH ENGLISH    NEED - - No - - -   LEARNER PREFERENCE PRIMARY VIDEOS DEMONSTRATION LISTENING LISTENING DEMONSTRATION DEMONSTRATION     - - DEMONSTRATION DEMONSTRATION - -   LEARNING SPECIAL TOPICS - - no - - -   ANSWERED BY patient patient patient Tray Holbrook patient patient   RELATIONSHIP SELF SELF SELF SELF SELF SELF       Depression Screening:  :     3 most recent PHQ Screens 11/10/2022   PHQ Not Done -   Little interest or pleasure in doing things Not at all   Feeling down, depressed, irritable, or hopeless Not at all   Total Score PHQ 2 0       Fall Risk Assessment:  :     Fall Risk Assessment, last 12 mths 11/10/2022   Able to walk?  Yes Fall in past 12 months? 0   Do you feel unsteady? 0   Are you worried about falling 0       Abuse Screening:  :     Abuse Screening Questionnaire 11/10/2022 8/7/2020 3/15/2019 7/6/2018 9/11/2017 3/16/2015 3/18/2014   Do you ever feel afraid of your partner? N N N N N N N   Are you in a relationship with someone who physically or mentally threatens you? N N N N N N N   Is it safe for you to go home?  Alize Sanchez

## 2022-11-10 NOTE — PROGRESS NOTES
Assessment/Plan:     1. Benign hypertension  -controlled with diovan 160mg daily.     - CBC WITH AUTOMATED DIFF; Future  - METABOLIC PANEL, COMPREHENSIVE; Future  - LIPID PANEL; Future  - URINALYSIS W/ REFLEX CULTURE; Future  - URINALYSIS W/ REFLEX CULTURE  - LIPID PANEL  - METABOLIC PANEL, COMPREHENSIVE  - CBC WITH AUTOMATED DIFF    2. Mixed hyperlipidemia  -encouraged maintaining low fat diet.   -encouraged regular exercise.   -taking simvastatin 40mg daily    - CBC WITH AUTOMATED DIFF; Future  - METABOLIC PANEL, COMPREHENSIVE; Future  - LIPID PANEL; Future  - LIPID PANEL  - METABOLIC PANEL, COMPREHENSIVE  - CBC WITH AUTOMATED DIFF    3. Hypothyroidism, adult  -appears clinically euthyroid  -using levothroxyine 100mcg daily.     - TSH 3RD GENERATION; Future  - T4, FREE; Future  - T4, FREE  - TSH 3RD GENERATION    4. Depression, unspecified depression type  -mood controlled with use of simvastatin 40mg daily. No adjustments needed    5. Encounter for long-term (current) use of medications      6. Medicare annual wellness visit, subsequent  -completed today.   -[facility] decision maker reviewed with patient and updated. - DEPRESSION SCREEN ANNUAL    7. Screening for depression    - DEPRESSION SCREEN ANNUAL    8. Benign paroxysmal positional vertigo, unspecified laterality  -resolved with treatment with Dr. Jenny Fregoso    9. Cerebrovascular small vessel disease  -noted on MRI done 8/27/2022.   -encouraged her to reduce use of alcohol. She drinks 1-2 glasses of wine daily  -encouraged regular exercise.      Orders Placed This Encounter    ANNUAL DEPRESSION SCREEN 8-15 MIN    CBC WITH AUTOMATED DIFF     Standing Status:   Future     Number of Occurrences:   1     Standing Expiration Date:   02/35/0317    METABOLIC PANEL, COMPREHENSIVE     Standing Status:   Future     Number of Occurrences:   1     Standing Expiration Date:   11/10/2023    LIPID PANEL     Standing Status:   Future     Number of Occurrences:   1 Standing Expiration Date:   11/10/2023    TSH 3RD GENERATION     Standing Status:   Future     Number of Occurrences:   1     Standing Expiration Date:   11/10/2023    T4, FREE     Standing Status:   Future     Number of Occurrences:   1     Standing Expiration Date:   11/10/2023    URINALYSIS W/ REFLEX CULTURE     Standing Status:   Future     Number of Occurrences:   1     Standing Expiration Date:   11/10/2023            Follow-up Disposition:     Follow up in 6 months               Subjective: Meaghan Negron is a 74 y.o. female who presents today for her Medicare Wellness Exam and follow up of her hypertension, hyperlipidemia , hypothyroid, depression     Since last visit :  -saw Dr. Caitlin Rodriguez - for vertigo. Improved. Bpv. She states that he did a 'maneuver' in his office then 'tapped' on my head and the vertigo improved. -bp at home systolic 772'U. -has been having left knee pain. She reports it is bone on bone. Limits her exercise. She has also been gaining weight because she recently spent several weeks at [facility]. 'I ate everything'    -she has concerns about a MRI of her brain that showed ischemic small vessel disease. Patient Care Team:  -Dr. Jj Sullivan dermatology  -Dr. Marion Reynolds - benign ovarian cyst  -Dr. Patricia Goncalves - GI (retired)  -Dr. Jorge Torres, cardiology - elevated calcium   -Dr. Ricardo Carrillo, eye care. 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 Isaiah Gil, ortho.   -Dr. Caitlin Rodriguez, ENT  -Dr. Tiff Hopson, Gyn     Objective:      Wt Readings from Last 3 Encounters:   08/27/22 154 lb 5.2 oz (70 kg)   04/11/22 151 lb (68.5 kg)   03/22/22 153 lb 3.2 oz (69.5 kg)     BP Readings from Last 3 Encounters:   08/27/22 (!) 166/89   04/11/22 138/70   03/22/22 135/70     Visit Vitals  /72 (BP 1 Location: Left upper arm, BP Patient Position: Sitting, BP Cuff Size: Adult)   Pulse (!) 52   Temp 97.1 °F (36.2 °C) (Temporal)   Resp 16   Ht 5' 4\" (1.626 m)   Wt 155 lb (70.3 kg)   SpO2 96%   BMI 26.61 kg/m² General appearance: alert, cooperative, no distress, appears stated age  Head: Normocephalic, without obvious abnormality, atraumatic  Neck: supple, symmetrical, trachea midline, no adenopathy, no carotid bruit, and no JVD  Lungs: clear to auscultation bilaterally  Heart: regular rate and rhythm, S1, S2 normal, no murmur, click, rub or gallop  Abdomen: soft, non-tender. Bowel sounds normal. No masses,  no organomegaly  Extremities: extremities normal, atraumatic, no cyanosis or edema  Pulses: 2+ and symmetric              Disclaimer:  Return if symptoms worsen or fail to improve. Advised patient to call back or return to office if symptoms worsen/change/persist.     Discussed expected course/resolution/complications of diagnosis in detail with patient. Medication risks/benefits/costs/interactions/alternatives discussed with patient. Patient was given an after visit summary which includes diagnoses, current medications, & vitals. Patient expressed understanding with the diagnosis and plan. This is the Subsequent Medicare Annual Wellness Exam, performed 12 months or more after the Initial AWV or the last Subsequent AWV    I have reviewed the patient's medical history in detail and updated the computerized patient record. Assessment/Plan   Education and counseling provided:  Are appropriate based on today's review and evaluation    1. Medicare annual wellness visit, subsequent  -     [facility]  2. Benign hypertension  3. Mixed hyperlipidemia  4. Hypothyroidism, adult  5. Depression, unspecified depression type  6. Encounter for long-term (current) use of medications  7.  Screening for depression  -     DEPRESSION SCREEN ANNUAL       Depression Risk Factor Screening     3 most recent PHQ Screens 2/14/2022   PHQ Not Done -   Little interest or pleasure in doing things Not at all   Feeling down, depressed, irritable, or hopeless Not at all   Total Score PHQ 2 0       Alcohol & Drug Abuse Risk Screen    Do you average more than 1 drink per night or more than 7 drinks a week:  Yes    On any one occasion in the past three months have you have had more than 3 drinks containing alcohol:  No          Functional Ability and Level of Safety    Hearing: Hearing is good. Activities of Daily Living: The home contains: handrails  Patient does total self care      Ambulation: with no difficulty     Fall Risk:  Fall Risk Assessment, last 12 mths 10/26/2021   Able to walk? Yes   Fall in past 12 months? 0   Do you feel unsteady? -   Are you worried about falling -      Abuse Screen:  Patient is not abused       Cognitive Screening    Has your family/caregiver stated any concerns about your memory: no         Health Maintenance Due     Health Maintenance Due   Topic Date Due    Shingrix Vaccine Age 49> (1 of 2) Never done    COVID-19 Vaccine (4 - Booster for Ladene Daphney series) 12/25/2021    Flu Vaccine (1) 08/01/2022    Medicare Yearly Exam  09/29/2022       Patient Care Team   Patient Care Team:  Maria Teresa Stack MD as PCP - General (Internal Medicine Physician)  Maria Teresa Stack MD as PCP - [address] [place] [address] Provider  Maritza Herman MD as Physician (Orthopedic Surgery)  Álvaro Rodriguez MD as Physician (Pain Management)  Gina Garza MD as Physician (Ophthalmology)  Evelia Mohs, MD as Physician (Gynecology)  Pilar Parr MD (Cardiovascular Disease Physician)  Silas Solis MD as Physician (Gastroenterology)    History     Patient Active Problem List   Diagnosis Code    Hypothyroid E03.9    Depression F32. 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    Agatston CAC score 100-199 R93.1    H/O bone density study Z92.89    Advance directive discussed with patient Z71.89    Mass of right ovary N83.8    Thickened endometrium R93.89    Endometrial polyp N84.0    Endocervical polyp N84.1    Syncope R55     Past Medical History:   Diagnosis Date    Arthritis     lower back    Cancer ([address]     BCCAs removed - 6 on legs    Depression     Environmental allergies 12/7/2011    Fibrocystic breast 12/7/2011    GERD (gastroesophageal reflux disease)     Hiatal hernia 12/7/2011    HIGH BLOOD PRESSURE     not on meds    HTN (hypertension) 12/7/2011    Hypothyroid 12/7/2011    Nausea & vomiting     S/P hemorrhoidectomy 12/7/2011    S/P laparoscopic cholecystectomy 12/7/2011    S/p tibial fracture 12/7/2011    Tendonitis of wrist, left 12/2017    cortisone shot    Thyroid disorder       Past Surgical History:   Procedure Laterality Date    ENDOSCOPY, COLON, DIAGNOSTIC  11/2010    ([facility])-f/u 5 yrs. HX BREAST LUMPECTOMY      right - benign    HX CHOLECYSTECTOMY      HX GI      hemorrhoidectomy    HX GYN  03/2019    d/c for thickened endometrium    HX ORTHOPAEDIC      surgery for tibial plateau fx - took bone from hip/has hardware knee to ankle     Current Outpatient Medications   Medication Sig Dispense Refill    Synthroid 100 mcg tablet TAKE 1 TABLET DAILY BEFORE BREAKFAST 90 Tablet 1    NIFEdipine ER (PROCARDIA XL) 30 mg ER tablet Take 1 Tablet by mouth daily. 30 Tablet 0    FLUoxetine (PROzac) 20 mg capsule TAKE 1 CAPSULE DAILY 90 Capsule 3    simvastatin (ZOCOR) 40 mg tablet TAKE 1 TABLET NIGHTLY 90 Tablet 3    valsartan (DIOVAN) 160 mg tablet Take 1 Tablet by mouth daily. 90 Tablet 3    lifitegrast (Xiidra) 5 % dpet Administer 1 Drop to both eyes two (2) times a day. doxycycline (VIBRA-TABS) 100 mg tablet Take 100 mg by mouth two (2) times a day.       calcium carbonate (TUMS) 200 mg calcium (500 mg) chew Take 1 Tab by mouth daily. As needed      aspirin delayed-release 81 mg tablet Take  by mouth daily. coenzyme q10 300 mg cap Take 300 mg by mouth daily. LACTOBACILLUS ACIDOPHILUS (PROBIOTIC PO) Take 1 Cap by mouth daily. BOSWELLIA SURENDRA EXTRACT (BOSWELLIA SURENDRA XT, BULK,) Take  by mouth daily. Anti-inflammatory supplement. Takes one cap po daily. MULTIVITAMIN PO Take  by mouth daily. Takes one po daily. Allergies   Allergen Reactions    Adhesive Hives    Cephalexin Diarrhea    Ciprofloxacin Diarrhea    E-Mycin [Erythromycin] Other (comments)     GI upset    Sudafed [Pseudoephedrine Hcl] Palpitations     Patient is not allergic any more    Zinc Itching       Family History   Problem Relation Age of Onset    Alcohol abuse Mother         cirrhosis    Alcohol abuse Father     Other Father         cerebral hemorrhage    Cancer Brother         prostate    Heart Disease Brother 46        MI(age 46), bypass(66)    Diabetes Maternal Aunt     Hypertension Maternal Aunt     Cancer Maternal Uncle         COLON    Heart Disease Maternal Grandmother         MI    Cancer Cousin         BREAST    Anesth Problems Neg Hx      Social History     Tobacco Use    Smoking status: Never    Smokeless tobacco: Never   Substance Use Topics    Alcohol use:  Yes     Alcohol/week: 7.0 standard drinks     Types: 7 Glasses of wine per week     Comment: keznie Andre MD

## 2022-11-10 NOTE — PATIENT INSTRUCTIONS
Medicare Wellness Visit, Female     The best way to live healthy is to have a lifestyle where you eat a well-balanced diet, exercise regularly, limit alcohol use, and quit all forms of tobacco/nicotine, if applicable. Regular preventive services are another way to keep healthy. Preventive services (vaccines, screening tests, monitoring & exams) can help personalize your care plan, which helps you manage your own care. Screening tests can find health problems at the earliest stages, when they are easiest to treat. [facility] follows the current, evidence-based guidelines published by the [facility] Seth Larsen ([facility]) when recommending preventive services for our patients. Because we follow these guidelines, sometimes recommendations change over time as research supports it. (For example, mammograms used to be recommended annually. Even though Medicare will still pay for an annual mammogram, the newer guidelines recommend a mammogram every two years for women of average risk). Of course, you and your doctor may decide to screen more often for some diseases, based on your risk and your co-morbidities (chronic disease you are already diagnosed with). Preventive services for you include:  - Medicare offers their members a free annual wellness visit, which is time for you and your primary care provider to discuss and plan for your preventive service needs. Take advantage of this benefit every year!  -All adults over the age of 72 should receive the recommended pneumonia vaccines. Current USPSTF guidelines recommend a series of two vaccines for the best pneumonia protection.   -All adults should have a flu vaccine yearly and a tetanus vaccine every 10 years.   -All adults age 48 and older should receive the shingles vaccines (series of two vaccines).       -All adults age 38-68 who are overweight should have a diabetes screening test once every three years.   -All adults born between [birth year] and 1965 should be screened once for Hepatitis C.  -Other screening tests and preventive services for persons with diabetes include: an eye exam to screen for diabetic retinopathy, a kidney function test, a foot exam, and stricter control over your cholesterol.   -Cardiovascular screening for adults with routine risk involves an electrocardiogram (ECG) at intervals determined by your doctor.   -Colorectal cancer screenings should be done for adults age 54-65 with no increased risk factors for colorectal cancer. There are a number of acceptable methods of screening for this type of cancer. Each test has its own benefits and drawbacks. Discuss with your doctor what is most appropriate for you during your annual wellness visit. The different tests include: colonoscopy (considered the best screening method), a fecal occult blood test, a fecal DNA test, and sigmoidoscopy.    -A bone mass density test is recommended when a woman turns 65 to screen for osteoporosis. This test is only recommended one time, as a screening. Some providers will use this same test as a disease monitoring tool if you already have osteoporosis. -Breast cancer screenings are recommended every other year for women of normal risk, age 54-69.  -Cervical cancer screenings for women over age 72 are only recommended with certain risk factors.

## 2022-12-01 ENCOUNTER — OFFICE VISIT (OUTPATIENT)
Dept: CARDIOLOGY CLINIC | Age: 72
End: 2022-12-01
Payer: MEDICARE

## 2022-12-01 VITALS
WEIGHT: 152 LBS | OXYGEN SATURATION: 97 % | HEART RATE: 62 BPM | SYSTOLIC BLOOD PRESSURE: 130 MMHG | BODY MASS INDEX: 25.95 KG/M2 | RESPIRATION RATE: 16 BRPM | HEIGHT: 64 IN | DIASTOLIC BLOOD PRESSURE: 60 MMHG

## 2022-12-01 DIAGNOSIS — R93.1 AGATSTON CAC SCORE 100-199: ICD-10-CM

## 2022-12-01 DIAGNOSIS — E78.2 MIXED HYPERLIPIDEMIA: ICD-10-CM

## 2022-12-01 DIAGNOSIS — R55 SYNCOPE, UNSPECIFIED SYNCOPE TYPE: ICD-10-CM

## 2022-12-01 DIAGNOSIS — I10 PRIMARY HYPERTENSION: Primary | ICD-10-CM

## 2022-12-01 NOTE — PROGRESS NOTES
HISTORY OF PRESENT ILLNESS  Brendon Zhou is a 74 y.o. female     SUMMARY:   Problem List  Date Reviewed: 12/1/2022            Codes Class Noted    Endocervical polyp ICD-10-CM: N84.1  ICD-9-CM: 622.7  3/8/2019        Advance directive discussed with patient ICD-10-CM: Z71.89  ICD-9-CM: V65.49  3/13/2018    Overview Signed 3/13/2018 11:13 AM by Trevor Geronimo MD     3/13/2018 - -patient has an established AD. Will bring copy in for chart. H/O bone density study (Chronic) ICD-10-CM: Z92.89  ICD-9-CM: V15.89  9/9/2016    Overview Addendum 2/22/2022  5:20 PM by Trevor Geronimo MD     [facility],11/9/2021- osteopenia. Followed by Dr. Lilian Smiley CAC score 100-199 ICD-10-CM: R93.1  ICD-9-CM: 793.2  6/10/2016    Overview Signed 6/10/2016  8:24 AM by Flavio Boyer MD     5/16 score 188, 80th percentile             Benign neoplasm of choroid ICD-10-CM: D31.30  ICD-9-CM: 224.6  2/1/2016        Hyperlipidemia ICD-10-CM: E78.5  ICD-9-CM: 272.4  1/10/2013        Colon cancer screening (Chronic) ICD-10-CM: Z12.11  ICD-9-CM: V76.51  7/10/2012    Overview Addendum 3/26/2021  4:00 PM by Trevor Geronimo MD     1/15/20 Dr. Luz Marina Dolan polyps. Follow up in 5 years. History of screening mammography (Chronic) ICD-10-CM: F17.76  ICD-9-CM: V15.89  7/10/2012    Overview Addendum 2/22/2022  5:19 PM by Trevor Geronimo MD     [facility], 11/9/2021             Pap smear for cervical cancer screening (Chronic) ICD-10-CM: Z12.4  ICD-9-CM: V76.2  7/10/2012    Overview Addendum 3/10/2017 11:02 AM by Trevor Geronimo MD     6/16, [facility]             Hypothyroid ICD-10-CM: E03.9  ICD-9-CM: 244.9  12/7/2011        Depression ICD-10-CM: D13. A  ICD-9-CM: 814  12/7/2011        HTN (hypertension) ICD-10-CM: I10  ICD-9-CM: 401.9  12/7/2011        Fibrocystic breast ICD-10-CM: N60.19  ICD-9-CM: 610.1  12/7/2011        S/p tibial fracture ICD-10-CM: Z87.81  ICD-9-CM: V54.16 12/7/2011    Overview Signed 12/7/2011  2:17 PM by Joaquin Mo MD     Right with venkatesh placement, 1995             S/P hemorrhoidectomy ICD-10-CM: R32.353, Z87.19  ICD-9-CM: V45.89  12/7/2011    Overview Signed 12/7/2011  2:18 PM by MD Dr. Oriana Machado             Environmental allergies ICD-10-CM: Z91.09  ICD-9-CM: V15.09  12/7/2011        S/P laparoscopic cholecystectomy ICD-10-CM: N22.09  ICD-9-CM: V45.89  12/7/2011        Hiatal hernia ICD-10-CM: K44.9  ICD-9-CM: 553.3  12/7/2011    Overview Signed 12/7/2011  2:18 PM by Joaquin Mo MD     egd 11/9/10, Dr. Yumiko Yap Medications on File Prior to Visit   Medication Sig    Synthroid 100 mcg tablet TAKE 1 TABLET DAILY BEFORE BREAKFAST    FLUoxetine (PROzac) 20 mg capsule TAKE 1 CAPSULE DAILY    simvastatin (ZOCOR) 40 mg tablet TAKE 1 TABLET NIGHTLY    valsartan (DIOVAN) 160 mg tablet Take 1 Tablet by mouth daily. lifitegrast (Xiidra) 5 % dpet Administer 1 Drop to both eyes two (2) times a day. doxycycline (VIBRA-TABS) 100 mg tablet Take 100 mg by mouth two (2) times a day. calcium carbonate (TUMS) 200 mg calcium (500 mg) chew Take 1 Tab by mouth daily. As needed    aspirin delayed-release 81 mg tablet Take  by mouth daily. coenzyme q10 300 mg cap Take 300 mg by mouth daily. LACTOBACILLUS ACIDOPHILUS (PROBIOTIC PO) Take 1 Cap by mouth daily. BOSWELLIA SURENDRA EXTRACT (BOSWELLIA SURENDRA XT, BULK,) Take  by mouth daily. Anti-inflammatory supplement. Takes one cap po daily. MULTIVITAMIN PO Take  by mouth daily. Takes one po daily. No current facility-administered medications on file prior to visit.        CARDIOLOGY STUDIES TO DATE:  12/11 normal stress echo at [facility] (notes reviewed and summarized under media tab)    5/16 score 188, 80th percentile  6/17 normal stress echo  9/19 normal stress echo  11/21 mild mr, otherwise normal echo  11/21 carotid dopplers less than 50% bilateral stenoses    Chief Complaint   Patient presents with    Follow-up     HPI :  She is doing well and remains asymptomatic from a cardiac perspective. Still does not exercise at all has not lost any weight blood pressures well controlled. She had an episode of vertigo over the summer which took about 2 months to completely resolve. Most recent lipid profile did not look too good and that her LDL was up to about 110  CARDIAC ROS:   negative for chest pain, dyspnea, palpitations, syncope, orthopnea, paroxysmal nocturnal dyspnea, exertional chest pressure/discomfort, claudication, lower extremity edema    Family History   Problem Relation Age of Onset    Alcohol abuse Mother         cirrhosis    Alcohol abuse Father     Other Father         cerebral hemorrhage    Cancer Brother         prostate    Heart Disease Brother 46        MI(age 46), bypass(66)    Diabetes Maternal Aunt     Hypertension Maternal Aunt     Cancer Maternal Uncle         COLON    Heart Disease Maternal Grandmother         MI    Cancer Cousin         BREAST    Anesth Problems Neg Hx        Past Medical History:   Diagnosis Date    Arthritis     lower back    Cancer ([address]     BCCAs removed - 6 on legs    Depression     Environmental allergies 12/7/2011    Fibrocystic breast 12/7/2011    GERD (gastroesophageal reflux disease)     Hiatal hernia 12/7/2011    HIGH BLOOD PRESSURE     not on meds    HTN (hypertension) 12/7/2011    Hypothyroid 12/7/2011    Nausea & vomiting     S/P hemorrhoidectomy 12/7/2011    S/P laparoscopic cholecystectomy 12/7/2011    S/p tibial fracture 12/7/2011    Tendonitis of wrist, left 12/2017    cortisone shot    Thyroid disorder        GENERAL ROS:  A comprehensive review of systems was negative except for that written in the HPI.     Visit Vitals  /60   Pulse 62   Resp 16   Ht 5' 4\" (1.626 m)   Wt 152 lb (68.9 kg)   SpO2 97%   BMI 26.09 kg/m²       Wt Readings from Last 3 Encounters:   12/01/22 152 lb (68.9 kg)   11/10/22 155 lb (70.3 kg) 08/27/22 154 lb 5.2 oz (70 kg)            BP Readings from Last 3 Encounters:   12/01/22 130/60   11/10/22 125/72   08/27/22 (!) 166/89       PHYSICAL EXAM  General appearance: alert, cooperative, no distress, appears stated age  Neurologic: Alert and oriented X 3  Neck: supple, symmetrical, trachea midline, no adenopathy, no carotid bruit, and no JVD  Lungs: clear to auscultation bilaterally  Heart: regular rate and rhythm, S1, S2 normal, no murmur, click, rub or gallop  Extremities: extremities normal, atraumatic, no cyanosis or edema    Lab Results   Component Value Date/Time    Cholesterol, total 210 (H) 11/10/2022 10:32 AM    Cholesterol, total 206 (H) 03/22/2022 11:17 AM    Cholesterol, total 212 (H) 11/28/2021 01:43 AM    Cholesterol, total 204 (H) 09/28/2021 01:24 PM    Cholesterol, total 236 (H) 03/23/2021 10:32 AM    HDL Cholesterol 70 11/10/2022 10:32 AM    HDL Cholesterol 78 03/22/2022 11:17 AM    HDL Cholesterol 82 11/28/2021 01:43 AM    HDL Cholesterol 87 09/28/2021 01:24 PM    HDL Cholesterol 85 03/23/2021 10:32 AM    LDL, calculated 110.4 (H) 11/10/2022 10:32 AM    LDL, calculated 106.8 (H) 03/22/2022 11:17 AM    LDL, calculated 102.6 (H) 11/28/2021 01:43 AM    LDL, calculated 95.6 09/28/2021 01:24 PM    LDL, calculated 122.8 (H) 03/23/2021 10:32 AM    Triglyceride 148 11/10/2022 10:32 AM    Triglyceride 106 03/22/2022 11:17 AM    Triglyceride 137 11/28/2021 01:43 AM    Triglyceride 107 09/28/2021 01:24 PM    Triglyceride 141 03/23/2021 10:32 AM    CHOL/HDL Ratio 3.0 11/10/2022 10:32 AM    CHOL/HDL Ratio 2.6 03/22/2022 11:17 AM    CHOL/HDL Ratio 2.6 11/28/2021 01:43 AM    CHOL/HDL Ratio 2.3 09/28/2021 01:24 PM    CHOL/HDL Ratio 2.8 03/23/2021 10:32 AM     ASSESSMENT :      She is stable and asymptomatic at this point on a reasonable medical regimen and needs no cardiac testing.   I think if her LDL stays above 100 with her next reading we should switch her to a stronger statin like atorvastatin or Crestor  current treatment plan is effective, no change in therapy  lab results and schedule of future lab studies reviewed with patient  reviewed diet, exercise and weight control    Encounter Diagnoses   Name Primary? Primary hypertension Yes    Mixed hyperlipidemia     Agatston CAC score 100-199     Syncope, unspecified syncope type      No orders of the defined types were placed in this encounter. Follow-up and Dispositions    Return in about 6 months (around 6/1/2023). Monisha Dowling MD  12/1/2022  Please note that this dictation was completed with [employer], the [employer] voice recognition software. Quite often unanticipated grammatical, syntax, homophones, and other interpretive errors are inadvertently transcribed by the computer software. Please disregard these errors. Please excuse any errors that have escaped final proofreading. Thank you.

## 2023-03-13 ENCOUNTER — TELEPHONE (OUTPATIENT)
Dept: INTERNAL MEDICINE CLINIC | Age: 73
End: 2023-03-13

## 2023-06-20 ENCOUNTER — OFFICE VISIT (OUTPATIENT)
Age: 73
End: 2023-06-20
Payer: MEDICARE

## 2023-06-20 VITALS
OXYGEN SATURATION: 98 % | BODY MASS INDEX: 25.27 KG/M2 | DIASTOLIC BLOOD PRESSURE: 74 MMHG | HEIGHT: 64 IN | WEIGHT: 148 LBS | RESPIRATION RATE: 18 BRPM | HEART RATE: 54 BPM | TEMPERATURE: 98 F | SYSTOLIC BLOOD PRESSURE: 133 MMHG

## 2023-06-20 DIAGNOSIS — F32.A DEPRESSION, UNSPECIFIED DEPRESSION TYPE: ICD-10-CM

## 2023-06-20 DIAGNOSIS — E78.2 MIXED HYPERLIPIDEMIA: ICD-10-CM

## 2023-06-20 DIAGNOSIS — I10 ESSENTIAL (PRIMARY) HYPERTENSION: Primary | ICD-10-CM

## 2023-06-20 DIAGNOSIS — R73.01 IFG (IMPAIRED FASTING GLUCOSE): ICD-10-CM

## 2023-06-20 DIAGNOSIS — Z79.899 OTHER LONG TERM (CURRENT) DRUG THERAPY: ICD-10-CM

## 2023-06-20 DIAGNOSIS — E03.9 HYPOTHYROIDISM, UNSPECIFIED TYPE: ICD-10-CM

## 2023-06-20 DIAGNOSIS — I10 ESSENTIAL (PRIMARY) HYPERTENSION: ICD-10-CM

## 2023-06-20 LAB
ALBUMIN SERPL-MCNC: 4 G/DL (ref 3.5–5)
ALBUMIN/GLOB SERPL: 1.4 (ref 1.1–2.2)
ALP SERPL-CCNC: 94 U/L (ref 45–117)
ALT SERPL-CCNC: 27 U/L (ref 12–78)
ANION GAP SERPL CALC-SCNC: 6 MMOL/L (ref 5–15)
AST SERPL-CCNC: 18 U/L (ref 15–37)
BASOPHILS # BLD: 0.1 K/UL (ref 0–0.1)
BASOPHILS NFR BLD: 1 % (ref 0–1)
BILIRUB SERPL-MCNC: 0.6 MG/DL (ref 0.2–1)
BUN SERPL-MCNC: 14 MG/DL (ref 6–20)
BUN/CREAT SERPL: 21 (ref 12–20)
CALCIUM SERPL-MCNC: 10 MG/DL (ref 8.5–10.1)
CHLORIDE SERPL-SCNC: 106 MMOL/L (ref 97–108)
CHOLEST SERPL-MCNC: 199 MG/DL
CO2 SERPL-SCNC: 30 MMOL/L (ref 21–32)
CREAT SERPL-MCNC: 0.66 MG/DL (ref 0.55–1.02)
DIFFERENTIAL METHOD BLD: ABNORMAL
EOSINOPHIL # BLD: 0.2 K/UL (ref 0–0.4)
EOSINOPHIL NFR BLD: 3 % (ref 0–7)
ERYTHROCYTE [DISTWIDTH] IN BLOOD BY AUTOMATED COUNT: 12.4 % (ref 11.5–14.5)
EST. AVERAGE GLUCOSE BLD GHB EST-MCNC: 97 MG/DL
GLOBULIN SER CALC-MCNC: 2.8 G/DL (ref 2–4)
GLUCOSE SERPL-MCNC: 94 MG/DL (ref 65–100)
HBA1C MFR BLD: 5 % (ref 4–5.6)
HCT VFR BLD AUTO: 43.3 % (ref 35–47)
HDLC SERPL-MCNC: 78 MG/DL
HDLC SERPL: 2.6 (ref 0–5)
HGB BLD-MCNC: 14.1 G/DL (ref 11.5–16)
IMM GRANULOCYTES # BLD AUTO: 0 K/UL (ref 0–0.04)
IMM GRANULOCYTES NFR BLD AUTO: 0 % (ref 0–0.5)
LDLC SERPL CALC-MCNC: 94.2 MG/DL (ref 0–100)
LYMPHOCYTES # BLD: 1.3 K/UL (ref 0.8–3.5)
LYMPHOCYTES NFR BLD: 24 % (ref 12–49)
MCH RBC QN AUTO: 32.3 PG (ref 26–34)
MCHC RBC AUTO-ENTMCNC: 32.6 G/DL (ref 30–36.5)
MCV RBC AUTO: 99.3 FL (ref 80–99)
MONOCYTES # BLD: 0.6 K/UL (ref 0–1)
MONOCYTES NFR BLD: 12 % (ref 5–13)
NEUTS SEG # BLD: 3.1 K/UL (ref 1.8–8)
NEUTS SEG NFR BLD: 60 % (ref 32–75)
NRBC # BLD: 0 K/UL (ref 0–0.01)
NRBC BLD-RTO: 0 PER 100 WBC
PLATELET # BLD AUTO: 355 K/UL (ref 150–400)
PMV BLD AUTO: 10.2 FL (ref 8.9–12.9)
POTASSIUM SERPL-SCNC: 5.2 MMOL/L (ref 3.5–5.1)
PROT SERPL-MCNC: 6.8 G/DL (ref 6.4–8.2)
RBC # BLD AUTO: 4.36 M/UL (ref 3.8–5.2)
SODIUM SERPL-SCNC: 142 MMOL/L (ref 136–145)
T4 FREE SERPL-MCNC: 1.4 NG/DL (ref 0.8–1.5)
TRIGL SERPL-MCNC: 134 MG/DL
TSH SERPL DL<=0.05 MIU/L-ACNC: 0.68 UIU/ML (ref 0.36–3.74)
VLDLC SERPL CALC-MCNC: 26.8 MG/DL
WBC # BLD AUTO: 5.1 K/UL (ref 3.6–11)

## 2023-06-20 PROCEDURE — 1036F TOBACCO NON-USER: CPT | Performed by: INTERNAL MEDICINE

## 2023-06-20 PROCEDURE — 1123F ACP DISCUSS/DSCN MKR DOCD: CPT | Performed by: INTERNAL MEDICINE

## 2023-06-20 PROCEDURE — 1090F PRES/ABSN URINE INCON ASSESS: CPT | Performed by: INTERNAL MEDICINE

## 2023-06-20 PROCEDURE — 3078F DIAST BP <80 MM HG: CPT | Performed by: INTERNAL MEDICINE

## 2023-06-20 PROCEDURE — 99214 OFFICE O/P EST MOD 30 MIN: CPT | Performed by: INTERNAL MEDICINE

## 2023-06-20 PROCEDURE — 3017F COLORECTAL CA SCREEN DOC REV: CPT | Performed by: INTERNAL MEDICINE

## 2023-06-20 PROCEDURE — 3074F SYST BP LT 130 MM HG: CPT | Performed by: INTERNAL MEDICINE

## 2023-06-20 PROCEDURE — G8419 CALC BMI OUT NRM PARAM NOF/U: HCPCS | Performed by: INTERNAL MEDICINE

## 2023-06-20 PROCEDURE — G8427 DOCREV CUR MEDS BY ELIG CLIN: HCPCS | Performed by: INTERNAL MEDICINE

## 2023-06-20 PROCEDURE — G8400 PT W/DXA NO RESULTS DOC: HCPCS | Performed by: INTERNAL MEDICINE

## 2023-06-20 RX ORDER — VALSARTAN 160 MG/1
160 TABLET ORAL DAILY
Qty: 90 TABLET | Refills: 1 | Status: SHIPPED | OUTPATIENT
Start: 2023-06-20

## 2023-06-20 RX ORDER — FLUOXETINE HYDROCHLORIDE 20 MG/1
20 CAPSULE ORAL DAILY
Qty: 90 CAPSULE | Refills: 1 | Status: SHIPPED | OUTPATIENT
Start: 2023-06-20

## 2023-06-20 RX ORDER — LEVOTHYROXINE SODIUM 0.1 MG/1
TABLET ORAL
Qty: 90 TABLET | Refills: 1 | Status: SHIPPED | OUTPATIENT
Start: 2023-06-20

## 2023-06-20 RX ORDER — SIMVASTATIN 40 MG
40 TABLET ORAL NIGHTLY
Qty: 90 TABLET | Refills: 1 | Status: SHIPPED | OUTPATIENT
Start: 2023-06-20

## 2023-06-20 RX ORDER — FLUOCINONIDE TOPICAL SOLUTION USP, 0.05% 0.5 MG/ML
SOLUTION TOPICAL
COMMUNITY
Start: 2022-06-15

## 2023-06-20 SDOH — ECONOMIC STABILITY: FOOD INSECURITY: WITHIN THE PAST 12 MONTHS, YOU WORRIED THAT YOUR FOOD WOULD RUN OUT BEFORE YOU GOT MONEY TO BUY MORE.: NEVER TRUE

## 2023-06-20 SDOH — ECONOMIC STABILITY: INCOME INSECURITY: HOW HARD IS IT FOR YOU TO PAY FOR THE VERY BASICS LIKE FOOD, HOUSING, MEDICAL CARE, AND HEATING?: NOT HARD AT ALL

## 2023-06-20 SDOH — ECONOMIC STABILITY: FOOD INSECURITY: WITHIN THE PAST 12 MONTHS, THE FOOD YOU BOUGHT JUST DIDN'T LAST AND YOU DIDN'T HAVE MONEY TO GET MORE.: NEVER TRUE

## 2023-06-20 SDOH — ECONOMIC STABILITY: HOUSING INSECURITY
IN THE LAST 12 MONTHS, WAS THERE A TIME WHEN YOU DID NOT HAVE A STEADY PLACE TO SLEEP OR SLEPT IN A SHELTER (INCLUDING NOW)?: NO

## 2023-06-20 ASSESSMENT — PATIENT HEALTH QUESTIONNAIRE - PHQ9
SUM OF ALL RESPONSES TO PHQ QUESTIONS 1-9: 0
3. TROUBLE FALLING OR STAYING ASLEEP: 0
7. TROUBLE CONCENTRATING ON THINGS, SUCH AS READING THE NEWSPAPER OR WATCHING TELEVISION: 0
SUM OF ALL RESPONSES TO PHQ9 QUESTIONS 1 & 2: 0
8. MOVING OR SPEAKING SO SLOWLY THAT OTHER PEOPLE COULD HAVE NOTICED. OR THE OPPOSITE, BEING SO FIGETY OR RESTLESS THAT YOU HAVE BEEN MOVING AROUND A LOT MORE THAN USUAL: 0
5. POOR APPETITE OR OVEREATING: 0
SUM OF ALL RESPONSES TO PHQ QUESTIONS 1-9: 0
2. FEELING DOWN, DEPRESSED OR HOPELESS: 0
SUM OF ALL RESPONSES TO PHQ QUESTIONS 1-9: 0
SUM OF ALL RESPONSES TO PHQ QUESTIONS 1-9: 0
9. THOUGHTS THAT YOU WOULD BE BETTER OFF DEAD, OR OF HURTING YOURSELF: 0
4. FEELING TIRED OR HAVING LITTLE ENERGY: 0
1. LITTLE INTEREST OR PLEASURE IN DOING THINGS: 0
6. FEELING BAD ABOUT YOURSELF - OR THAT YOU ARE A FAILURE OR HAVE LET YOURSELF OR YOUR FAMILY DOWN: 0
10. IF YOU CHECKED OFF ANY PROBLEMS, HOW DIFFICULT HAVE THESE PROBLEMS MADE IT FOR YOU TO DO YOUR WORK, TAKE CARE OF THINGS AT HOME, OR GET ALONG WITH OTHER PEOPLE: 0

## 2023-06-20 NOTE — PROGRESS NOTES
Assessment/Plan:     1. Essential (primary) hypertension  -controlled with valsartan 160mg daily. -encouraged regular exercise and low salt diet. - CBC with Auto Differential; Future  - Comprehensive Metabolic Panel; Future    2. Mixed hyperlipidemia  -taking simvastatin 40mg daily. -need fasting lipid panel today    - CBC with Auto Differential; Future  - Comprehensive Metabolic Panel; Future  - Lipid Panel; Future    3. Hypothyroidism, unspecified type  -appears clinically euthyroid.  -taking levothyroxine 100mcg daily.     - TSH; Future  - T4, Free; Future    4. Depression, unspecified depression type  -mood is stable with prozac 20mg daily. No adjustments needed at this time. 5. IFG (impaired fasting glucose)  -encouraged diet low in sugar and carbohydrates. -encouraged regular exercise. - Comprehensive Metabolic Panel; Future  - Hemoglobin A1C; Future    6. Other long term (current) drug therapy    Orders Placed This Encounter    CBC with Auto Differential     Standing Status:   Future     Number of Occurrences:   1     Standing Expiration Date:   6/20/2024    Comprehensive Metabolic Panel     Standing Status:   Future     Number of Occurrences:   1     Standing Expiration Date:   6/20/2024    Hemoglobin A1C     Standing Status:   Future     Number of Occurrences:   1     Standing Expiration Date:   6/20/2024    TSH     Standing Status:   Future     Number of Occurrences:   1     Standing Expiration Date:   6/20/2024    T4, Free     Standing Status:   Future     Number of Occurrences:   1     Standing Expiration Date:   6/20/2024    Lipid Panel     Standing Status:   Future     Number of Occurrences:   1     Standing Expiration Date:   6/20/2024     Order Specific Question:   Is Patient Fasting?/# of Hours     Answer:   8     Order Specific Question:   Has the patient fasted?      Answer:   Yes    fluocinonide (LIDEX) 0.05 % external solution    FLUoxetine (PROZAC) 20 MG capsule     Sig: Take 1

## 2023-07-12 ENCOUNTER — OFFICE VISIT (OUTPATIENT)
Age: 73
End: 2023-07-12
Payer: MEDICARE

## 2023-07-12 VITALS
TEMPERATURE: 97.5 F | HEART RATE: 66 BPM | SYSTOLIC BLOOD PRESSURE: 132 MMHG | RESPIRATION RATE: 16 BRPM | WEIGHT: 151.2 LBS | BODY MASS INDEX: 25.81 KG/M2 | OXYGEN SATURATION: 98 % | DIASTOLIC BLOOD PRESSURE: 72 MMHG | HEIGHT: 64 IN

## 2023-07-12 VITALS
HEIGHT: 64 IN | SYSTOLIC BLOOD PRESSURE: 130 MMHG | BODY MASS INDEX: 25.44 KG/M2 | WEIGHT: 149 LBS | HEART RATE: 75 BPM | OXYGEN SATURATION: 95 % | DIASTOLIC BLOOD PRESSURE: 78 MMHG | RESPIRATION RATE: 16 BRPM

## 2023-07-12 DIAGNOSIS — I10 PRIMARY HYPERTENSION: ICD-10-CM

## 2023-07-12 DIAGNOSIS — R93.1 AGATSTON CAC SCORE 100-199: ICD-10-CM

## 2023-07-12 DIAGNOSIS — E78.2 MIXED HYPERLIPIDEMIA: Primary | ICD-10-CM

## 2023-07-12 DIAGNOSIS — M54.41 ACUTE RIGHT-SIDED LOW BACK PAIN WITH RIGHT-SIDED SCIATICA: Primary | ICD-10-CM

## 2023-07-12 DIAGNOSIS — Z82.49 FAMILY HISTORY OF ISCHEMIC HEART DISEASE AND OTHER DISEASES OF THE CIRCULATORY SYSTEM: ICD-10-CM

## 2023-07-12 PROCEDURE — 99213 OFFICE O/P EST LOW 20 MIN: CPT | Performed by: NURSE PRACTITIONER

## 2023-07-12 PROCEDURE — 3075F SYST BP GE 130 - 139MM HG: CPT | Performed by: NURSE PRACTITIONER

## 2023-07-12 PROCEDURE — 99213 OFFICE O/P EST LOW 20 MIN: CPT | Performed by: SPECIALIST

## 2023-07-12 PROCEDURE — G8427 DOCREV CUR MEDS BY ELIG CLIN: HCPCS | Performed by: NURSE PRACTITIONER

## 2023-07-12 PROCEDURE — G8400 PT W/DXA NO RESULTS DOC: HCPCS | Performed by: NURSE PRACTITIONER

## 2023-07-12 PROCEDURE — 1123F ACP DISCUSS/DSCN MKR DOCD: CPT | Performed by: NURSE PRACTITIONER

## 2023-07-12 PROCEDURE — 1036F TOBACCO NON-USER: CPT | Performed by: NURSE PRACTITIONER

## 2023-07-12 PROCEDURE — 3078F DIAST BP <80 MM HG: CPT | Performed by: NURSE PRACTITIONER

## 2023-07-12 PROCEDURE — 3017F COLORECTAL CA SCREEN DOC REV: CPT | Performed by: NURSE PRACTITIONER

## 2023-07-12 PROCEDURE — G8419 CALC BMI OUT NRM PARAM NOF/U: HCPCS | Performed by: NURSE PRACTITIONER

## 2023-07-12 PROCEDURE — 1090F PRES/ABSN URINE INCON ASSESS: CPT | Performed by: NURSE PRACTITIONER

## 2023-07-12 RX ORDER — METHYLPREDNISOLONE 4 MG/1
TABLET ORAL
Qty: 1 KIT | Refills: 0 | Status: SHIPPED | OUTPATIENT
Start: 2023-07-12 | End: 2023-07-18

## 2023-07-12 ASSESSMENT — ENCOUNTER SYMPTOMS: BACK PAIN: 1

## 2023-07-12 NOTE — PROGRESS NOTES
Samantha White (:  1950) is a 74 y.o. female,here for evaluation of the following chief complaint(s):  Back Pain        SUBJECTIVE/OBJECTIVE:    HPI:Patient reports right low back pain x 3 days. It sometimes radiates down right lateral thigh. She has tried ice, heat, and diclofenac with little relief. It is worse with straightening up and bending. She tried aleve with little relief. No known trauma or injury. Review of Systems   Musculoskeletal:  Positive for back pain. Physical Exam  Constitutional:       Appearance: Normal appearance. Musculoskeletal:      Lumbar back: Tenderness (right SI joint pain with palpation; radiating to right lateral thigh) present. Normal range of motion. Skin:     General: Skin is warm and dry. Neurological:      General: No focal deficit present. Mental Status: She is alert and oriented to person, place, and time. Psychiatric:         Mood and Affect: Mood normal.         Behavior: Behavior normal.        ASSESSMENT/PLAN:  1. Acute right-sided low back pain with right-sided sciatica  -     methylPREDNISolone (MEDROL DOSEPACK) 4 MG tablet; Take by mouth.  Take per dose pack instructions, Disp-1 kit, R-0Normal  -     [facility] - Physical Therapy at [facility] - Jonathan WEBSTER  Medrol and PT  Follow-up if no improvement    --GLENNA Mckeon NP

## 2023-07-12 NOTE — PROGRESS NOTES
HISTORY OF PRESENT ILLNESS  Agusto Jarvis is a 74 y.o. female     SUMMARY:   Patient Active Problem List   Diagnosis    H/O bone density study    Hypothyroid    S/p tibial fracture    Hiatal hernia    S/P hemorrhoidectomy    Hyperlipidemia    Advance directive discussed with patient    Depression    Colon cancer screening    Agatston CAC score 100-199    History of screening mammography    S/P laparoscopic cholecystectomy    Pap smear for cervical cancer screening    Environmental allergies    HTN (hypertension)    Fibrocystic breast    Benign neoplasm of choroid    Endocervical polyp       Current Outpatient Medications   Medication Sig Dispense Refill    methylPREDNISolone (MEDROL DOSEPACK) 4 MG tablet Take by mouth. Take per dose pack instructions 1 kit 0    FLUoxetine (PROZAC) 20 MG capsule Take 1 capsule by mouth daily 90 capsule 1    levothyroxine (SYNTHROID) 100 MCG tablet TAKE 1 TABLET DAILY BEFORE BREAKFAST 90 tablet 1    simvastatin (ZOCOR) 40 MG tablet Take 1 tablet by mouth nightly 90 tablet 1    valsartan (DIOVAN) 160 MG tablet Take 1 tablet by mouth daily 90 tablet 1    Multiple Vitamin (MULTIVITAMIN PO) Take by mouth daily      aspirin 81 MG EC tablet Take by mouth daily       No current facility-administered medications for this visit. CARDIOLOGY STUDIES TO DATE:  12/11 normal stress echo at [facility] (notes reviewed and summarized under media tab)    5/16 score 188, 80th percentile  6/17 normal stress echo  9/19 normal stress echo  11/21 mild mr, otherwise normal echo  11/21 carotid dopplers less than 50% bilateral stenoses    Chief Complaint   Patient presents with    1 Year Follow Up       HPI :  She is doing really well with no cardiac complaints or problems with her medications. They continue to travel regularly and plans for the summer at the beach. She has been a little more active and actually has lost some weight since we last met.   Recent lipid profile looked

## 2023-08-01 ENCOUNTER — HOSPITAL ENCOUNTER (OUTPATIENT)
Facility: HOSPITAL | Age: 73
Setting detail: RECURRING SERIES
Discharge: HOME | End: 2023-08-04
Payer: MEDICARE

## 2023-08-01 PROCEDURE — 97110 THERAPEUTIC EXERCISES: CPT | Performed by: PHYSICAL THERAPIST

## 2023-08-01 PROCEDURE — 97162 PT EVAL MOD COMPLEX 30 MIN: CPT | Performed by: PHYSICAL THERAPIST

## 2023-08-01 PROCEDURE — 97140 MANUAL THERAPY 1/> REGIONS: CPT | Performed by: PHYSICAL THERAPIST

## 2023-08-01 NOTE — THERAPY EVALUATION
established functional score where 100 = no disability      22 min [x]Eval - untimed                        Therapeutic Procedures: Tx Min Billable or 1:1 Min (if diff from Tx Min) Procedure, Rationale, Specifics   10  [id number] Manual Therapy (timed):  decrease pain, increase ROM, increase tissue extensibility, and decrease trigger points to improve patient's ability to progress to PLOF and address remaining functional goals. The manual therapy interventions were performed at a separate and distinct time from the therapeutic activities interventions . (see flow sheet as applicable)    Details if applicable:  inferior sacral mobs in prone, lower lumbar PA grade ii-III, MET for correction of pelvic alignment   15  77023 Therapeutic Exercise (timed):  increase ROM, strength, coordination, balance, and proprioception to improve patient's ability to progress to PLOF and address remaining functional goals. (see flow sheet as applicable)    Details if applicable:  est HEP   25     Total Total       [x]  Patient Education billed concurrently with other procedures   [x] Review HEP    [] Progressed/Changed HEP, detail:    [] Other detail:       Pain Level at end of session (0-10 scale): 1/10    Plan of Care / Statement of Necessity for Physical Therapy Services     Assessment / key information:  Pt is a pleasant 74year old female with chief complaint of SI jt pain. She presents with hip and core weakness, hip stiffness, and altered gait mechanics. She will benefit from skilled PT services to address physical limitations to help reduced pain and optimize function.      Evaluation Complexity:  History:  MEDIUM  Complexity : 1-2 comorbidities / personal factors will impact the outcome/ POC ; Examination:  MEDIUM Complexity : 3 Standardized tests and measures addressin body structure, function, activity limitation and / or participation in recreation  ;Presentation:  MEDIUM Complexity : Evolving with changing characteristics

## 2023-08-03 ENCOUNTER — HOSPITAL ENCOUNTER (OUTPATIENT)
Facility: HOSPITAL | Age: 73
Setting detail: RECURRING SERIES
Discharge: HOME | End: 2023-08-06
Payer: MEDICARE

## 2023-08-03 PROCEDURE — 97140 MANUAL THERAPY 1/> REGIONS: CPT | Performed by: PHYSICAL THERAPIST

## 2023-08-03 PROCEDURE — 97110 THERAPEUTIC EXERCISES: CPT | Performed by: PHYSICAL THERAPIST

## 2023-08-03 NOTE — PROGRESS NOTES
PHYSICAL THERAPY - MEDICARE DAILY TREATMENT NOTE (updated 3/23)      Date: 8/3/2023          Patient Name:  Foster Britton :  1950   Medical   Diagnosis:  Acute right-sided low back pain with right-sided sciatica [M54.41] Treatment Diagnosis:  M54.59  OTHER LOWER BACK PAIN    Referral Source:  [place], [place]* Insurance:   Payor: MEDICARE / Plan: MEDICARE PART A AND B / Product Type: *No Product type* /                     Patient  verified yes     Visit #   Current  / Total 2 Med nec   Time   In / Out 12:00 pm 12:57 pm   Total Treatment Time 57   Total Timed Codes 47   1:1 Treatment Time 30      [facility] BC Totals Reminder:  bill using total billable   min of TIMED therapeutic procedures and modalities. 8-22 min = 1 unit; 23-37 min = 2 units; 38-52 min = 3 units; 53-67 min = 4 units; 68-82 min = 5 units            SUBJECTIVE    Pain Level (0-10 scale): sore    Any medication changes, allergies to medications, adverse drug reactions, diagnosis change, or new procedure performed?: [x] No    [] Yes (see summary sheet for update)  Medications: Verified on Patient Summary List    Subjective functional status/changes:     Pt reports lateral R hip is sore, but no sacral pain this morning. OBJECTIVE      Therapeutic Procedures: Tx Min Billable or 1:1 Min (if diff from Tx Min) Procedure, Rationale, Specifics   12 12 60137 Manual Therapy (timed):  decrease pain, increase ROM, increase tissue extensibility, and decrease trigger points to improve patient's ability to progress to PLOF and address remaining functional goals. The manual therapy interventions were performed at a separate and distinct time from the therapeutic activities interventions .  (see flow sheet as applicable)     Details if applicable:  prone sacral mobilizations, STM to glue attachments to sacrum in prone   15 0 [id number] Therapeutic Exercise (timed):  increase ROM, strength, coordination, balance, and proprioception to improve

## 2023-08-07 ENCOUNTER — HOSPITAL ENCOUNTER (OUTPATIENT)
Facility: HOSPITAL | Age: 73
Setting detail: RECURRING SERIES
Discharge: HOME | End: 2023-08-10
Payer: MEDICARE

## 2023-08-07 PROCEDURE — 97112 NEUROMUSCULAR REEDUCATION: CPT | Performed by: PHYSICAL THERAPIST

## 2023-08-07 PROCEDURE — 97140 MANUAL THERAPY 1/> REGIONS: CPT | Performed by: PHYSICAL THERAPIST

## 2023-08-07 NOTE — PROGRESS NOTES
PHYSICAL THERAPY - MEDICARE DAILY TREATMENT NOTE (updated 3/23)      Date: 2023          Patient Name:  Charis Caraballo :  1950   Medical   Diagnosis:  Acute right-sided low back pain with right-sided sciatica [M54.41] Treatment Diagnosis:  M54.59  OTHER LOWER BACK PAIN    Referral Source:  [place], [place]* Insurance:   Payor: MEDICARE / Plan: MEDICARE PART A AND B / Product Type: *No Product type* /                     Patient  verified yes     Visit #   Current  / Total 3 Med nec   Time   In / Out 235 pm 330 pm   Total Treatment Time 55   Total Timed Codes 45   1:1 Treatment Time 30      [facility] BC Totals Reminder:  bill using total billable   min of TIMED therapeutic procedures and modalities. 8-22 min = 1 unit; 23-37 min = 2 units; 38-52 min = 3 units; 53-67 min = 4 units; 68-82 min = 5 units            SUBJECTIVE    Pain Level (0-10 scale): sore    Any medication changes, allergies to medications, adverse drug reactions, diagnosis change, or new procedure performed?: [x] No    [] Yes (see summary sheet for update)  Medications: Verified on Patient Summary List    Subjective functional status/changes:     Pt reports that overall pain is better, but continues to have soreness on R side low back/SI. OBJECTIVE      Therapeutic Procedures: Tx Min Billable or 1:1 Min (if diff from Tx Min) Procedure, Rationale, Specifics   10 09 [id number] Manual Therapy (timed):  decrease pain, increase ROM, increase tissue extensibility, and decrease trigger points to improve patient's ability to progress to PLOF and address remaining functional goals. The manual therapy interventions were performed at a separate and distinct time from the therapeutic activities interventions .  (see flow sheet as applicable)     Details if applicable:  prone sacral mobilizations, STM to glue attachments to sacrum in prone   15 10 22689 Therapeutic Exercise (timed):  increase ROM, strength, coordination, balance, and

## 2023-08-09 ENCOUNTER — HOSPITAL ENCOUNTER (OUTPATIENT)
Facility: HOSPITAL | Age: 73
Setting detail: RECURRING SERIES
Discharge: HOME | End: 2023-08-12
Payer: MEDICARE

## 2023-08-09 PROCEDURE — 97110 THERAPEUTIC EXERCISES: CPT | Performed by: PHYSICAL THERAPIST

## 2023-08-09 PROCEDURE — 97112 NEUROMUSCULAR REEDUCATION: CPT | Performed by: PHYSICAL THERAPIST

## 2023-08-09 PROCEDURE — 97140 MANUAL THERAPY 1/> REGIONS: CPT | Performed by: PHYSICAL THERAPIST

## 2023-08-09 NOTE — PROGRESS NOTES
PHYSICAL THERAPY - MEDICARE DAILY TREATMENT NOTE (updated 3/23)      Date: 2023          Patient Name:  Lucretia Alberto :  1950   Medical   Diagnosis:  Acute right-sided low back pain with right-sided sciatica [M54.41] Treatment Diagnosis:  M54.59  OTHER LOWER BACK PAIN    Referral Source:  [place], [place]* Insurance:   Payor: MEDICARE / Plan: MEDICARE PART A AND B / Product Type: *No Product type* /                     Patient  verified yes     Visit #   Current  / Total 4 Med nec   Time   In / Out 1000 am 1105 am am   Total Treatment Time 65   Total Timed Codes 55   1:1 Treatment Time 40      [facility] BC Totals Reminder:  bill using total billable   min of TIMED therapeutic procedures and modalities. 8-22 min = 1 unit; 23-37 min = 2 units; 38-52 min = 3 units; 53-67 min = 4 units; 68-82 min = 5 units            SUBJECTIVE    Pain Level (0-10 scale): sore    Any medication changes, allergies to medications, adverse drug reactions, diagnosis change, or new procedure performed?: [x] No    [] Yes (see summary sheet for update)  Medications: Verified on Patient Summary List    Subjective functional status/changes:     Pt reports that she woke in the middle of the night with pain in lateral hips after lying on them. OBJECTIVE      Therapeutic Procedures: Tx Min Billable or 1:1 Min (if diff from Tx Min) Procedure, Rationale, Specifics   95 05 43069 Manual Therapy (timed):  decrease pain, increase ROM, increase tissue extensibility, and decrease trigger points to improve patient's ability to progress to PLOF and address remaining functional goals. The manual therapy interventions were performed at a separate and distinct time from the therapeutic activities interventions .  (see flow sheet as applicable)     Details if applicable:  STM to glute attachments to sacrum in prone, S/L STM to IT band bilaterally   25 10 19561 Therapeutic Exercise (timed):  increase ROM, strength, coordination, balance,

## 2023-08-17 ENCOUNTER — HOSPITAL ENCOUNTER (OUTPATIENT)
Facility: HOSPITAL | Age: 73
Setting detail: RECURRING SERIES
Discharge: HOME | End: 2023-08-20
Payer: MEDICARE

## 2023-08-17 PROCEDURE — 97112 NEUROMUSCULAR REEDUCATION: CPT | Performed by: PHYSICAL THERAPIST

## 2023-08-17 PROCEDURE — 97140 MANUAL THERAPY 1/> REGIONS: CPT | Performed by: PHYSICAL THERAPIST

## 2023-08-17 PROCEDURE — 97110 THERAPEUTIC EXERCISES: CPT | Performed by: PHYSICAL THERAPIST

## 2023-08-17 NOTE — PROGRESS NOTES
PHYSICAL THERAPY - MEDICARE DAILY TREATMENT NOTE (updated 3/23)      Date: 2023          Patient Name:  Rupali Leal :  1950   Medical   Diagnosis:  Acute right-sided low back pain with right-sided sciatica [M54.41] Treatment Diagnosis:  M54.59  OTHER LOWER BACK PAIN    Referral Source:  [place], [place]* Insurance:   Payor: MEDICARE / Plan: MEDICARE PART A AND B / Product Type: *No Product type* /                     Patient  verified yes     Visit #   Current  / Total 5 Med nec   Time   In / Out 1126 am 1240 pm   Total Treatment Time 74   Total Timed Codes 59   1:1 Treatment Time 45      [facility] BC Totals Reminder:  bill using total billable   min of TIMED therapeutic procedures and modalities. 8-22 min = 1 unit; 23-37 min = 2 units; 38-52 min = 3 units; 53-67 min = 4 units; 68-82 min = 5 units            SUBJECTIVE    Pain Level (0-10 scale): 2-3/10    Any medication changes, allergies to medications, adverse drug reactions, diagnosis change, or new procedure performed?: [x] No    [] Yes (see summary sheet for update)  Medications: Verified on Patient Summary List    Subjective functional status/changes:     Pt reports that she has been out of town to [facility] and     OBJECTIVE      Therapeutic Procedures: Tx Min Billable or 1:1 Min (if diff from Tx Min) Procedure, Rationale, Specifics   15 [phone] [id number] Manual Therapy (timed):  decrease pain, increase ROM, increase tissue extensibility, and decrease trigger points to improve patient's ability to progress to PLOF and address remaining functional goals. The manual therapy interventions were performed at a separate and distinct time from the therapeutic activities interventions .  (see flow sheet as applicable)     Details if applicable:  STM to R glute attachments to sacrum and piriformis in prone,   Held - S/L STM to IT band bilaterally   44 13 [id number] Therapeutic Exercise (timed):  increase ROM, strength, coordination, balance, and

## 2023-08-21 ENCOUNTER — HOSPITAL ENCOUNTER (OUTPATIENT)
Facility: HOSPITAL | Age: 73
Setting detail: RECURRING SERIES
Discharge: HOME | End: 2023-08-24
Payer: MEDICARE

## 2023-08-21 PROCEDURE — 97140 MANUAL THERAPY 1/> REGIONS: CPT | Performed by: PHYSICAL THERAPIST

## 2023-08-21 PROCEDURE — 97112 NEUROMUSCULAR REEDUCATION: CPT | Performed by: PHYSICAL THERAPIST

## 2023-08-21 NOTE — PROGRESS NOTES
detail:    [] Other detail:         Other Objective/Functional Measures  Reduced TTP along IT band following manual interventions. Addition of core press with pt noting mild LBP when core not engaged, but no discomfort when actively engaging abdominals. Pain Level at end of session (0-10 scale):0/10      Assessment   Pt participated well with skilled PT services progressing with strengthening activities. Patient will continue to benefit from skilled PT / OT services to modify and progress therapeutic interventions, analyze and address functional mobility deficits, analyze and address ROM deficits, analyze and address strength deficits, analyze and address soft tissue restrictions, analyze and cue for proper movement patterns, analyze and modify for postural abnormalities, and instruct in home and community integration to address functional deficits and attain remaining goals. Progress toward goals / Updated goals:  []  See Progress Note/Recertification    Short Term Goals: To be accomplished in 4-6 weeks  Pt will be ind with HEP and compliant with PT attendance. Pt will maintain pelvic alignment. Long Term Goals: To be accomplished in 12 weeks  Pt will be able to walk 1 mile without increased pain. Pt will improve FOTO score by 8 points for functional improvement. Pt will be able to sleep on sides without waking from pain. Pt will be able to return to upright position from bending over without increased pain.        PLAN  Yes  Continue plan of care  Re-Cert Due: 36/8/59  []  Upgrade activities as tolerated  []  Discharge due to :  []  Other:      Anirudh Garcia, PT       8/21/2023       1:08 PM

## 2023-08-24 ENCOUNTER — HOSPITAL ENCOUNTER (OUTPATIENT)
Facility: HOSPITAL | Age: 73
Setting detail: RECURRING SERIES
Discharge: HOME | End: 2023-08-27
Payer: MEDICARE

## 2023-08-24 PROCEDURE — 97140 MANUAL THERAPY 1/> REGIONS: CPT | Performed by: PHYSICAL THERAPIST

## 2023-08-24 PROCEDURE — 97112 NEUROMUSCULAR REEDUCATION: CPT | Performed by: PHYSICAL THERAPIST

## 2023-08-24 PROCEDURE — 97110 THERAPEUTIC EXERCISES: CPT | Performed by: PHYSICAL THERAPIST

## 2023-08-24 NOTE — PROGRESS NOTES
with core press    Pain Level at end of session (0-10 scale):0/10      Assessment   Pt participated well with skilled PT services denying lori discomfort along bilateral IT bands. Patient will continue to benefit from skilled PT / OT services to modify and progress therapeutic interventions, analyze and address functional mobility deficits, analyze and address ROM deficits, analyze and address strength deficits, analyze and address soft tissue restrictions, analyze and cue for proper movement patterns, analyze and modify for postural abnormalities, and instruct in home and community integration to address functional deficits and attain remaining goals. Progress toward goals / Updated goals:  []  See Progress Note/Recertification    Short Term Goals: To be accomplished in 4-6 weeks  Pt will be ind with HEP and compliant with PT attendance. Pt will maintain pelvic alignment. Long Term Goals: To be accomplished in 12 weeks  Pt will be able to walk 1 mile without increased pain. Pt will improve FOTO score by 8 points for functional improvement. Pt will be able to sleep on sides without waking from pain. Pt will be able to return to upright position from bending over without increased pain.        PLAN  Yes  Continue plan of care  Re-Cert Due: 38/8/28  []  Upgrade activities as tolerated  []  Discharge due to :  []  Other:      Sesar Dunn PT       8/24/2023       12:00 PM

## 2023-08-28 ENCOUNTER — HOSPITAL ENCOUNTER (OUTPATIENT)
Facility: HOSPITAL | Age: 73
Setting detail: RECURRING SERIES
Discharge: HOME | End: 2023-08-31
Payer: MEDICARE

## 2023-08-28 PROCEDURE — 97140 MANUAL THERAPY 1/> REGIONS: CPT | Performed by: PHYSICAL THERAPIST

## 2023-08-28 PROCEDURE — 97110 THERAPEUTIC EXERCISES: CPT | Performed by: PHYSICAL THERAPIST

## 2023-08-28 PROCEDURE — 97112 NEUROMUSCULAR REEDUCATION: CPT | Performed by: PHYSICAL THERAPIST

## 2023-08-28 NOTE — PROGRESS NOTES
PHYSICAL THERAPY - MEDICARE DAILY TREATMENT NOTE (updated 3/23)      Date: 2023          Patient Name:  Rehana Putnam :  1950   Medical   Diagnosis:  Acute right-sided low back pain with right-sided sciatica [M54.41] Treatment Diagnosis:  M54.59  OTHER LOWER BACK PAIN    Referral Source:  [place], [place]* Insurance:   Payor: MEDICARE / Plan: MEDICARE PART A AND B / Product Type: *No Product type* /                     Patient  verified yes     Visit #   Current  / Total 8 Med nec   Time   In / Out 1155 am 1255 pm   Total Treatment Time 60 min   Total Timed Codes 50 min   1:1 Treatment Time 38 min      [facility] Totals Reminder:  bill using total billable   min of TIMED therapeutic procedures and modalities. 8-22 min = 1 unit; 23-37 min = 2 units; 38-52 min = 3 units; 53-67 min = 4 units; 68-82 min = 5 units            SUBJECTIVE    Pain Level (0-10 scale): 0/10 at rest, 3/10 low back with pressure on it    Any medication changes, allergies to medications, adverse drug reactions, diagnosis change, or new procedure performed?: [x] No    [] Yes (see summary sheet for update)  Medications: Verified on Patient Summary List    Subjective functional status/changes:     Pt reports having some L shoulder pain since previous visit and notes it may have been core press exercise that bothered her shoulder. .     OBJECTIVE      Therapeutic Procedures: Tx Min Billable or 1:1 Min (if diff from Tx Min) Procedure, Rationale, Specifics   10 10 46724 Manual Therapy (timed):  decrease pain, increase ROM, increase tissue extensibility, and decrease trigger points to improve patient's ability to progress to PLOF and address remaining functional goals. The manual therapy interventions were performed at a separate and distinct time from the therapeutic activities interventions .  (see flow sheet as applicable)     Details if applicable:    STM to R glute attachments to sacrum and piriformis in prone,   Held - S/L

## 2023-08-30 ENCOUNTER — HOSPITAL ENCOUNTER (OUTPATIENT)
Facility: HOSPITAL | Age: 73
Setting detail: RECURRING SERIES
Discharge: HOME | End: 2023-09-02
Payer: MEDICARE

## 2023-08-30 PROCEDURE — 97112 NEUROMUSCULAR REEDUCATION: CPT | Performed by: PHYSICAL THERAPIST

## 2023-08-30 PROCEDURE — 97110 THERAPEUTIC EXERCISES: CPT | Performed by: PHYSICAL THERAPIST

## 2023-08-30 PROCEDURE — 97140 MANUAL THERAPY 1/> REGIONS: CPT | Performed by: PHYSICAL THERAPIST

## 2023-08-30 NOTE — PROGRESS NOTES
Physical Therapy at [facility],   a part of [address] [place] [address] Eli Denny, Uriah S Saray Hylton  Phone: [phone]  Fax: [phone]     PHYSICAL THERAPY PROGRESS NOTE  Patient Name:  Miguel Martin :  1950   Treatment/Medical Diagnosis: Acute right-sided low back pain with right-sided sciatica [M54.41]   [facility]     Date of Initial Visit:  23 Attended Visits:  9 Missed Visits:  0       Assessment      Pt has completed 9 skilled PT sessions making improvements with LE strength and overall pain levels. Continues to have TTP to R gluteals and weakness in hip abductors. She has met 2/2 STG and  1/4 LTG at this time. Functionally pt reports being able to return to upright position from forward flexed position without increased pain and able to walk without pain. Patient will continue to benefit from skilled PT / OT services to modify and progress therapeutic interventions, analyze and address functional mobility deficits, analyze and address ROM deficits, analyze and address strength deficits, analyze and address soft tissue restrictions, analyze and cue for proper movement patterns, analyze and modify for postural abnormalities, and instruct in home and community integration to address functional deficits and attain remaining goals. Progress toward goals / Updated goals:  []  See Progress Note/Recertification    Short Term Goals: To be accomplished in 4-6 weeks  Pt will be ind with HEP and compliant with PT attendance. Met  Pt will maintain pelvic alignment. Met     Long Term Goals: To be accomplished in 12 weeks  Pt will be able to walk 1 mile without increased pain. progressing  Pt will improve FOTO score by 8 points for functional improvement. Not Met  Pt will be able to sleep on sides without waking from pain. Not met  Pt will be able to return to upright position from bending over without increased pain.  Met    SUMMARY OF

## 2023-08-30 NOTE — PROGRESS NOTES
PHYSICAL THERAPY - MEDICARE DAILY TREATMENT NOTE (updated 3/23)      Date: 2023          Patient Name:  Aliyah Shafer :  1950   Medical   Diagnosis:  Acute right-sided low back pain with right-sided sciatica [M54.41] Treatment Diagnosis:  M54.59  OTHER LOWER BACK PAIN    Referral Source:  [place], [place]* Insurance:   Payor: MEDICARE / Plan: MEDICARE PART A AND B / Product Type: *No Product type* /                     Patient  verified yes     Visit #   Current  / Total 9 Med nec   Time   In / Out 100 pm 205 pm   Total Treatment Time 65 min   Total Timed Codes 55 min   1:1 Treatment Time 38 min      [facility] Totals Reminder:  bill using total billable   min of TIMED therapeutic procedures and modalities. 8-22 min = 1 unit; 23-37 min = 2 units; 38-52 min = 3 units; 53-67 min = 4 units; 68-82 min = 5 units            SUBJECTIVE    Pain Level (0-10 scale): 0/10 at rest, 4/10 low back with pressure on it    Any medication changes, allergies to medications, adverse drug reactions, diagnosis change, or new procedure performed?: [x] No    [] Yes (see summary sheet for update)  Medications: Verified on Patient Summary List    Subjective functional status/changes:     Pt reports since beginning PT services she has seen a 60% improvement. She continues to have discomfort on R posterior buttocks when any pressure is applied. She cannot sleep on R side due to this. OBJECTIVE      Therapeutic Procedures: Tx Min Billable or 1:1 Min (if diff from Tx Min) Procedure, Rationale, Specifics   10 10 26875 Manual Therapy (timed):  decrease pain, increase ROM, increase tissue extensibility, and decrease trigger points to improve patient's ability to progress to PLOF and address remaining functional goals. The manual therapy interventions were performed at a separate and distinct time from the therapeutic activities interventions .  (see flow sheet as applicable)     Details if applicable:    STM to GUSTAVO cespedes

## 2023-09-06 ENCOUNTER — HOSPITAL ENCOUNTER (OUTPATIENT)
Facility: HOSPITAL | Age: 73
Setting detail: RECURRING SERIES
Discharge: HOME | End: 2023-09-09
Payer: MEDICARE

## 2023-09-06 PROCEDURE — 97140 MANUAL THERAPY 1/> REGIONS: CPT | Performed by: PHYSICAL THERAPIST

## 2023-09-06 PROCEDURE — 97112 NEUROMUSCULAR REEDUCATION: CPT | Performed by: PHYSICAL THERAPIST

## 2023-09-06 PROCEDURE — 97110 THERAPEUTIC EXERCISES: CPT | Performed by: PHYSICAL THERAPIST

## 2023-09-06 NOTE — PROGRESS NOTES
PHYSICAL THERAPY - MEDICARE DAILY TREATMENT NOTE (updated 3/23)      Date: 2023          Patient Name:  Jennifer Centeno :  1950   Medical   Diagnosis:  Acute right-sided low back pain with right-sided sciatica [M54.41] Treatment Diagnosis:  M54.59  OTHER LOWER BACK PAIN    Referral Source:  Kailash [place], [place]* Insurance:   Payor: MEDICARE / Plan: MEDICARE PART A AND B / Product Type: *No Product type* /                     Patient  verified yes     Visit #   Current  / Total 10 Med nec   Time   In / Out 102 pm 200 pm   Total Treatment Time 58 min   Total Timed Codes 48 min   1:1 Treatment Time 48 min      [facility] Totals Reminder:  bill using total billable   min of TIMED therapeutic procedures and modalities. 8-22 min = 1 unit; 23-37 min = 2 units; 38-52 min = 3 units; 53-67 min = 4 units; 68-82 min = 5 units            SUBJECTIVE    Pain Level (0-10 scale): 0/10 at rest, 4/10 low back with pressure on it    Any medication changes, allergies to medications, adverse drug reactions, diagnosis change, or new procedure performed?: [x] No    [] Yes (see summary sheet for update)  Medications: Verified on Patient Summary List    Subjective functional status/changes:     Pt reports she had pain moving around in hip yesterday, but only has pain with pressure on it today. OBJECTIVE      Therapeutic Procedures: Tx Min Billable or 1:1 Min (if diff from Tx Min) Procedure, Rationale, Specifics   10  19692 Manual Therapy (timed):  decrease pain, increase ROM, increase tissue extensibility, and decrease trigger points to improve patient's ability to progress to PLOF and address remaining functional goals. The manual therapy interventions were performed at a separate and distinct time from the therapeutic activities interventions .  (see flow sheet as applicable)     Details if applicable:    STM to R glute attachments to sacrum and piriformis in prone   25  99297 Therapeutic Exercise (timed):  increase

## 2023-09-08 ENCOUNTER — HOSPITAL ENCOUNTER (OUTPATIENT)
Facility: HOSPITAL | Age: 73
Setting detail: RECURRING SERIES
Discharge: HOME | End: 2023-09-11
Payer: MEDICARE

## 2023-09-08 PROCEDURE — 97112 NEUROMUSCULAR REEDUCATION: CPT | Performed by: PHYSICAL THERAPIST

## 2023-09-08 PROCEDURE — 97140 MANUAL THERAPY 1/> REGIONS: CPT | Performed by: PHYSICAL THERAPIST

## 2023-09-08 PROCEDURE — 97110 THERAPEUTIC EXERCISES: CPT | Performed by: PHYSICAL THERAPIST

## 2023-09-08 NOTE — PROGRESS NOTES
Pt participated well with skilled PT services noting significant hip abd fatigue with sidestepping. Patient will continue to benefit from skilled PT / OT services to modify and progress therapeutic interventions, analyze and address functional mobility deficits, analyze and address ROM deficits, analyze and address strength deficits, analyze and address soft tissue restrictions, analyze and cue for proper movement patterns, analyze and modify for postural abnormalities, and instruct in home and community integration to address functional deficits and attain remaining goals. Progress toward goals / Updated goals:  [x]  See Progress Note/Recertification   Short Term Goals: To be accomplished in 4-6 weeks  Pt will be ind with HEP and compliant with PT attendance. Met  Pt will maintain pelvic alignment. Met     Long Term Goals: To be accomplished in 12 weeks  Pt will be able to walk 1 mile without increased pain. progressing  Pt will improve FOTO score by 8 points for functional improvement. Not Met  Pt will be able to sleep on sides without waking from pain. Not met  Pt will be able to return to upright position from bending over without increased pain.  Met          PLAN  Yes  Continue plan of care  Re-Cert Due: 54/9/36  []  Upgrade activities as tolerated  []  Discharge due to :  []  Other:      Alda Kidd, PT       9/8/2023       12:56 PM

## 2023-09-12 ENCOUNTER — HOSPITAL ENCOUNTER (OUTPATIENT)
Facility: HOSPITAL | Age: 73
Setting detail: RECURRING SERIES
Discharge: HOME | End: 2023-09-15
Payer: MEDICARE

## 2023-09-12 PROCEDURE — 97140 MANUAL THERAPY 1/> REGIONS: CPT | Performed by: PHYSICAL THERAPIST

## 2023-09-12 PROCEDURE — 97112 NEUROMUSCULAR REEDUCATION: CPT | Performed by: PHYSICAL THERAPIST

## 2023-09-12 PROCEDURE — 97110 THERAPEUTIC EXERCISES: CPT | Performed by: PHYSICAL THERAPIST

## 2023-09-12 NOTE — PROGRESS NOTES
(0-10 scale):0/10        Assessment   Pt participated well with skilled PT services without report of pain throughout. Continues to be challenged by hip strengthening exercises and cueing to maintain level pelvis with SLS. Patient will continue to benefit from skilled PT / OT services to modify and progress therapeutic interventions, analyze and address functional mobility deficits, analyze and address ROM deficits, analyze and address strength deficits, analyze and address soft tissue restrictions, analyze and cue for proper movement patterns, analyze and modify for postural abnormalities, and instruct in home and community integration to address functional deficits and attain remaining goals. Progress toward goals / Updated goals:  [x]  See Progress Note/Recertification   Short Term Goals: To be accomplished in 4-6 weeks  Pt will be ind with HEP and compliant with PT attendance. Met  Pt will maintain pelvic alignment. Met     Long Term Goals: To be accomplished in 12 weeks  Pt will be able to walk 1 mile without increased pain. progressing  Pt will improve FOTO score by 8 points for functional improvement. Not Met  Pt will be able to sleep on sides without waking from pain. Not met  Pt will be able to return to upright position from bending over without increased pain.  Met          PLAN  Yes  Continue plan of care  Re-Cert Due: 47/4/11  []  Upgrade activities as tolerated  []  Discharge due to :  []  Other:      Sonja Dupree PT       9/12/2023       1:36 PM

## 2023-09-14 ENCOUNTER — APPOINTMENT (OUTPATIENT)
Facility: HOSPITAL | Age: 73
End: 2023-09-14
Payer: MEDICARE

## 2023-09-19 ENCOUNTER — HOSPITAL ENCOUNTER (OUTPATIENT)
Facility: HOSPITAL | Age: 73
Setting detail: RECURRING SERIES
Discharge: HOME | End: 2023-09-22
Payer: MEDICARE

## 2023-09-19 PROCEDURE — 97112 NEUROMUSCULAR REEDUCATION: CPT | Performed by: PHYSICAL THERAPIST

## 2023-09-19 PROCEDURE — 97110 THERAPEUTIC EXERCISES: CPT | Performed by: PHYSICAL THERAPIST

## 2023-09-19 PROCEDURE — 97140 MANUAL THERAPY 1/> REGIONS: CPT | Performed by: PHYSICAL THERAPIST

## 2023-09-19 NOTE — PROGRESS NOTES
PHYSICAL THERAPY - MEDICARE DAILY TREATMENT NOTE (updated 3/23)      Date: 2023          Patient Name:  Martha Brannon :  1950   Medical   Diagnosis:  Acute right-sided low back pain with right-sided sciatica [M54.41] Treatment Diagnosis:  M54.59  OTHER LOWER BACK PAIN    [place]  [place]* Insurance:   Payor: MEDICARE / Plan: MEDICARE PART A AND B / Product Type: *No Product type* /                     Patient  verified yes     Visit #   Current  / Total 13 Med nec   Time   In / Out 100 pm  204 pm   Total Treatment Time 64 min   Total Timed Codes 54 min   1:1 Treatment Time 44 min      [facility] Totals Reminder:  bill using total billable   min of TIMED therapeutic procedures and modalities. 8-22 min = 1 unit; 23-37 min = 2 units; 38-52 min = 3 units; 53-67 min = 4 units; 68-82 min = 5 units            SUBJECTIVE    Pain Level (0-10 scale): 0/10     Any medication changes, allergies to medications, adverse drug reactions, diagnosis change, or new procedure performed?: [x] No    [] Yes (see summary sheet for update)  Medications: Verified on Patient Summary List    Subjective functional status/changes:     Pt reports that she has been feeling good. Only feeling her pain when pressure is on the back of her hip. OBJECTIVE      Therapeutic Procedures: Tx Min Billable or 1:1 Min (if diff from Tx Min) Procedure, Rationale, Specifics   10 10 68585 Manual Therapy (timed):  decrease pain, increase ROM, increase tissue extensibility, and decrease trigger points to improve patient's ability to progress to PLOF and address remaining functional goals. The manual therapy interventions were performed at a separate and distinct time from the therapeutic activities interventions .  (see flow sheet as applicable)     Details if applicable:    STM to R piriformis in prone   29  09981 Therapeutic Exercise (timed):  increase ROM, strength, coordination, balance, and proprioception to

## 2023-09-21 ENCOUNTER — APPOINTMENT (OUTPATIENT)
Facility: HOSPITAL | Age: 73
End: 2023-09-21
Payer: MEDICARE

## 2023-09-26 ENCOUNTER — HOSPITAL ENCOUNTER (OUTPATIENT)
Facility: HOSPITAL | Age: 73
Setting detail: RECURRING SERIES
Discharge: HOME | End: 2023-09-29
Payer: MEDICARE

## 2023-09-26 PROCEDURE — 97140 MANUAL THERAPY 1/> REGIONS: CPT | Performed by: PHYSICAL THERAPIST

## 2023-09-26 PROCEDURE — 97110 THERAPEUTIC EXERCISES: CPT | Performed by: PHYSICAL THERAPIST

## 2023-09-26 PROCEDURE — 97112 NEUROMUSCULAR REEDUCATION: CPT | Performed by: PHYSICAL THERAPIST

## 2023-09-26 NOTE — PROGRESS NOTES
PHYSICAL THERAPY - MEDICARE DAILY TREATMENT NOTE (updated 3/23)      Date: 2023          Patient Name:  Omid Mitchell :  1950   Medical   Diagnosis:  Acute right-sided low back pain with right-sided sciatica [M54.41] Treatment Diagnosis:  M54.59  OTHER LOWER BACK PAIN    Referral Source:  Yandel Kruse* Insurance:   Payor: MEDICARE / Plan: MEDICARE PART A AND B / Product Type: *No Product type* /                     Patient  verified yes     Visit #   Current  / Total 14 Med nec   Time   In / Out 1258 pm  155 pm   Total Treatment Time 57 min   Total Timed Codes 47 min   1:1 Treatment Time 47 min      [facility] Totals Reminder:  bill using total billable   min of TIMED therapeutic procedures and modalities. 8-22 min = 1 unit; 23-37 min = 2 units; 38-52 min = 3 units; 53-67 min = 4 units; 68-82 min = 5 units            SUBJECTIVE    Pain Level (0-10 scale): 0/10     Any medication changes, allergies to medications, adverse drug reactions, diagnosis change, or new procedure performed?: [x] No    [] Yes (see summary sheet for update)  Medications: Verified on Patient Summary List    Subjective functional status/changes:     Pt reports that she has been doing very well. OBJECTIVE      Therapeutic Procedures: Tx Min Billable or 1:1 Min (if diff from Tx Min) Procedure, Rationale, Specifics   8  54847 Manual Therapy (timed):  decrease pain, increase ROM, increase tissue extensibility, and decrease trigger points to improve patient's ability to progress to PLOF and address remaining functional goals. The manual therapy interventions were performed at a separate and distinct time from the therapeutic activities interventions .  (see flow sheet as applicable)     Details if applicable:    STM to R piriformis in prone   20  08500 Therapeutic Exercise (timed):  increase ROM, strength, coordination, balance, and proprioception to improve patient's ability to progress to PLOF and address remaining

## 2023-09-28 ENCOUNTER — HOSPITAL ENCOUNTER (OUTPATIENT)
Facility: HOSPITAL | Age: 73
Setting detail: RECURRING SERIES
End: 2023-09-28
Payer: MEDICARE

## 2023-09-28 PROCEDURE — 97110 THERAPEUTIC EXERCISES: CPT | Performed by: PHYSICAL THERAPIST

## 2023-09-28 PROCEDURE — 97112 NEUROMUSCULAR REEDUCATION: CPT | Performed by: PHYSICAL THERAPIST

## 2023-09-28 PROCEDURE — 97140 MANUAL THERAPY 1/> REGIONS: CPT | Performed by: PHYSICAL THERAPIST

## 2023-09-28 NOTE — THERAPY DISCHARGE
Physical Therapy at [facility],   a part of [address] [place] [address], [address]  Phone: [phone]  Fax: [phone]     DISCHARGE SUMMARY  Patient Name: Penelope Saint : 1950   Treatment/Medical Diagnosis: Acute right-sided low back pain with right-sided sciatica [M54.41]   Referral Source: [place]*     Date of Initial Visit: 23 Attended Visits: 15 Missed Visits: 0     SUMMARY OF TREATMENT  Pt completed 15 skilled PT sessions making progress with pain levels, LE strength, and function. She has normalized gait pattern with no pain with ambulation and has significantly improved hip abd and ext strength. She has met all goals at this time and reached maximal benefit from skilled PT services. Progress toward goals / Updated goals:  [x]  See Progress Note/Recertification   Short Term Goals: To be accomplished in 4-6 weeks  Pt will be ind with HEP and compliant with PT attendance. Met  Pt will maintain pelvic alignment. Met     Long Term Goals: To be accomplished in 12 weeks  Pt will be able to walk 1 mile without increased pain. Met  Pt will improve FOTO score by 8 points for functional improvement. Met  Pt will be able to sleep on sides without waking from pain. Met  Pt will be able to return to upright position from bending over without increased pain.  Met    CURRENT STATUS    LOWER QUARTER                            MUSCLE STRENGTH  KEY                                                                             R                      L  0 - No Contraction                   L1, L2 Psoas               4 +                     5  1 - Trace                                  L3 Quads                    5                      5  2 - Poor                                   L4 Tib Ant                    5                      5  3 - Fair                                     L5 EHL                        5                      5  4 - Good S1 Peroneals              NT                   NT  5 - Normal                               S2 Hams                     5                      5                  MMT:               HIP Ext: 4+ L, 4+ R              HIP Abd: 4+ bi       Objective/Functional Outcome Measure:   FOTO Score: 94/100 (67/100 initially)  FOTO score = an established functional score where 100 = no disability    RECOMMENDATIONS  Discontinue therapy. Progressing towards or have reached established goals. Arn Gitelman, PT       9/28/2023       1:18 PM    If you have any questions/comments please contact us directly at [phone]. Thank you for allowing us to assist in the care of your patient.

## 2023-09-28 NOTE — PROGRESS NOTES
PHYSICAL THERAPY - MEDICARE DAILY TREATMENT NOTE (updated 3/23)      Date: 2023          Patient Name:  Jory Porter :  1950   Medical   Diagnosis:  Acute right-sided low back pain with right-sided sciatica [M54.41] Treatment Diagnosis:  M54.59  OTHER LOWER BACK PAIN    Referral Source:  [place], [place]* Insurance:   Payor: MEDICARE / Plan: MEDICARE PART A AND B / Product Type: *No Product type* /                     Patient  verified yes     Visit #   Current  / Total 15 Med nec   Time   In / Out 1256 pm  200 pm   Total Treatment Time 64 min   Total Timed Codes 64 min   1:1 Treatment Time 53 min      [facility] Totals Reminder:  bill using total billable   min of TIMED therapeutic procedures and modalities. 8-22 min = 1 unit; 23-37 min = 2 units; 38-52 min = 3 units; 53-67 min = 4 units; 68-82 min = 5 units            SUBJECTIVE    Pain Level (0-10 scale): 0/10     Any medication changes, allergies to medications, adverse drug reactions, diagnosis change, or new procedure performed?: [x] No    [] Yes (see summary sheet for update)  Medications: Verified on Patient Summary List    Subjective functional status/changes:     Pt reports that she has been doing very well. Mild soreness in piriformis with pressure on it, but no pain since previous visit. Pt reports today will be her last visit as she is going out of town. OBJECTIVE      Therapeutic Procedures: Tx Min Billable or 1:1 Min (if diff from Tx Min) Procedure, Rationale, Specifics   10 10 08035 Manual Therapy (timed):  decrease pain, increase ROM, increase tissue extensibility, and decrease trigger points to improve patient's ability to progress to PLOF and address remaining functional goals. The manual therapy interventions were performed at a separate and distinct time from the therapeutic activities interventions .  (see flow sheet as applicable)     Details if applicable:    STM to R piriformis in prone   34 23 37395 Therapeutic Exercise (timed):  increase ROM, strength, coordination, balance, and proprioception to improve patient's ability to progress to PLOF and address remaining functional goals. (see flow sheet as applicable)     Details if applicable:     20 20 97196 Neuromuscular Re-Education (timed):  improve balance, coordination, kinesthetic sense, posture, core stability and proprioception to improve patient's ability to develop conscious control of individual muscles and awareness of position of extremities in order to progress to PLOF and address remaining functional goals. (see flow sheet as applicable)    Details if applicable:     64 53    Total Total         Modalities Rationale:     decrease inflammation and decrease pain to improve patient's ability to progress to PLOF and address remaining functional goals. min [] Estim Unattended,             type/location:       []  w/ice    []  w/heat        min [] Estim Attended,             type/location:       []  w/ice   []  w/heat         []  w/US   []  TENS insruct            min []  Mechanical Traction,        type/lbs:        []  pro      []  sup           []  int       []  cont            []  before manual           []  after manual     min []  Ultrasound,         settings/location:     declined min  unbilled [x]  Ice     []  Heat            location/position: SI/R hip/ supine with legs elevated         min []  Vasopneumatic Device,      press/temp:   pre-treatment girth :    post-treatment girth :    measured at (landmark       location) :    If using vaso (only need to measure limb vaso being performed on)        min []  Other:      Skin assessment post-treatment (if applicable):    [x]  intact    []  redness- no adverse reaction                 []redness - adverse reaction:          [x]  Patient Education billed concurrently with other procedures   [x] Review HEP    [x] Progressed/Changed HEP, detail:  HEP updated and provided with handout  [] Other detail:

## 2023-12-21 ENCOUNTER — HOSPITAL ENCOUNTER (OUTPATIENT)
Age: 73
Discharge: HOME | End: 2023-12-24
Payer: MEDICARE

## 2023-12-21 DIAGNOSIS — B97.89 VIRAL RESPIRATORY ILLNESS: ICD-10-CM

## 2023-12-21 DIAGNOSIS — J98.8 VIRAL RESPIRATORY ILLNESS: ICD-10-CM

## 2023-12-21 PROCEDURE — 71046 X-RAY EXAM CHEST 2 VIEWS: CPT

## 2023-12-26 RX ORDER — VALSARTAN 160 MG/1
160 TABLET ORAL DAILY
Qty: 90 TABLET | Refills: 1 | Status: SHIPPED | OUTPATIENT
Start: 2023-12-26

## 2023-12-26 RX ORDER — FLUOXETINE HYDROCHLORIDE 20 MG/1
20 CAPSULE ORAL DAILY
Qty: 90 CAPSULE | Refills: 1 | Status: SHIPPED | OUTPATIENT
Start: 2023-12-26

## 2024-02-19 RX ORDER — LEVOTHYROXINE SODIUM 100 MCG
TABLET ORAL
Qty: 90 TABLET | Refills: 1 | Status: SHIPPED | OUTPATIENT
Start: 2024-02-19

## 2024-02-19 NOTE — PROGRESS NOTES
Assessment/Plan:     1. Essential (primary) hypertension  -controlled wtith [id number] daily  -no adjustments needed    2. Mixed hyperlipidemia  -taking simvastatin 40mg daily  -need fasting lipid panel at next visit    3. Hypothyroidism, unspecified type  -appears clinically euthyroid.   -taking levothyroxine 100mcg daily.     - TSH; Future  - T4, Free; Future    4. Depression, unspecified depression type  -mood stable  -continue use of prozac 20mg daily.     5. IFG (impaired fasting glucose)  -encouraged maintenance of low sugar and carbohydrate diet.   -need regular exercise.     - Comprehensive Metabolic Panel; Future  - Hemoglobin A1C; Future    6. Other long term (current) drug therapy       7. Travel  -recommended she consult with travel clinic regarding immunization recommendations for travel to [place].    Orders Placed This Encounter    Comprehensive Metabolic Panel     Standing Status:   Future     Standing Expiration Date:   2/20/2025    Hemoglobin A1C     Standing Status:   Future     Standing Expiration Date:   2/20/2025    TSH     Standing Status:   Future     Standing Expiration Date:   2/20/2025    T4, Free     Standing Status:   Future     Standing Expiration Date:   2/20/2025    TYRVAYA 0.03 MG/ACT SOLN        Follow-up Disposition:     Return in about 6 months (around 8/20/2024) for Medicare AWV.               Subjective:      Gabbie Morrison is a 73 y.o. female who presents today for follow up of her hypertension, hyperlipidemia, hypothyroid, IFG  and depression.     Since last visit :  -will be traveling to [place] with friends in August.       Patient Care Team:  -Dr. RICKY Alvarado - dermatology  -Dr. COLUMBA Morris - benign ovarian cyst  -Dr. Nagy - GI (retired)  -Dr. KARO Ricardo, cardiology - elevated calcium score  -Dr. Alcazar, eye care. 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 KITTY Keating, ortho.   -Dr. Gaviria, ENT  -Dr. JUVE Wild, Gyn     Objective:     Wt Readings from Last 3 Encounters:   02/20/24

## 2024-02-20 ENCOUNTER — OFFICE VISIT (OUTPATIENT)
Age: 74
End: 2024-02-20
Payer: MEDICARE

## 2024-02-20 VITALS
HEIGHT: 64 IN | RESPIRATION RATE: 16 BRPM | OXYGEN SATURATION: 99 % | TEMPERATURE: 97.8 F | BODY MASS INDEX: 25.64 KG/M2 | HEART RATE: 61 BPM | WEIGHT: 150.2 LBS | DIASTOLIC BLOOD PRESSURE: 70 MMHG | SYSTOLIC BLOOD PRESSURE: 130 MMHG

## 2024-02-20 DIAGNOSIS — R73.01 IFG (IMPAIRED FASTING GLUCOSE): ICD-10-CM

## 2024-02-20 DIAGNOSIS — F32.A DEPRESSION, UNSPECIFIED DEPRESSION TYPE: ICD-10-CM

## 2024-02-20 DIAGNOSIS — E03.9 HYPOTHYROIDISM, UNSPECIFIED TYPE: ICD-10-CM

## 2024-02-20 DIAGNOSIS — E78.2 MIXED HYPERLIPIDEMIA: ICD-10-CM

## 2024-02-20 DIAGNOSIS — I10 ESSENTIAL (PRIMARY) HYPERTENSION: Primary | ICD-10-CM

## 2024-02-20 DIAGNOSIS — Z79.899 OTHER LONG TERM (CURRENT) DRUG THERAPY: ICD-10-CM

## 2024-02-20 LAB
ALBUMIN SERPL-MCNC: 4.1 G/DL (ref 3.5–5)
ALBUMIN/GLOB SERPL: 1.4 (ref 1.1–2.2)
ALP SERPL-CCNC: 104 U/L (ref 45–117)
ALT SERPL-CCNC: 27 U/L (ref 12–78)
ANION GAP SERPL CALC-SCNC: 3 MMOL/L (ref 5–15)
AST SERPL-CCNC: 23 U/L (ref 15–37)
BILIRUB SERPL-MCNC: 0.8 MG/DL (ref 0.2–1)
BUN SERPL-MCNC: 21 MG/DL (ref 6–20)
BUN/CREAT SERPL: 28 (ref 12–20)
CALCIUM SERPL-MCNC: 9.7 MG/DL (ref 8.5–10.1)
CHLORIDE SERPL-SCNC: 105 MMOL/L (ref 97–108)
CO2 SERPL-SCNC: 31 MMOL/L (ref 21–32)
CREAT SERPL-MCNC: 0.76 MG/DL (ref 0.55–1.02)
EST. AVERAGE GLUCOSE BLD GHB EST-MCNC: 103 MG/DL
GLOBULIN SER CALC-MCNC: 2.9 G/DL (ref 2–4)
GLUCOSE SERPL-MCNC: 97 MG/DL (ref 65–100)
HBA1C MFR BLD: 5.2 % (ref 4–5.6)
POTASSIUM SERPL-SCNC: 5.2 MMOL/L (ref 3.5–5.1)
PROT SERPL-MCNC: 7 G/DL (ref 6.4–8.2)
SODIUM SERPL-SCNC: 139 MMOL/L (ref 136–145)
T4 FREE SERPL-MCNC: 1.2 NG/DL (ref 0.8–1.5)
TSH SERPL DL<=0.05 MIU/L-ACNC: 0.9 UIU/ML (ref 0.36–3.74)

## 2024-02-20 PROCEDURE — 99214 OFFICE O/P EST MOD 30 MIN: CPT | Performed by: INTERNAL MEDICINE

## 2024-02-20 PROCEDURE — G8400 PT W/DXA NO RESULTS DOC: HCPCS | Performed by: INTERNAL MEDICINE

## 2024-02-20 PROCEDURE — 3075F SYST BP GE 130 - 139MM HG: CPT | Performed by: INTERNAL MEDICINE

## 2024-02-20 PROCEDURE — 1123F ACP DISCUSS/DSCN MKR DOCD: CPT | Performed by: INTERNAL MEDICINE

## 2024-02-20 PROCEDURE — 3078F DIAST BP <80 MM HG: CPT | Performed by: INTERNAL MEDICINE

## 2024-02-20 PROCEDURE — G8419 CALC BMI OUT NRM PARAM NOF/U: HCPCS | Performed by: INTERNAL MEDICINE

## 2024-02-20 PROCEDURE — 3017F COLORECTAL CA SCREEN DOC REV: CPT | Performed by: INTERNAL MEDICINE

## 2024-02-20 PROCEDURE — G8484 FLU IMMUNIZE NO ADMIN: HCPCS | Performed by: INTERNAL MEDICINE

## 2024-02-20 PROCEDURE — 1090F PRES/ABSN URINE INCON ASSESS: CPT | Performed by: INTERNAL MEDICINE

## 2024-02-20 PROCEDURE — G8428 CUR MEDS NOT DOCUMENT: HCPCS | Performed by: INTERNAL MEDICINE

## 2024-02-20 PROCEDURE — 1036F TOBACCO NON-USER: CPT | Performed by: INTERNAL MEDICINE

## 2024-02-20 RX ORDER — VARENICLINE 0.03 MG/.05ML
SPRAY NASAL
COMMUNITY
Start: 2023-11-09

## 2024-02-20 ASSESSMENT — PATIENT HEALTH QUESTIONNAIRE - PHQ9
SUM OF ALL RESPONSES TO PHQ QUESTIONS 1-9: 0
5. POOR APPETITE OR OVEREATING: 0
4. FEELING TIRED OR HAVING LITTLE ENERGY: 0
SUM OF ALL RESPONSES TO PHQ QUESTIONS 1-9: 0
SUM OF ALL RESPONSES TO PHQ QUESTIONS 1-9: 0
SUM OF ALL RESPONSES TO PHQ9 QUESTIONS 1 & 2: 0
7. TROUBLE CONCENTRATING ON THINGS, SUCH AS READING THE NEWSPAPER OR WATCHING TELEVISION: 0
2. FEELING DOWN, DEPRESSED OR HOPELESS: 0
10. IF YOU CHECKED OFF ANY PROBLEMS, HOW DIFFICULT HAVE THESE PROBLEMS MADE IT FOR YOU TO DO YOUR WORK, TAKE CARE OF THINGS AT HOME, OR GET ALONG WITH OTHER PEOPLE: 0
8. MOVING OR SPEAKING SO SLOWLY THAT OTHER PEOPLE COULD HAVE NOTICED. OR THE OPPOSITE, BEING SO FIGETY OR RESTLESS THAT YOU HAVE BEEN MOVING AROUND A LOT MORE THAN USUAL: 0
9. THOUGHTS THAT YOU WOULD BE BETTER OFF DEAD, OR OF HURTING YOURSELF: 0
1. LITTLE INTEREST OR PLEASURE IN DOING THINGS: 0
3. TROUBLE FALLING OR STAYING ASLEEP: 0
SUM OF ALL RESPONSES TO PHQ QUESTIONS 1-9: 0
6. FEELING BAD ABOUT YOURSELF - OR THAT YOU ARE A FAILURE OR HAVE LET YOURSELF OR YOUR FAMILY DOWN: 0

## 2024-02-28 RX ORDER — SIMVASTATIN 40 MG
40 TABLET ORAL NIGHTLY
Qty: 90 TABLET | Refills: 1 | Status: SHIPPED | OUTPATIENT
Start: 2024-02-28

## 2024-05-30 ENCOUNTER — TELEPHONE (OUTPATIENT)
Age: 74
End: 2024-05-30

## 2024-05-30 NOTE — TELEPHONE ENCOUNTER
----- Message from Cayla Rangel sent at 5/30/2024  2:22 PM EDT -----  Subject: Message to Provider    QUESTIONS  Information for Provider? Patient needs to reschedule her physical exam,   said will see another provider , she is leaving the country 8/14/2024   please call back to advise.  ---------------------------------------------------------------------------  --------------  CALL BACK INFO  [phone]; OK to leave message on voicemail  ---------------------------------------------------------------------------  --------------  SCRIPT ANSWERS  Relationship to Patient? Self

## 2024-06-20 RX ORDER — FLUOXETINE HYDROCHLORIDE 20 MG/1
20 CAPSULE ORAL DAILY
Qty: 90 CAPSULE | Refills: 0 | Status: SHIPPED | OUTPATIENT
Start: 2024-06-20

## 2024-06-20 NOTE — TELEPHONE ENCOUNTER
Rx sent to pharmacy as previously filled and verified by Verbal Order Read Back with provider.    NV 09/17/2024

## 2024-07-23 ENCOUNTER — TELEPHONE (OUTPATIENT)
Age: 74
End: 2024-07-23

## 2024-07-23 RX ORDER — VALSARTAN 160 MG/1
160 TABLET ORAL DAILY
Qty: 90 TABLET | Refills: 1 | Status: SHIPPED | OUTPATIENT
Start: 2024-07-23

## 2024-07-23 NOTE — TELEPHONE ENCOUNTER
Medication Refill Request    Gabbie Morrison is requesting a refill of the following medication(s):   Valsartan    Please send refill to:   [facility] - [place], [place] - [address] [place] [address] -  [phone] - F [phone] [address] [facility] [address]  Phone: [phone] Fax: [phone]

## 2024-08-16 RX ORDER — LEVOTHYROXINE SODIUM 100 MCG
TABLET ORAL
Qty: 90 TABLET | Refills: 0 | Status: SHIPPED | OUTPATIENT
Start: 2024-08-16

## 2024-08-26 RX ORDER — SIMVASTATIN 40 MG
40 TABLET ORAL NIGHTLY
Qty: 90 TABLET | Refills: 1 | Status: SHIPPED | OUTPATIENT
Start: 2024-08-26

## 2024-09-14 SDOH — HEALTH STABILITY: PHYSICAL HEALTH: ON AVERAGE, HOW MANY MINUTES DO YOU ENGAGE IN EXERCISE AT THIS LEVEL?: 30 MIN

## 2024-09-14 SDOH — ECONOMIC STABILITY: INCOME INSECURITY: HOW HARD IS IT FOR YOU TO PAY FOR THE VERY BASICS LIKE FOOD, HOUSING, MEDICAL CARE, AND HEATING?: NOT HARD AT ALL

## 2024-09-14 SDOH — ECONOMIC STABILITY: FOOD INSECURITY: WITHIN THE PAST 12 MONTHS, YOU WORRIED THAT YOUR FOOD WOULD RUN OUT BEFORE YOU GOT MONEY TO BUY MORE.: NEVER TRUE

## 2024-09-14 SDOH — ECONOMIC STABILITY: FOOD INSECURITY: WITHIN THE PAST 12 MONTHS, THE FOOD YOU BOUGHT JUST DIDN'T LAST AND YOU DIDN'T HAVE MONEY TO GET MORE.: NEVER TRUE

## 2024-09-14 SDOH — ECONOMIC STABILITY: TRANSPORTATION INSECURITY
IN THE PAST 12 MONTHS, HAS LACK OF TRANSPORTATION KEPT YOU FROM MEETINGS, WORK, OR FROM GETTING THINGS NEEDED FOR DAILY LIVING?: NO

## 2024-09-14 SDOH — HEALTH STABILITY: PHYSICAL HEALTH: ON AVERAGE, HOW MANY DAYS PER WEEK DO YOU ENGAGE IN MODERATE TO STRENUOUS EXERCISE (LIKE A BRISK WALK)?: 3 DAYS

## 2024-09-14 ASSESSMENT — LIFESTYLE VARIABLES
HOW OFTEN DURING THE LAST YEAR HAVE YOU FOUND THAT YOU WERE NOT ABLE TO STOP DRINKING ONCE YOU HAD STARTED: NEVER
HAS A RELATIVE, FRIEND, DOCTOR, OR ANOTHER HEALTH PROFESSIONAL EXPRESSED CONCERN ABOUT YOUR DRINKING OR SUGGESTED YOU CUT DOWN: NO
HOW OFTEN DO YOU HAVE A DRINK CONTAINING ALCOHOL: 5
HOW OFTEN DURING THE LAST YEAR HAVE YOU NEEDED AN ALCOHOLIC DRINK FIRST THING IN THE MORNING TO GET YOURSELF GOING AFTER A NIGHT OF HEAVY DRINKING: NEVER
HOW OFTEN DURING THE LAST YEAR HAVE YOU NEEDED AN ALCOHOLIC DRINK FIRST THING IN THE MORNING TO GET YOURSELF GOING AFTER A NIGHT OF HEAVY DRINKING: NEVER
HAVE YOU OR SOMEONE ELSE BEEN INJURED AS A RESULT OF YOUR DRINKING: NO
HOW OFTEN DURING THE LAST YEAR HAVE YOU HAD A FEELING OF GUILT OR REMORSE AFTER DRINKING: NEVER
HOW OFTEN DURING THE LAST YEAR HAVE YOU FAILED TO DO WHAT WAS NORMALLY EXPECTED FROM YOU BECAUSE OF DRINKING: NEVER
HOW OFTEN DURING THE LAST YEAR HAVE YOU BEEN UNABLE TO REMEMBER WHAT HAPPENED THE NIGHT BEFORE BECAUSE YOU HAD BEEN DRINKING: NEVER
HAVE YOU OR SOMEONE ELSE BEEN INJURED AS A RESULT OF YOUR DRINKING: NO
HOW OFTEN DO YOU HAVE SIX OR MORE DRINKS ON ONE OCCASION: 1
HOW OFTEN DURING THE LAST YEAR HAVE YOU BEEN UNABLE TO REMEMBER WHAT HAPPENED THE NIGHT BEFORE BECAUSE YOU HAD BEEN DRINKING: NEVER
HOW OFTEN DURING THE LAST YEAR HAVE YOU FOUND THAT YOU WERE NOT ABLE TO STOP DRINKING ONCE YOU HAD STARTED: NEVER
HOW MANY STANDARD DRINKS CONTAINING ALCOHOL DO YOU HAVE ON A TYPICAL DAY: 1 OR 2
HOW OFTEN DURING THE LAST YEAR HAVE YOU FAILED TO DO WHAT WAS NORMALLY EXPECTED FROM YOU BECAUSE OF DRINKING: NEVER
HAS A RELATIVE, FRIEND, DOCTOR, OR ANOTHER HEALTH PROFESSIONAL EXPRESSED CONCERN ABOUT YOUR DRINKING OR SUGGESTED YOU CUT DOWN: NO
HOW OFTEN DO YOU HAVE A DRINK CONTAINING ALCOHOL: 4 OR MORE TIMES A WEEK
HOW MANY STANDARD DRINKS CONTAINING ALCOHOL DO YOU HAVE ON A TYPICAL DAY: 1
HOW OFTEN DURING THE LAST YEAR HAVE YOU HAD A FEELING OF GUILT OR REMORSE AFTER DRINKING: NEVER

## 2024-09-14 ASSESSMENT — PATIENT HEALTH QUESTIONNAIRE - PHQ9
2. FEELING DOWN, DEPRESSED OR HOPELESS: NOT AT ALL
SUM OF ALL RESPONSES TO PHQ QUESTIONS 1-9: 0
1. LITTLE INTEREST OR PLEASURE IN DOING THINGS: NOT AT ALL
SUM OF ALL RESPONSES TO PHQ QUESTIONS 1-9: 0
SUM OF ALL RESPONSES TO PHQ9 QUESTIONS 1 & 2: 0
SUM OF ALL RESPONSES TO PHQ QUESTIONS 1-9: 0
SUM OF ALL RESPONSES TO PHQ QUESTIONS 1-9: 0

## 2024-09-17 ENCOUNTER — OFFICE VISIT (OUTPATIENT)
Age: 74
End: 2024-09-17
Payer: MEDICARE

## 2024-09-17 VITALS
TEMPERATURE: 97.4 F | DIASTOLIC BLOOD PRESSURE: 80 MMHG | HEART RATE: 53 BPM | SYSTOLIC BLOOD PRESSURE: 130 MMHG | OXYGEN SATURATION: 96 % | RESPIRATION RATE: 16 BRPM | HEIGHT: 64 IN | BODY MASS INDEX: 25.81 KG/M2 | WEIGHT: 151.2 LBS

## 2024-09-17 DIAGNOSIS — E03.9 HYPOTHYROIDISM, UNSPECIFIED TYPE: ICD-10-CM

## 2024-09-17 DIAGNOSIS — I10 ESSENTIAL (PRIMARY) HYPERTENSION: ICD-10-CM

## 2024-09-17 DIAGNOSIS — Z00.00 MEDICARE ANNUAL WELLNESS VISIT, SUBSEQUENT: Primary | ICD-10-CM

## 2024-09-17 DIAGNOSIS — F32.A DEPRESSION, UNSPECIFIED DEPRESSION TYPE: ICD-10-CM

## 2024-09-17 DIAGNOSIS — E78.2 MIXED HYPERLIPIDEMIA: ICD-10-CM

## 2024-09-17 DIAGNOSIS — Z79.899 OTHER LONG TERM (CURRENT) DRUG THERAPY: ICD-10-CM

## 2024-09-17 DIAGNOSIS — R73.01 IFG (IMPAIRED FASTING GLUCOSE): ICD-10-CM

## 2024-09-17 PROCEDURE — 1123F ACP DISCUSS/DSCN MKR DOCD: CPT | Performed by: INTERNAL MEDICINE

## 2024-09-17 PROCEDURE — 3017F COLORECTAL CA SCREEN DOC REV: CPT | Performed by: INTERNAL MEDICINE

## 2024-09-17 PROCEDURE — 1090F PRES/ABSN URINE INCON ASSESS: CPT | Performed by: INTERNAL MEDICINE

## 2024-09-17 PROCEDURE — G8400 PT W/DXA NO RESULTS DOC: HCPCS | Performed by: INTERNAL MEDICINE

## 2024-09-17 PROCEDURE — 3079F DIAST BP 80-89 MM HG: CPT | Performed by: INTERNAL MEDICINE

## 2024-09-17 PROCEDURE — 3075F SYST BP GE 130 - 139MM HG: CPT | Performed by: INTERNAL MEDICINE

## 2024-09-17 PROCEDURE — 99213 OFFICE O/P EST LOW 20 MIN: CPT | Performed by: INTERNAL MEDICINE

## 2024-09-17 PROCEDURE — G8427 DOCREV CUR MEDS BY ELIG CLIN: HCPCS | Performed by: INTERNAL MEDICINE

## 2024-09-17 PROCEDURE — 1036F TOBACCO NON-USER: CPT | Performed by: INTERNAL MEDICINE

## 2024-09-17 PROCEDURE — G8419 CALC BMI OUT NRM PARAM NOF/U: HCPCS | Performed by: INTERNAL MEDICINE

## 2024-09-17 PROCEDURE — G0439 PPPS, SUBSEQ VISIT: HCPCS | Performed by: INTERNAL MEDICINE

## 2024-09-18 LAB
ALBUMIN SERPL-MCNC: 4.3 G/DL (ref 3.5–5)
ALBUMIN/GLOB SERPL: 1.5 (ref 1.1–2.2)
ALP SERPL-CCNC: 111 U/L (ref 45–117)
ALT SERPL-CCNC: 27 U/L (ref 12–78)
ANION GAP SERPL CALC-SCNC: 7 MMOL/L (ref 2–12)
APPEARANCE UR: CLEAR
AST SERPL-CCNC: 23 U/L (ref 15–37)
BACTERIA URNS QL MICRO: NEGATIVE /HPF
BASOPHILS # BLD: 0 K/UL (ref 0–0.1)
BASOPHILS NFR BLD: 1 % (ref 0–1)
BILIRUB SERPL-MCNC: 0.7 MG/DL (ref 0.2–1)
BILIRUB UR QL: NEGATIVE
BUN SERPL-MCNC: 23 MG/DL (ref 6–20)
BUN/CREAT SERPL: 33 (ref 12–20)
CALCIUM SERPL-MCNC: 9.6 MG/DL (ref 8.5–10.1)
CHLORIDE SERPL-SCNC: 104 MMOL/L (ref 97–108)
CHOLEST SERPL-MCNC: 217 MG/DL
CO2 SERPL-SCNC: 27 MMOL/L (ref 21–32)
COLOR UR: NORMAL
CREAT SERPL-MCNC: 0.7 MG/DL (ref 0.55–1.02)
DIFFERENTIAL METHOD BLD: NORMAL
EOSINOPHIL # BLD: 0.1 K/UL (ref 0–0.4)
EOSINOPHIL NFR BLD: 2 % (ref 0–7)
EPITH CASTS URNS QL MICRO: NORMAL /LPF
ERYTHROCYTE [DISTWIDTH] IN BLOOD BY AUTOMATED COUNT: 12.2 % (ref 11.5–14.5)
EST. AVERAGE GLUCOSE BLD GHB EST-MCNC: 111 MG/DL
GLOBULIN SER CALC-MCNC: 2.9 G/DL (ref 2–4)
GLUCOSE SERPL-MCNC: 91 MG/DL (ref 65–100)
GLUCOSE UR STRIP.AUTO-MCNC: NEGATIVE MG/DL
HBA1C MFR BLD: 5.5 % (ref 4–5.6)
HCT VFR BLD AUTO: 44.7 % (ref 35–47)
HDLC SERPL-MCNC: 81 MG/DL
HDLC SERPL: 2.7 (ref 0–5)
HGB BLD-MCNC: 14.6 G/DL (ref 11.5–16)
HGB UR QL STRIP: NEGATIVE
HYALINE CASTS URNS QL MICRO: NORMAL /LPF (ref 0–5)
IMM GRANULOCYTES # BLD AUTO: 0 K/UL (ref 0–0.04)
IMM GRANULOCYTES NFR BLD AUTO: 0 % (ref 0–0.5)
KETONES UR QL STRIP.AUTO: NEGATIVE MG/DL
LDLC SERPL CALC-MCNC: 106.8 MG/DL (ref 0–100)
LEUKOCYTE ESTERASE UR QL STRIP.AUTO: NEGATIVE
LYMPHOCYTES # BLD: 1.3 K/UL (ref 0.8–3.5)
LYMPHOCYTES NFR BLD: 29 % (ref 12–49)
MCH RBC QN AUTO: 32.1 PG (ref 26–34)
MCHC RBC AUTO-ENTMCNC: 32.7 G/DL (ref 30–36.5)
MCV RBC AUTO: 98.2 FL (ref 80–99)
MONOCYTES # BLD: 0.5 K/UL (ref 0–1)
MONOCYTES NFR BLD: 10 % (ref 5–13)
NEUTS SEG # BLD: 2.7 K/UL (ref 1.8–8)
NEUTS SEG NFR BLD: 58 % (ref 32–75)
NITRITE UR QL STRIP.AUTO: NEGATIVE
NRBC # BLD: 0 K/UL (ref 0–0.01)
NRBC BLD-RTO: 0 PER 100 WBC
PH UR STRIP: 6.5 (ref 5–8)
PLATELET # BLD AUTO: 243 K/UL (ref 150–400)
PMV BLD AUTO: 11.5 FL (ref 8.9–12.9)
POTASSIUM SERPL-SCNC: 4.5 MMOL/L (ref 3.5–5.1)
PROT SERPL-MCNC: 7.2 G/DL (ref 6.4–8.2)
PROT UR STRIP-MCNC: NEGATIVE MG/DL
RBC # BLD AUTO: 4.55 M/UL (ref 3.8–5.2)
RBC #/AREA URNS HPF: NORMAL /HPF (ref 0–5)
SODIUM SERPL-SCNC: 138 MMOL/L (ref 136–145)
SP GR UR REFRACTOMETRY: 1.01 (ref 1–1.03)
T4 FREE SERPL-MCNC: 1.4 NG/DL (ref 0.8–1.5)
TRIGL SERPL-MCNC: 146 MG/DL
TSH SERPL DL<=0.05 MIU/L-ACNC: 0.83 UIU/ML (ref 0.36–3.74)
URINE CULTURE IF INDICATED: NORMAL
UROBILINOGEN UR QL STRIP.AUTO: 1 EU/DL (ref 0.2–1)
VLDLC SERPL CALC-MCNC: 29.2 MG/DL
WBC # BLD AUTO: 4.6 K/UL (ref 3.6–11)
WBC URNS QL MICRO: NORMAL /HPF (ref 0–4)

## 2024-09-24 ENCOUNTER — OFFICE VISIT (OUTPATIENT)
Age: 74
End: 2024-09-24
Payer: MEDICARE

## 2024-09-24 VITALS
WEIGHT: 150 LBS | HEIGHT: 64 IN | RESPIRATION RATE: 16 BRPM | SYSTOLIC BLOOD PRESSURE: 120 MMHG | BODY MASS INDEX: 25.61 KG/M2 | OXYGEN SATURATION: 97 % | HEART RATE: 57 BPM | DIASTOLIC BLOOD PRESSURE: 78 MMHG

## 2024-09-24 DIAGNOSIS — E78.2 MIXED HYPERLIPIDEMIA: ICD-10-CM

## 2024-09-24 DIAGNOSIS — I77.9 CAROTID ARTERY DISEASE WITHOUT CEREBRAL INFARCTION (HCC): ICD-10-CM

## 2024-09-24 DIAGNOSIS — Z82.49 FAMILY HISTORY OF ISCHEMIC HEART DISEASE AND OTHER DISEASES OF THE CIRCULATORY SYSTEM: ICD-10-CM

## 2024-09-24 DIAGNOSIS — I10 PRIMARY HYPERTENSION: Primary | ICD-10-CM

## 2024-09-24 DIAGNOSIS — R93.1 AGATSTON CAC SCORE 100-199: ICD-10-CM

## 2024-09-24 PROCEDURE — G8400 PT W/DXA NO RESULTS DOC: HCPCS | Performed by: SPECIALIST

## 2024-09-24 PROCEDURE — 3017F COLORECTAL CA SCREEN DOC REV: CPT | Performed by: SPECIALIST

## 2024-09-24 PROCEDURE — 3078F DIAST BP <80 MM HG: CPT | Performed by: SPECIALIST

## 2024-09-24 PROCEDURE — 1090F PRES/ABSN URINE INCON ASSESS: CPT | Performed by: SPECIALIST

## 2024-09-24 PROCEDURE — 99214 OFFICE O/P EST MOD 30 MIN: CPT | Performed by: SPECIALIST

## 2024-09-24 PROCEDURE — G8427 DOCREV CUR MEDS BY ELIG CLIN: HCPCS | Performed by: SPECIALIST

## 2024-09-24 PROCEDURE — G8419 CALC BMI OUT NRM PARAM NOF/U: HCPCS | Performed by: SPECIALIST

## 2024-09-24 PROCEDURE — 1036F TOBACCO NON-USER: CPT | Performed by: SPECIALIST

## 2024-09-24 PROCEDURE — 1123F ACP DISCUSS/DSCN MKR DOCD: CPT | Performed by: SPECIALIST

## 2024-09-24 PROCEDURE — 3074F SYST BP LT 130 MM HG: CPT | Performed by: SPECIALIST

## 2024-09-24 RX ORDER — PREDNISOLONE ACETATE 10 MG/ML
SUSPENSION/ DROPS OPHTHALMIC
COMMUNITY
Start: 2024-03-28

## 2024-09-24 ASSESSMENT — PATIENT HEALTH QUESTIONNAIRE - PHQ9
6. FEELING BAD ABOUT YOURSELF - OR THAT YOU ARE A FAILURE OR HAVE LET YOURSELF OR YOUR FAMILY DOWN: NOT AT ALL
4. FEELING TIRED OR HAVING LITTLE ENERGY: NOT AT ALL
SUM OF ALL RESPONSES TO PHQ QUESTIONS 1-9: 0
SUM OF ALL RESPONSES TO PHQ9 QUESTIONS 1 & 2: 0
3. TROUBLE FALLING OR STAYING ASLEEP: NOT AT ALL
9. THOUGHTS THAT YOU WOULD BE BETTER OFF DEAD, OR OF HURTING YOURSELF: NOT AT ALL
1. LITTLE INTEREST OR PLEASURE IN DOING THINGS: NOT AT ALL
5. POOR APPETITE OR OVEREATING: NOT AT ALL
7. TROUBLE CONCENTRATING ON THINGS, SUCH AS READING THE NEWSPAPER OR WATCHING TELEVISION: NOT AT ALL
SUM OF ALL RESPONSES TO PHQ QUESTIONS 1-9: 0
8. MOVING OR SPEAKING SO SLOWLY THAT OTHER PEOPLE COULD HAVE NOTICED. OR THE OPPOSITE, BEING SO FIGETY OR RESTLESS THAT YOU HAVE BEEN MOVING AROUND A LOT MORE THAN USUAL: NOT AT ALL
SUM OF ALL RESPONSES TO PHQ QUESTIONS 1-9: 0
SUM OF ALL RESPONSES TO PHQ QUESTIONS 1-9: 0
2. FEELING DOWN, DEPRESSED OR HOPELESS: NOT AT ALL
10. IF YOU CHECKED OFF ANY PROBLEMS, HOW DIFFICULT HAVE THESE PROBLEMS MADE IT FOR YOU TO DO YOUR WORK, TAKE CARE OF THINGS AT HOME, OR GET ALONG WITH OTHER PEOPLE: NOT DIFFICULT AT ALL

## 2024-11-04 RX ORDER — LEVOTHYROXINE SODIUM 100 MCG
TABLET ORAL
Qty: 90 TABLET | Refills: 0 | Status: SHIPPED | OUTPATIENT
Start: 2024-11-04

## 2024-11-04 NOTE — TELEPHONE ENCOUNTER
Rx sent to pharmacy as previously filled and verified by Verbal Order Read Back with provider.    NV 03/21/2025

## 2024-11-07 ENCOUNTER — ANCILLARY PROCEDURE (OUTPATIENT)
Age: 74
End: 2024-11-07
Payer: MEDICARE

## 2024-11-07 DIAGNOSIS — I65.29 CAROTID STENOSIS: ICD-10-CM

## 2024-11-07 LAB
VAS LEFT CCA DIST EDV: 13.9 CM/S
VAS LEFT CCA DIST PSV: 47 CM/S
VAS LEFT CCA PROX EDV: 10.4 CM/S
VAS LEFT CCA PROX PSV: 72.3 CM/S
VAS LEFT ECA EDV: 9.3 CM/S
VAS LEFT ECA PSV: 121.1 CM/S
VAS LEFT ICA DIST EDV: 13.9 CM/S
VAS LEFT ICA DIST PSV: 52.6 CM/S
VAS LEFT ICA MID EDV: 17.9 CM/S
VAS LEFT ICA MID PSV: 49.8 CM/S
VAS LEFT ICA PROX EDV: 13 CM/S
VAS LEFT ICA PROX PSV: 53.5 CM/S
VAS LEFT ICA/CCA PSV: 1.1 NO UNITS
VAS LEFT VERTEBRAL EDV: 10 CM/S
VAS LEFT VERTEBRAL PSV: 31.7 CM/S
VAS RIGHT CCA DIST EDV: 11.2 CM/S
VAS RIGHT CCA DIST PSV: 46.2 CM/S
VAS RIGHT CCA PROX EDV: 7 CM/S
VAS RIGHT CCA PROX PSV: 76.2 CM/S
VAS RIGHT ECA EDV: 6.5 CM/S
VAS RIGHT ECA PSV: 71.7 CM/S
VAS RIGHT ICA DIST EDV: 16.7 CM/S
VAS RIGHT ICA DIST PSV: 56.4 CM/S
VAS RIGHT ICA MID EDV: 14.2 CM/S
VAS RIGHT ICA MID PSV: 47.6 CM/S
VAS RIGHT ICA PROX EDV: 12 CM/S
VAS RIGHT ICA PROX PSV: 63.3 CM/S
VAS RIGHT ICA/CCA PSV: 1.4 NO UNITS
VAS RIGHT VERTEBRAL EDV: 9.5 CM/S
VAS RIGHT VERTEBRAL PSV: 36.5 CM/S

## 2024-11-07 PROCEDURE — 93880 EXTRACRANIAL BILAT STUDY: CPT | Performed by: INTERNAL MEDICINE

## 2024-11-08 ENCOUNTER — TELEPHONE (OUTPATIENT)
Age: 74
End: 2024-11-08

## 2024-11-08 NOTE — TELEPHONE ENCOUNTER
----- Message from Dr. Dayton Ricardo MD sent at 11/8/2024  7:30 AM EST -----  Mild blockages both sides, no change from before. Looks good.will repeat in couple years

## 2024-12-18 RX ORDER — LEVOTHYROXINE SODIUM 100 MCG
TABLET ORAL
Qty: 90 TABLET | Refills: 0 | Status: SHIPPED | OUTPATIENT
Start: 2024-12-18

## 2025-01-20 RX ORDER — VALSARTAN 160 MG/1
160 TABLET ORAL DAILY
Qty: 90 TABLET | Refills: 1 | Status: SHIPPED | OUTPATIENT
Start: 2025-01-20

## 2025-01-20 NOTE — TELEPHONE ENCOUNTER
Rx sent to pharmacy as previously filled and verified by Verbal Order Read Back with provider.    NV 03/10/2025

## 2025-02-24 RX ORDER — SIMVASTATIN 40 MG
40 TABLET ORAL NIGHTLY
Qty: 90 TABLET | Refills: 0 | Status: SHIPPED | OUTPATIENT
Start: 2025-02-24

## 2025-03-07 SDOH — ECONOMIC STABILITY: FOOD INSECURITY: WITHIN THE PAST 12 MONTHS, THE FOOD YOU BOUGHT JUST DIDN'T LAST AND YOU DIDN'T HAVE MONEY TO GET MORE.: NEVER TRUE

## 2025-03-07 SDOH — ECONOMIC STABILITY: INCOME INSECURITY: IN THE LAST 12 MONTHS, WAS THERE A TIME WHEN YOU WERE NOT ABLE TO PAY THE MORTGAGE OR RENT ON TIME?: NO

## 2025-03-07 SDOH — ECONOMIC STABILITY: FOOD INSECURITY: WITHIN THE PAST 12 MONTHS, YOU WORRIED THAT YOUR FOOD WOULD RUN OUT BEFORE YOU GOT MONEY TO BUY MORE.: NEVER TRUE

## 2025-03-07 SDOH — ECONOMIC STABILITY: TRANSPORTATION INSECURITY
IN THE PAST 12 MONTHS, HAS THE LACK OF TRANSPORTATION KEPT YOU FROM MEDICAL APPOINTMENTS OR FROM GETTING MEDICATIONS?: NO

## 2025-03-10 ENCOUNTER — OFFICE VISIT (OUTPATIENT)
Age: 75
End: 2025-03-10
Payer: MEDICARE

## 2025-03-10 VITALS
WEIGHT: 154 LBS | OXYGEN SATURATION: 98 % | TEMPERATURE: 98.2 F | SYSTOLIC BLOOD PRESSURE: 126 MMHG | BODY MASS INDEX: 26.29 KG/M2 | HEART RATE: 57 BPM | HEIGHT: 64 IN | DIASTOLIC BLOOD PRESSURE: 68 MMHG | RESPIRATION RATE: 16 BRPM

## 2025-03-10 DIAGNOSIS — I10 ESSENTIAL (PRIMARY) HYPERTENSION: ICD-10-CM

## 2025-03-10 DIAGNOSIS — E03.9 HYPOTHYROIDISM, UNSPECIFIED TYPE: ICD-10-CM

## 2025-03-10 DIAGNOSIS — Z79.899 OTHER LONG TERM (CURRENT) DRUG THERAPY: ICD-10-CM

## 2025-03-10 DIAGNOSIS — E78.2 MIXED HYPERLIPIDEMIA: ICD-10-CM

## 2025-03-10 DIAGNOSIS — I10 ESSENTIAL (PRIMARY) HYPERTENSION: Primary | ICD-10-CM

## 2025-03-10 DIAGNOSIS — R73.01 IFG (IMPAIRED FASTING GLUCOSE): ICD-10-CM

## 2025-03-10 DIAGNOSIS — F32.A DEPRESSION, UNSPECIFIED DEPRESSION TYPE: ICD-10-CM

## 2025-03-10 LAB
ALBUMIN SERPL-MCNC: 4 G/DL (ref 3.5–5)
ALBUMIN/GLOB SERPL: 1.4 (ref 1.1–2.2)
ALP SERPL-CCNC: 103 U/L (ref 45–117)
ALT SERPL-CCNC: 28 U/L (ref 12–78)
ANION GAP SERPL CALC-SCNC: 4 MMOL/L (ref 2–12)
AST SERPL-CCNC: 23 U/L (ref 15–37)
BASOPHILS # BLD: 0.05 K/UL (ref 0–0.1)
BASOPHILS NFR BLD: 1 % (ref 0–1)
BILIRUB SERPL-MCNC: 0.6 MG/DL (ref 0.2–1)
BUN SERPL-MCNC: 27 MG/DL (ref 6–20)
BUN/CREAT SERPL: 38 (ref 12–20)
CALCIUM SERPL-MCNC: 9.9 MG/DL (ref 8.5–10.1)
CHLORIDE SERPL-SCNC: 104 MMOL/L (ref 97–108)
CO2 SERPL-SCNC: 30 MMOL/L (ref 21–32)
CREAT SERPL-MCNC: 0.71 MG/DL (ref 0.55–1.02)
DIFFERENTIAL METHOD BLD: NORMAL
EOSINOPHIL # BLD: 0.09 K/UL (ref 0–0.4)
EOSINOPHIL NFR BLD: 1.8 % (ref 0–7)
ERYTHROCYTE [DISTWIDTH] IN BLOOD BY AUTOMATED COUNT: 11.9 % (ref 11.5–14.5)
EST. AVERAGE GLUCOSE BLD GHB EST-MCNC: 103 MG/DL
GLOBULIN SER CALC-MCNC: 2.9 G/DL (ref 2–4)
GLUCOSE SERPL-MCNC: 89 MG/DL (ref 65–100)
HBA1C MFR BLD: 5.2 % (ref 4–5.6)
HCT VFR BLD AUTO: 39.8 % (ref 35–47)
HGB BLD-MCNC: 13.7 G/DL (ref 11.5–16)
IMM GRANULOCYTES # BLD AUTO: 0.01 K/UL (ref 0–0.04)
IMM GRANULOCYTES NFR BLD AUTO: 0.2 % (ref 0–0.5)
LYMPHOCYTES # BLD: 1.36 K/UL (ref 0.8–3.5)
LYMPHOCYTES NFR BLD: 26.6 % (ref 12–49)
MCH RBC QN AUTO: 33.5 PG (ref 26–34)
MCHC RBC AUTO-ENTMCNC: 34.4 G/DL (ref 30–36.5)
MCV RBC AUTO: 97.3 FL (ref 80–99)
MONOCYTES # BLD: 0.58 K/UL (ref 0–1)
MONOCYTES NFR BLD: 11.3 % (ref 5–13)
NEUTS SEG # BLD: 3.03 K/UL (ref 1.8–8)
NEUTS SEG NFR BLD: 59.1 % (ref 32–75)
NRBC # BLD: 0 K/UL (ref 0–0.01)
NRBC BLD-RTO: 0 PER 100 WBC
PLATELET # BLD AUTO: 308 K/UL (ref 150–400)
PMV BLD AUTO: 10.6 FL (ref 8.9–12.9)
POTASSIUM SERPL-SCNC: 4.8 MMOL/L (ref 3.5–5.1)
PROT SERPL-MCNC: 6.9 G/DL (ref 6.4–8.2)
RBC # BLD AUTO: 4.09 M/UL (ref 3.8–5.2)
SODIUM SERPL-SCNC: 138 MMOL/L (ref 136–145)
T4 FREE SERPL-MCNC: 1.3 NG/DL (ref 0.8–1.5)
TSH SERPL DL<=0.05 MIU/L-ACNC: 0.53 UIU/ML (ref 0.36–3.74)
WBC # BLD AUTO: 5.1 K/UL (ref 3.6–11)

## 2025-03-10 PROCEDURE — 1036F TOBACCO NON-USER: CPT | Performed by: INTERNAL MEDICINE

## 2025-03-10 PROCEDURE — 99214 OFFICE O/P EST MOD 30 MIN: CPT | Performed by: INTERNAL MEDICINE

## 2025-03-10 PROCEDURE — 1123F ACP DISCUSS/DSCN MKR DOCD: CPT | Performed by: INTERNAL MEDICINE

## 2025-03-10 PROCEDURE — 1090F PRES/ABSN URINE INCON ASSESS: CPT | Performed by: INTERNAL MEDICINE

## 2025-03-10 PROCEDURE — 1126F AMNT PAIN NOTED NONE PRSNT: CPT | Performed by: INTERNAL MEDICINE

## 2025-03-10 PROCEDURE — G8400 PT W/DXA NO RESULTS DOC: HCPCS | Performed by: INTERNAL MEDICINE

## 2025-03-10 PROCEDURE — 3078F DIAST BP <80 MM HG: CPT | Performed by: INTERNAL MEDICINE

## 2025-03-10 PROCEDURE — G8419 CALC BMI OUT NRM PARAM NOF/U: HCPCS | Performed by: INTERNAL MEDICINE

## 2025-03-10 PROCEDURE — 3074F SYST BP LT 130 MM HG: CPT | Performed by: INTERNAL MEDICINE

## 2025-03-10 PROCEDURE — G8427 DOCREV CUR MEDS BY ELIG CLIN: HCPCS | Performed by: INTERNAL MEDICINE

## 2025-03-10 PROCEDURE — 1159F MED LIST DOCD IN RCRD: CPT | Performed by: INTERNAL MEDICINE

## 2025-03-10 ASSESSMENT — PATIENT HEALTH QUESTIONNAIRE - PHQ9
SUM OF ALL RESPONSES TO PHQ QUESTIONS 1-9: 0
4. FEELING TIRED OR HAVING LITTLE ENERGY: NOT AT ALL
3. TROUBLE FALLING OR STAYING ASLEEP: NOT AT ALL
8. MOVING OR SPEAKING SO SLOWLY THAT OTHER PEOPLE COULD HAVE NOTICED. OR THE OPPOSITE, BEING SO FIGETY OR RESTLESS THAT YOU HAVE BEEN MOVING AROUND A LOT MORE THAN USUAL: NOT AT ALL
2. FEELING DOWN, DEPRESSED OR HOPELESS: NOT AT ALL
SUM OF ALL RESPONSES TO PHQ QUESTIONS 1-9: 0
7. TROUBLE CONCENTRATING ON THINGS, SUCH AS READING THE NEWSPAPER OR WATCHING TELEVISION: NOT AT ALL
9. THOUGHTS THAT YOU WOULD BE BETTER OFF DEAD, OR OF HURTING YOURSELF: NOT AT ALL
10. IF YOU CHECKED OFF ANY PROBLEMS, HOW DIFFICULT HAVE THESE PROBLEMS MADE IT FOR YOU TO DO YOUR WORK, TAKE CARE OF THINGS AT HOME, OR GET ALONG WITH OTHER PEOPLE: NOT DIFFICULT AT ALL
1. LITTLE INTEREST OR PLEASURE IN DOING THINGS: NOT AT ALL
6. FEELING BAD ABOUT YOURSELF - OR THAT YOU ARE A FAILURE OR HAVE LET YOURSELF OR YOUR FAMILY DOWN: NOT AT ALL
5. POOR APPETITE OR OVEREATING: NOT AT ALL

## 2025-03-10 NOTE — PROGRESS NOTES
Gabbie Morrison is a 74 y.o. female Established patient who presents today for evaluation of the following -           Assessment & Plan  1. Essential (primary) hypertension  -taking valsartan 160mg daily.  -blood pressure is well controlled.  -no medication adjustments needed    - CBC with Auto Differential; Future  - Comprehensive Metabolic Panel; Future    2. Mixed hyperlipidemia  -compliant with simvastatin 40mg daily  -need to maintain low fat diet  -encouraged regular exercise.   -last fasting lipid panel done 9/4/2024- LDL was 106    3. Hypothyroidism, unspecified type  -appears clinically euthyroid with levothyroxine of 100mcg daily.  -will check thyroid function today    - TSH; Future  - T4, Free; Future    4. Depression, unspecified depression type  -taking fluoxetine 20mg daily  -mood is very well controlled. No adjustments at this time    5. IFG (impaired fasting glucose)  -Maintain diet low in sugar and carbohydrates    - Comprehensive Metabolic Panel; Future  - Hemoglobin A1C; Future    6. Other long term (current) drug therapy                Orders Placed This Encounter    CBC with Auto Differential     Standing Status:   Future     Number of Occurrences:   1     Expected Date:   3/10/2025     Expiration Date:   3/10/2026    Comprehensive Metabolic Panel     Standing Status:   Future     Number of Occurrences:   1     Expected Date:   3/10/2025     Expiration Date:   3/10/2026    TSH     Standing Status:   Future     Number of Occurrences:   1     Expected Date:   3/10/2025     Expiration Date:   3/10/2026    T4, Free     Standing Status:   Future     Number of Occurrences:   1     Expected Date:   3/10/2025     Expiration Date:   3/10/2026    Hemoglobin A1C     Standing Status:   Future     Number of Occurrences:   1     Expected Date:   3/10/2025     Expiration Date:   3/10/2026       Follow-up Disposition:     Return in about 6 months (around 9/10/2025) for Medicare AWV.                   History of

## 2025-03-11 ENCOUNTER — RESULTS FOLLOW-UP (OUTPATIENT)
Age: 75
End: 2025-03-11

## 2025-04-16 RX ORDER — LEVOTHYROXINE SODIUM 100 MCG
TABLET ORAL
Qty: 90 TABLET | Refills: 0 | Status: SHIPPED | OUTPATIENT
Start: 2025-04-16 | End: 2025-04-17 | Stop reason: SDUPTHER

## 2025-04-16 NOTE — TELEPHONE ENCOUNTER
Rx sent to pharmacy as previously filled and verified by Verbal Order Read Back with provider.    NV 09/24/2025

## 2025-04-17 RX ORDER — LEVOTHYROXINE SODIUM 100 MCG
TABLET ORAL
Qty: 90 TABLET | Refills: 1 | Status: SHIPPED | OUTPATIENT
Start: 2025-04-17

## 2025-05-23 RX ORDER — SIMVASTATIN 40 MG
40 TABLET ORAL NIGHTLY
Qty: 90 TABLET | Refills: 1 | Status: SHIPPED | OUTPATIENT
Start: 2025-05-23

## 2025-06-06 NOTE — TELEPHONE ENCOUNTER
Pt last seen on 3/10/2025. Due to return in 6 months.    Has appt on 9/29/2025.    Rx last filled on 12/17/24 #90/1RF.    Rx sent to pharmacy as #90/0RF and verified by Verbal Order Read Back with provider.

## 2025-07-10 RX ORDER — LEVOTHYROXINE SODIUM 100 MCG
TABLET ORAL
Qty: 90 TABLET | Refills: 1 | Status: SHIPPED | OUTPATIENT
Start: 2025-07-10

## 2025-07-10 NOTE — TELEPHONE ENCOUNTER
Last office visit 3/10/25  Next Appt  With Internal Medicine (Raquel Oropeza MD)  09/29/2025 at 9:20 AM    Last fill on synthroid 4/17/25 #90 with 1 additional refill

## 2025-07-18 RX ORDER — VALSARTAN 160 MG/1
160 TABLET ORAL DAILY
Qty: 90 TABLET | Refills: 1 | Status: SHIPPED | OUTPATIENT
Start: 2025-07-18

## (undated) DEVICE — SPECIMEN SOCK - STANDARD: Brand: MEDI-VAC

## (undated) DEVICE — KENDALL SCD EXPRESS SLEEVES, KNEE LENGTH, MEDIUM: Brand: KENDALL SCD

## (undated) DEVICE — TOWEL SURG W17XL27IN STD BLU COT NONFENESTRATED PREWASHED

## (undated) DEVICE — DEVON™ KNEE AND BODY STRAP 60" X 3" (1.5 M X 7.6 CM): Brand: DEVON

## (undated) DEVICE — Z INACTIVE USE 2527070 DRAPE SURG W40XL44IN UNDERBUTTOCK SMS POLYPR W/ PCH BK DISP

## (undated) DEVICE — DEVICE TISS REM IU CANSTR VAC TB FT PEDAL DISPOSABLE MYOSURE

## (undated) DEVICE — LIGHT HANDLE: Brand: DEVON

## (undated) DEVICE — CATH URETH INTMIT ROB 16FR FUN -- CONVERT TO ITEM 179520

## (undated) DEVICE — TRAY PREP DRY W/ PREM GLV 2 APPL 6 SPNG 2 UNDPD 1 OVERWRAP

## (undated) DEVICE — GLOVE SURG SZ 75 L1212IN FNGR THK138MIL BRN LTX FREE

## (undated) DEVICE — PAD SANIT NPKN 4IN GRD

## (undated) DEVICE — SKIN MARKER,REGULAR TIP WITH RULER AND LABELS: Brand: DEVON

## (undated) DEVICE — Device

## (undated) DEVICE — PACK,LITHOTOMY,PK I: Brand: MEDLINE

## (undated) DEVICE — SOLUTION IRRIG 3000ML 0.9% SOD CHL FLX CONT 0797208] ICU MEDICAL INC]

## (undated) DEVICE — SET SEALS HYSTEROSCOPE DISP -- MYOSURE  EA=10

## (undated) DEVICE — BASIC SINGLE BASIN BTC-LF: Brand: MEDLINE INDUSTRIES, INC.

## (undated) DEVICE — INFECTION CONTROL KIT SYS

## (undated) DEVICE — GOWN,SIRUS,NONRNF,SETINSLV,XL,20/CS: Brand: MEDLINE

## (undated) DEVICE — X-RAY SPONGES,16 PLY: Brand: DERMACEA